# Patient Record
Sex: FEMALE | Race: WHITE | Employment: UNEMPLOYED | URBAN - METROPOLITAN AREA
[De-identification: names, ages, dates, MRNs, and addresses within clinical notes are randomized per-mention and may not be internally consistent; named-entity substitution may affect disease eponyms.]

---

## 2018-01-09 LAB — DIABETIC RETINOPATHY: NEGATIVE

## 2018-02-16 PROBLEM — F41.9 ANXIETY DISORDER: Status: ACTIVE | Noted: 2018-02-16

## 2018-02-16 PROBLEM — E78.5 HYPERLIPIDEMIA: Status: ACTIVE | Noted: 2018-02-16

## 2018-02-16 PROBLEM — E11.9 DM (DIABETES MELLITUS) (HCC): Status: ACTIVE | Noted: 2018-02-16

## 2018-02-16 PROBLEM — N17.9 ACUTE KIDNEY FAILURE (HCC): Status: RESOLVED | Noted: 2018-02-16 | Resolved: 2018-02-16

## 2018-02-16 PROBLEM — N18.4 CHRONIC KIDNEY DISEASE (CKD) STAGE G4/A1, SEVERELY DECREASED GLOMERULAR FILTRATION RATE (GFR) BETWEEN 15-29 ML/MIN/1.73 SQUARE METER AND ALBUMINURIA CREATININE RATIO LESS THAN 30 MG/G (HCC): Status: ACTIVE | Noted: 2018-02-16

## 2018-02-16 PROBLEM — N17.9 ACUTE KIDNEY FAILURE (HCC): Status: ACTIVE | Noted: 2018-02-16

## 2018-02-16 PROBLEM — E03.9 HYPOTHYROIDISM: Status: ACTIVE | Noted: 2018-02-16

## 2018-02-16 PROBLEM — I10 HTN (HYPERTENSION): Status: ACTIVE | Noted: 2018-02-16

## 2020-09-10 RX ORDER — PIOGLITAZONEHYDROCHLORIDE 30 MG/1
30 TABLET ORAL DAILY
Qty: 30 TABLET | Refills: 3 | Status: SHIPPED | OUTPATIENT
Start: 2020-09-10 | End: 2021-02-18

## 2020-09-10 RX ORDER — ATORVASTATIN CALCIUM 40 MG/1
40 TABLET, FILM COATED ORAL DAILY
Qty: 30 TABLET | Refills: 3 | Status: SHIPPED | OUTPATIENT
Start: 2020-09-10 | End: 2021-02-18

## 2020-09-10 RX ORDER — LEVOTHYROXINE SODIUM 88 UG/1
88 TABLET ORAL DAILY
Qty: 30 TABLET | Refills: 3 | Status: SHIPPED | OUTPATIENT
Start: 2020-09-10 | End: 2021-02-18

## 2020-09-28 ENCOUNTER — OFFICE VISIT (OUTPATIENT)
Dept: INTERNAL MEDICINE CLINIC | Age: 75
End: 2020-09-28
Payer: MEDICARE

## 2020-09-28 VITALS
HEART RATE: 56 BPM | WEIGHT: 194.8 LBS | BODY MASS INDEX: 30.06 KG/M2 | OXYGEN SATURATION: 96 % | DIASTOLIC BLOOD PRESSURE: 90 MMHG | SYSTOLIC BLOOD PRESSURE: 164 MMHG

## 2020-09-28 PROBLEM — F32.A CHRONIC DEPRESSION: Status: ACTIVE | Noted: 2020-09-28

## 2020-09-28 PROBLEM — E11.65 CONTROLLED TYPE 2 DIABETES MELLITUS WITH HYPERGLYCEMIA, WITHOUT LONG-TERM CURRENT USE OF INSULIN (HCC): Status: ACTIVE | Noted: 2020-09-28

## 2020-09-28 PROBLEM — G89.29 CHRONIC PAIN OF RIGHT KNEE: Status: ACTIVE | Noted: 2020-09-28

## 2020-09-28 PROBLEM — G25.81 RLS (RESTLESS LEGS SYNDROME): Status: ACTIVE | Noted: 2020-09-28

## 2020-09-28 PROBLEM — M25.561 CHRONIC PAIN OF RIGHT KNEE: Status: ACTIVE | Noted: 2020-09-28

## 2020-09-28 PROBLEM — Z72.0 TOBACCO ABUSE: Status: ACTIVE | Noted: 2020-09-28

## 2020-09-28 PROCEDURE — 1123F ACP DISCUSS/DSCN MKR DOCD: CPT | Performed by: INTERNAL MEDICINE

## 2020-09-28 PROCEDURE — 1090F PRES/ABSN URINE INCON ASSESS: CPT | Performed by: INTERNAL MEDICINE

## 2020-09-28 PROCEDURE — 90694 VACC AIIV4 NO PRSRV 0.5ML IM: CPT | Performed by: INTERNAL MEDICINE

## 2020-09-28 PROCEDURE — G8400 PT W/DXA NO RESULTS DOC: HCPCS | Performed by: INTERNAL MEDICINE

## 2020-09-28 PROCEDURE — 4040F PNEUMOC VAC/ADMIN/RCVD: CPT | Performed by: INTERNAL MEDICINE

## 2020-09-28 PROCEDURE — G0008 ADMIN INFLUENZA VIRUS VAC: HCPCS | Performed by: INTERNAL MEDICINE

## 2020-09-28 PROCEDURE — 3017F COLORECTAL CA SCREEN DOC REV: CPT | Performed by: INTERNAL MEDICINE

## 2020-09-28 PROCEDURE — 99213 OFFICE O/P EST LOW 20 MIN: CPT | Performed by: INTERNAL MEDICINE

## 2020-09-28 PROCEDURE — G8417 CALC BMI ABV UP PARAM F/U: HCPCS | Performed by: INTERNAL MEDICINE

## 2020-09-28 PROCEDURE — 4004F PT TOBACCO SCREEN RCVD TLK: CPT | Performed by: INTERNAL MEDICINE

## 2020-09-28 PROCEDURE — 3046F HEMOGLOBIN A1C LEVEL >9.0%: CPT | Performed by: INTERNAL MEDICINE

## 2020-09-28 PROCEDURE — 2022F DILAT RTA XM EVC RTNOPTHY: CPT | Performed by: INTERNAL MEDICINE

## 2020-09-28 PROCEDURE — G8427 DOCREV CUR MEDS BY ELIG CLIN: HCPCS | Performed by: INTERNAL MEDICINE

## 2020-09-28 RX ORDER — AMLODIPINE BESYLATE 10 MG/1
10 TABLET ORAL DAILY
Qty: 30 TABLET | Refills: 1 | Status: SHIPPED | OUTPATIENT
Start: 2020-09-28 | End: 2020-12-07 | Stop reason: SDUPTHER

## 2020-09-28 RX ORDER — CHLORTHALIDONE 25 MG/1
TABLET ORAL
Qty: 30 TABLET | Refills: 5 | Status: SHIPPED | OUTPATIENT
Start: 2020-09-28 | End: 2020-12-07 | Stop reason: SDUPTHER

## 2020-09-28 RX ORDER — CLONIDINE HYDROCHLORIDE 0.2 MG/1
0.2 TABLET ORAL 2 TIMES DAILY
Qty: 60 TABLET | Refills: 1 | Status: SHIPPED | OUTPATIENT
Start: 2020-09-28 | End: 2020-12-07 | Stop reason: SDUPTHER

## 2020-09-28 RX ORDER — BUSPIRONE HYDROCHLORIDE 15 MG/1
15 TABLET ORAL 3 TIMES DAILY
Qty: 90 TABLET | Refills: 0 | Status: ON HOLD | OUTPATIENT
Start: 2020-09-28 | End: 2022-06-10 | Stop reason: HOSPADM

## 2020-09-28 ASSESSMENT — PATIENT HEALTH QUESTIONNAIRE - PHQ9
2. FEELING DOWN, DEPRESSED OR HOPELESS: 0
1. LITTLE INTEREST OR PLEASURE IN DOING THINGS: 0
SUM OF ALL RESPONSES TO PHQ9 QUESTIONS 1 & 2: 0
SUM OF ALL RESPONSES TO PHQ QUESTIONS 1-9: 0
SUM OF ALL RESPONSES TO PHQ QUESTIONS 1-9: 0

## 2020-09-28 NOTE — PROGRESS NOTES
LUNGS:    Clear to auscultation bilaterally, respirations unlabored, accessory muscles are not used. HEART:     Regular rate and rhythm, S1 and S2 normal, no murmur, rub or gallop. PMI in MCL. ABDOMEN:    Soft, non-tender, bowel sounds are normoactive, no masses, no hepatospleenomegaly. EXTREMITY:   no bipedal edema  NEURO:  Alert, oriented to person, place and time. Grossly intact. Musculoskeletal:         Mild tenderness right knee. PSYCH:  Mood euthymic, insight and judgement good. ASSESSMENT/PLAN:      Essential hypertension. Continue current medications, denies side effect with medicationss. Low salt diet and exercise advised. -     cloNIDine (CATAPRES) 0.2 MG tablet; Take 1 tablet by mouth 2 times daily Take 2 tabs every day; 1 tab every day  -     chlorthalidone (HYGROTON) 25 MG tablet; TAKE ONE TABLET BY MOUTH DAILY  -     amLODIPine (NORVASC) 10 MG tablet; Take 1 tablet by mouth daily    Mixed hyperlipidemia. Patient is taking cholesterol medications regularly. Denies any side effects. Diet and exercise advised. On lipitor. Hypothyroidism, unspecified type. Continue synthroid. Anxiety disorder, unspecified type  -     busPIRone (BUSPAR) 15 MG tablet; Take 15 mg by mouth 3 times daily  Advise to follow with her psychiatrist.    Chronic kidney disease (CKD) stage G4/A1, severely decreased glomerular filtration rate (GFR) between 15-29 mL/min/1.73 square meter and albuminuria creatinine ratio less than 30 mg/g ([address] Avoid NSAID, keep hydrated, keep appointments with nephrologist.    Tobacco abuse. Advised strongly to quit smoking. Chronic depression. Sees psychiatrist.    RLS (restless legs syndrome)    Chronic pain of right knee    Controlled type 2 diabetes mellitus with hyperglycemia, without long-term current use of insulin ([address] Continue diabetic meds . Diet and exercise.     Need for immunization against influenza  -     INFLUENZA, QUADV, ADJUVANTED, 65 YRS =, IM, PF, PREFILL SYR, 0.5ML (FLUAD)            Care discussed with patient. Questions answered and patient verbalizes understanding and agrees with plan. Medications reviewed and reconciled. Continue current medications. Appropriate prescriptions are ordered. Risks and benefits of meds are discussed. After visit summary provided. Advised to call for any problems, questions, or concerns. If symptoms worsen or don't improve as expected, to call us or go to ER. Follow up as directed, sooner if needed. No follow-ups on file.         Osiris Hill MD

## 2020-12-07 DIAGNOSIS — I10 ESSENTIAL HYPERTENSION: ICD-10-CM

## 2020-12-07 RX ORDER — GLIMEPIRIDE 1 MG/1
4 TABLET ORAL 2 TIMES DAILY
Qty: 30 TABLET | Refills: 3 | Status: SHIPPED | OUTPATIENT
Start: 2020-12-07 | End: 2020-12-29 | Stop reason: SDUPTHER

## 2020-12-07 RX ORDER — AMLODIPINE BESYLATE 10 MG/1
10 TABLET ORAL DAILY
Qty: 30 TABLET | Refills: 3 | Status: SHIPPED | OUTPATIENT
Start: 2020-12-07 | End: 2021-03-24 | Stop reason: SDUPTHER

## 2020-12-07 RX ORDER — CHLORTHALIDONE 25 MG/1
TABLET ORAL
Qty: 30 TABLET | Refills: 3 | Status: SHIPPED | OUTPATIENT
Start: 2020-12-07 | End: 2021-03-24 | Stop reason: SDUPTHER

## 2020-12-07 RX ORDER — METOPROLOL TARTRATE 50 MG/1
50 TABLET, FILM COATED ORAL 2 TIMES DAILY
Qty: 60 TABLET | Refills: 3 | Status: SHIPPED | OUTPATIENT
Start: 2020-12-07 | End: 2021-03-24 | Stop reason: SDUPTHER

## 2020-12-07 RX ORDER — CLONIDINE HYDROCHLORIDE 0.2 MG/1
0.2 TABLET ORAL DAILY
Qty: 30 TABLET | Refills: 3 | Status: SHIPPED | OUTPATIENT
Start: 2020-12-07 | End: 2021-03-24 | Stop reason: SDUPTHER

## 2020-12-29 ENCOUNTER — OFFICE VISIT (OUTPATIENT)
Dept: INTERNAL MEDICINE CLINIC | Age: 75
End: 2020-12-29
Payer: MEDICARE

## 2020-12-29 VITALS
WEIGHT: 198 LBS | SYSTOLIC BLOOD PRESSURE: 140 MMHG | TEMPERATURE: 97.4 F | HEART RATE: 63 BPM | HEIGHT: 65 IN | DIASTOLIC BLOOD PRESSURE: 86 MMHG | BODY MASS INDEX: 32.99 KG/M2 | OXYGEN SATURATION: 95 %

## 2020-12-29 PROBLEM — M17.0 ARTHRITIS OF BOTH KNEES: Status: ACTIVE | Noted: 2020-12-29

## 2020-12-29 LAB
BASOPHILS ABSOLUTE: 0.1 K/UL (ref 0–0.2)
BASOPHILS RELATIVE PERCENT: 0.7 %
CHP ED QC CHECK: NORMAL
EOSINOPHILS ABSOLUTE: 0.1 K/UL (ref 0–0.6)
EOSINOPHILS RELATIVE PERCENT: 2 %
GLUCOSE BLD-MCNC: 162 MG/DL
HCT VFR BLD CALC: 50.3 % (ref 36–48)
HEMOGLOBIN: 16.9 G/DL (ref 12–16)
LYMPHOCYTES ABSOLUTE: 1.5 K/UL (ref 1–5.1)
LYMPHOCYTES RELATIVE PERCENT: 19.9 %
MCH RBC QN AUTO: 32.5 PG (ref 26–34)
MCHC RBC AUTO-ENTMCNC: 33.6 G/DL (ref 31–36)
MCV RBC AUTO: 96.9 FL (ref 80–100)
MONOCYTES ABSOLUTE: 0.5 K/UL (ref 0–1.3)
MONOCYTES RELATIVE PERCENT: 6.5 %
NEUTROPHILS ABSOLUTE: 5.3 K/UL (ref 1.7–7.7)
NEUTROPHILS RELATIVE PERCENT: 70.9 %
PDW BLD-RTO: 13.9 % (ref 12.4–15.4)
PLATELET # BLD: 205 K/UL (ref 135–450)
PMV BLD AUTO: 9.1 FL (ref 5–10.5)
RBC # BLD: 5.19 M/UL (ref 4–5.2)
TSH SERPL DL<=0.05 MIU/L-ACNC: 2.6 UIU/ML (ref 0.27–4.2)
WBC # BLD: 7.5 K/UL (ref 4–11)

## 2020-12-29 PROCEDURE — 3017F COLORECTAL CA SCREEN DOC REV: CPT | Performed by: INTERNAL MEDICINE

## 2020-12-29 PROCEDURE — G8484 FLU IMMUNIZE NO ADMIN: HCPCS | Performed by: INTERNAL MEDICINE

## 2020-12-29 PROCEDURE — 1090F PRES/ABSN URINE INCON ASSESS: CPT | Performed by: INTERNAL MEDICINE

## 2020-12-29 PROCEDURE — 99214 OFFICE O/P EST MOD 30 MIN: CPT | Performed by: INTERNAL MEDICINE

## 2020-12-29 PROCEDURE — 36415 COLL VENOUS BLD VENIPUNCTURE: CPT | Performed by: INTERNAL MEDICINE

## 2020-12-29 PROCEDURE — 1123F ACP DISCUSS/DSCN MKR DOCD: CPT | Performed by: INTERNAL MEDICINE

## 2020-12-29 PROCEDURE — G8400 PT W/DXA NO RESULTS DOC: HCPCS | Performed by: INTERNAL MEDICINE

## 2020-12-29 PROCEDURE — G8427 DOCREV CUR MEDS BY ELIG CLIN: HCPCS | Performed by: INTERNAL MEDICINE

## 2020-12-29 PROCEDURE — 3046F HEMOGLOBIN A1C LEVEL >9.0%: CPT | Performed by: INTERNAL MEDICINE

## 2020-12-29 PROCEDURE — 82962 GLUCOSE BLOOD TEST: CPT | Performed by: INTERNAL MEDICINE

## 2020-12-29 PROCEDURE — G8417 CALC BMI ABV UP PARAM F/U: HCPCS | Performed by: INTERNAL MEDICINE

## 2020-12-29 PROCEDURE — 4040F PNEUMOC VAC/ADMIN/RCVD: CPT | Performed by: INTERNAL MEDICINE

## 2020-12-29 PROCEDURE — 4004F PT TOBACCO SCREEN RCVD TLK: CPT | Performed by: INTERNAL MEDICINE

## 2020-12-29 PROCEDURE — 2022F DILAT RTA XM EVC RTNOPTHY: CPT | Performed by: INTERNAL MEDICINE

## 2020-12-29 RX ORDER — GLIMEPIRIDE 4 MG/1
4 TABLET ORAL 2 TIMES DAILY
Qty: 60 TABLET | Refills: 3 | Status: SHIPPED | OUTPATIENT
Start: 2020-12-29 | End: 2021-03-24 | Stop reason: SDUPTHER

## 2020-12-29 RX ORDER — LISINOPRIL 20 MG/1
20 TABLET ORAL 2 TIMES DAILY
Qty: 60 TABLET | Refills: 3 | Status: SHIPPED | OUTPATIENT
Start: 2020-12-29 | End: 2021-03-24 | Stop reason: SDUPTHER

## 2020-12-29 NOTE — PROGRESS NOTES
Name: Abbey Goodpasture  [id number]  Age: 76 y.o. YOB: 1945  Sex: female    CHIEF COMPLAINT:    Chief Complaint   Patient presents with    Hypertension    Diabetes       HISTORY OF PRESENT ILLNESS:     This is a pleasant  76 y.o. female  is seen today for management of chronic medical problems and medications refills. Previous records reviewed . Doing OK . Denies CP or SOB. No fever , sore throat or cough or congestion. Denies any abdominal pain. Appetite OK. Bowels moving [id number] No urinary symptoms. C/O pain legs/knees. Tylenol does not help her. Hearing is ok. Vision Ok with glasses. Denies  any significant skin lesions. Depression and anxiety better with medications. Being followed by psychiatrist, Dr. Guille Babin periodically. She does not check her sugars. No other complaints. Past Medical History:    Patient Active Problem List   Diagnosis    Chronic kidney disease (CKD) stage G4/A1, severely decreased glomerular filtration rate (GFR) between 15-29 mL/min/1.73 square meter and albuminuria creatinine ratio less than 30 mg/g (HCC)    DM [facility]    HTN (hypertension)    Hyperlipidemia    Hypothyroidism    Anxiety disorder    Tobacco abuse    Chronic depression    RLS (restless legs syndrome)    Controlled type 2 diabetes mellitus with hyperglycemia, without long-term current use of insulin (HCC)    Chronic pain of right knee    Arthritis of both knees        Past Surgical History:    No past surgical history on file.     Social History:   Social History     Tobacco Use    Smoking status: Current Every Day Smoker     Packs/day: 1.00     Years: 50.00     Pack years: 50.00     Types: Cigarettes    Smokeless tobacco: Never Used   Substance Use Topics    Alcohol use: No       Family History:       Problem Relation Age of Onset    Diabetes Father     High Blood Pressure Father     Kidney Disease Father     Cancer Brother  Stroke Maternal Grandmother     Cancer Paternal Grandmother        Allergies:  Seasonal    Current Medications :      Prior to Admission medications    Medication Sig Start Date End Date Taking? Authorizing Provider   glimepiride (AMARYL) 4 MG tablet Take 1 tablet by mouth 2 times daily Take 1.5 tabs 12/29/20  Yes Laura Block MD   lisinopril (PRINIVIL;ZESTRIL) 20 MG tablet Take 1 tablet by mouth 2 times daily 12/29/20  Yes Laura Block MD   chlorthalidone (HYGROTON) 25 MG tablet TAKE ONE TABLET BY MOUTH DAILY 12/7/20  Yes Laura Block MD   metoprolol tartrate (LOPRESSOR) 50 MG tablet Take 1 tablet by mouth 2 times daily Take 1.5tab bid 12/7/20  Yes Laura Block MD   cloNIDine (CATAPRES) 0.2 MG tablet Take 1 tablet by mouth daily 12/7/20  Yes Laura Block MD   amLODIPine (NORVASC) 10 MG tablet Take 1 tablet by mouth daily 12/7/20  Yes Laura Block MD   busPIRone (BUSPAR) 15 MG tablet Take 15 mg by mouth 3 times daily 9/28/20  Yes Laura Block MD   levothyroxine (SYNTHROID) 88 MCG tablet Take 1 tablet by mouth Daily 9/10/20  Yes Laura Block MD   atorvastatin (LIPITOR) 40 MG tablet Take 1 tablet by mouth daily 9/10/20  Yes Laura Block MD   pioglitazone (ACTOS) 30 MG tablet Take 1 tablet by mouth daily 9/10/20  Yes Laura Block MD   spironolactone (ALDACTONE) 25 MG tablet TAKE ONE TABLET BY MOUTH DAILY 1/18/19  Yes Alexandra Garcia MD   LORazepam (ATIVAN) 1 MG tablet Take 1 mg by mouth every 8 hours as needed for Anxiety. Yes Historical Provider, MD   sertraline (ZOLOFT) 25 MG tablet Take 100 mg by mouth daily   Yes Historical Provider, MD       LAB DATA: Reviewed. REVIEW OF SYSTEMS:   see HPI/ Comprehensive review of systems negative except for the ones mentioned in HPI.     PHYSICAL EXAMINATION:   BP (!) 140/86   Pulse 63   Temp 97.4 °F (36.3 °C)   Ht 5' 5\" (1.651 m)   Wt 198 lb (89.8 kg)   SpO2 95%   BMI 32.95 kg/m² GENERAL APPEARANCE:    Alert, oriented x 3, well developed, cooperative, not in any distress, appears stated age. HEAD:   Normocephalic, atraumatic   EYES:   PERRLA, EOMI, lids normal, conjuctivea clear, sclera anicteric. NECK:    Supple, symmetrical,  trachea midline, no thyromegaly, no JVD, no lymphadenopathy. LUNGS:    Clear to auscultation bilaterally, respirations unlabored, accessory muscles are not used. HEART:     Regular rate and rhythm, S1 and S2 normal, no murmur, rub or gallop. PMI in MCL. ABDOMEN:    Soft, non-tender, bowel sounds are normoactive, no masses, no hepatospleenomegaly. EXTREMITY:   no bipedal edema  NEURO:  Alert, oriented to person, place and time. Grossly intact. Musculoskeletal:         No kyphosis or scoliosis, significant tenderness in both of her knees. Skin:                            Warm and dry. No rash or obvious suspicious lesions. PSYCH:  Mood euthymic, insight and judgement good. ASSESSMENT/PLAN:    Type 2 diabetes mellitus with stage 4 chronic kidney disease, without long-term current use of insulin ([address] Doing OK . Denies CP or SOB. No fever , sore throat or cough or congestion. Denies any abdominal pain. Appetite OK. Bowels moving [place]. No urinary symptoms. Denies any significant arthritis. Hearing is ok. Vision Ok with glasses. Denies  any significant skin lesions. Denies any significant depression or anxiety. No other complaints. Refuses to check her sugars at home. -     POCT Glucose  -     glimepiride (AMARYL) 4 MG tablet; Take 1 tablet by mouth 2 times daily Take 1.5 tabs  She is also on Actos. -     Hemoglobin A1C. Advised extensively to check sugars at home but patient refused. Chronic kidney disease (CKD) stage G4/A1, severely decreased glomerular filtration rate (GFR) between 15-29 mL/min/1.73 square meter and albuminuria creatinine ratio less than 30 mg/g ([address] She has not seen her nephrologist for over a year. Advised to follow with her nephrologist.  Avoid NSAID and keep well-hydrated. Essential hypertension. Continue current medications, denies side effect with medicationss. Low salt diet and exercise advised. Also on Norvasc, Catapres and Lopressor and chlorthalidone and Aldactone. -     lisinopril (PRINIVIL;ZESTRIL) 20 MG tablet; Take 1 tablet by mouth 2 times daily  -     CBC Auto Differential  -     Comprehensive Metabolic Panel    Mixed hyperlipidemia. Patient is taking cholesterol medications regularly. Denies any side effects. Diet and exercise advised. Continue Lipitor  -     Lipid, Fasting    Tobacco abuse. Extensively advised her to quit smoking. Patient not ready to quit smoking. She will try to slow down his smoking. RLS (restless legs syndrome). Tylenol as needed for now. Anxiety disorder, unspecified type. Advised to continue to follow with her psychiatrist and take Ativan as prescribed    Chronic depression. Advised to continue to follow with her psychiatrist and take Zoloft as prescribed    Hypothyroidism, unspecified type. Continue Synthroid  -     TSH without Reflex    Arthritis of both knees. Tylenol as needed. Refer to orthopedic doctor. -     Vic Barrera DO, Orthopedic Surgery, [place]      I have recommended that the patient follow CDC guidelines for prevention of COVID-19 infection. I also discussed Coronavirus precaution including wearing face mask, handwashing practice, wiping items touched in public such as gas pumps, door handles, shopping carts, etc. Also Self monitoring for infection - fever, chills, cough, SOB. Should he/she develop symptoms he/she should call office or go to ER for further instructions. Care discussed with patient. Questions answered and patient verbalizes understanding and agrees with plan. Medications reviewed and reconciled. Continue current medications. Appropriate prescriptions are ordered. Risks and benefits of meds are discussed. After visit summary provided. Advised to call for any problems, questions, or concerns. If symptoms worsen or don't improve as expected, to call us or go to ER. Follow up as directed, sooner if needed. Return in about 3 months (around 3/29/2021). This dictation was performed with a verbal recognition program and it was checked for errors. It is possible that there are still dictated errors within this office note. Any errors should be brought immediately to my attention for correction. All efforts were made to ensure that this office note is accurate.      Dex Long MD

## 2020-12-30 LAB
A/G RATIO: 1.6 (ref 1.1–2.2)
ALBUMIN SERPL-MCNC: 4.1 G/DL (ref 3.4–5)
ALP BLD-CCNC: 98 U/L (ref 40–129)
ALT SERPL-CCNC: 12 U/L (ref 10–40)
ANION GAP SERPL CALCULATED.3IONS-SCNC: 17 MMOL/L (ref 3–16)
AST SERPL-CCNC: 14 U/L (ref 15–37)
BILIRUB SERPL-MCNC: 0.3 MG/DL (ref 0–1)
BUN BLDV-MCNC: 32 MG/DL (ref 7–20)
CALCIUM SERPL-MCNC: 9.5 MG/DL (ref 8.3–10.6)
CHLORIDE BLD-SCNC: 99 MMOL/L (ref 99–110)
CHOLESTEROL, FASTING: 141 MG/DL (ref 0–199)
CO2: 27 MMOL/L (ref 21–32)
CREAT SERPL-MCNC: 1.5 MG/DL (ref 0.6–1.2)
ESTIMATED AVERAGE GLUCOSE: 168.6 MG/DL
GFR AFRICAN AMERICAN: 41
GFR NON-AFRICAN AMERICAN: 34
GLOBULIN: 2.5 G/DL
GLUCOSE BLD-MCNC: 158 MG/DL (ref 70–99)
HBA1C MFR BLD: 7.5 %
HDLC SERPL-MCNC: 42 MG/DL (ref 40–60)
LDL CHOLESTEROL CALCULATED: 75 MG/DL
POTASSIUM SERPL-SCNC: 3.9 MMOL/L (ref 3.5–5.1)
SODIUM BLD-SCNC: 143 MMOL/L (ref 136–145)
TOTAL PROTEIN: 6.6 G/DL (ref 6.4–8.2)
TRIGLYCERIDE, FASTING: 118 MG/DL (ref 0–150)
VLDLC SERPL CALC-MCNC: 24 MG/DL

## 2021-02-18 RX ORDER — LEVOTHYROXINE SODIUM 88 UG/1
TABLET ORAL
Qty: 30 TABLET | Refills: 2 | Status: SHIPPED | OUTPATIENT
Start: 2021-02-18 | End: 2021-03-24 | Stop reason: SDUPTHER

## 2021-02-18 RX ORDER — ATORVASTATIN CALCIUM 40 MG/1
TABLET, FILM COATED ORAL
Qty: 30 TABLET | Refills: 2 | Status: SHIPPED | OUTPATIENT
Start: 2021-02-18 | End: 2021-03-24 | Stop reason: SDUPTHER

## 2021-02-18 RX ORDER — PIOGLITAZONEHYDROCHLORIDE 30 MG/1
TABLET ORAL
Qty: 30 TABLET | Refills: 2 | Status: SHIPPED | OUTPATIENT
Start: 2021-02-18 | End: 2021-03-24 | Stop reason: SDUPTHER

## 2021-03-24 ENCOUNTER — OFFICE VISIT (OUTPATIENT)
Dept: INTERNAL MEDICINE CLINIC | Age: 76
End: 2021-03-24
Payer: MEDICARE

## 2021-03-24 VITALS
OXYGEN SATURATION: 97 % | BODY MASS INDEX: 32.49 KG/M2 | WEIGHT: 195 LBS | DIASTOLIC BLOOD PRESSURE: 88 MMHG | HEART RATE: 66 BPM | SYSTOLIC BLOOD PRESSURE: 130 MMHG | HEIGHT: 65 IN | TEMPERATURE: 97.1 F

## 2021-03-24 DIAGNOSIS — G25.81 RLS (RESTLESS LEGS SYNDROME): ICD-10-CM

## 2021-03-24 DIAGNOSIS — N18.32 STAGE 3B CHRONIC KIDNEY DISEASE (HCC): ICD-10-CM

## 2021-03-24 DIAGNOSIS — Z11.59 NEED FOR HEPATITIS C SCREENING TEST: ICD-10-CM

## 2021-03-24 DIAGNOSIS — F32.A CHRONIC DEPRESSION: ICD-10-CM

## 2021-03-24 DIAGNOSIS — E03.4 HYPOTHYROIDISM DUE TO ACQUIRED ATROPHY OF THYROID: ICD-10-CM

## 2021-03-24 DIAGNOSIS — E78.2 MIXED HYPERLIPIDEMIA: ICD-10-CM

## 2021-03-24 DIAGNOSIS — F41.9 ANXIETY: ICD-10-CM

## 2021-03-24 DIAGNOSIS — E11.22 TYPE 2 DIABETES MELLITUS WITH STAGE 4 CHRONIC KIDNEY DISEASE, WITHOUT LONG-TERM CURRENT USE OF INSULIN (HCC): Primary | ICD-10-CM

## 2021-03-24 DIAGNOSIS — I10 ESSENTIAL HYPERTENSION: ICD-10-CM

## 2021-03-24 DIAGNOSIS — M17.0 ARTHRITIS OF BOTH KNEES: ICD-10-CM

## 2021-03-24 DIAGNOSIS — N18.4 TYPE 2 DIABETES MELLITUS WITH STAGE 4 CHRONIC KIDNEY DISEASE, WITHOUT LONG-TERM CURRENT USE OF INSULIN (HCC): Primary | ICD-10-CM

## 2021-03-24 DIAGNOSIS — Z72.0 TOBACCO ABUSE: ICD-10-CM

## 2021-03-24 LAB
A/G RATIO: 1.5 (ref 1.1–2.2)
ALBUMIN SERPL-MCNC: 3.7 G/DL (ref 3.4–5)
ALP BLD-CCNC: 88 U/L (ref 40–129)
ALT SERPL-CCNC: 9 U/L (ref 10–40)
ANION GAP SERPL CALCULATED.3IONS-SCNC: 12 MMOL/L (ref 3–16)
AST SERPL-CCNC: 13 U/L (ref 15–37)
BASOPHILS ABSOLUTE: 0 K/UL (ref 0–0.2)
BASOPHILS RELATIVE PERCENT: 0.8 %
BILIRUB SERPL-MCNC: 0.4 MG/DL (ref 0–1)
BUN BLDV-MCNC: 32 MG/DL (ref 7–20)
CALCIUM SERPL-MCNC: 9.9 MG/DL (ref 8.3–10.6)
CHLORIDE BLD-SCNC: 100 MMOL/L (ref 99–110)
CHP ED QC CHECK: NORMAL
CO2: 31 MMOL/L (ref 21–32)
CREAT SERPL-MCNC: 1.8 MG/DL (ref 0.6–1.2)
EOSINOPHILS ABSOLUTE: 0.1 K/UL (ref 0–0.6)
EOSINOPHILS RELATIVE PERCENT: 1.9 %
GFR AFRICAN AMERICAN: 33
GFR NON-AFRICAN AMERICAN: 27
GLOBULIN: 2.4 G/DL
GLUCOSE BLD-MCNC: 126 MG/DL
GLUCOSE BLD-MCNC: 153 MG/DL (ref 70–99)
HCT VFR BLD CALC: 48.8 % (ref 36–48)
HEMOGLOBIN: 16.6 G/DL (ref 12–16)
LYMPHOCYTES ABSOLUTE: 1.1 K/UL (ref 1–5.1)
LYMPHOCYTES RELATIVE PERCENT: 20.2 %
MCH RBC QN AUTO: 32.2 PG (ref 26–34)
MCHC RBC AUTO-ENTMCNC: 34.1 G/DL (ref 31–36)
MCV RBC AUTO: 94.5 FL (ref 80–100)
MONOCYTES ABSOLUTE: 0.3 K/UL (ref 0–1.3)
MONOCYTES RELATIVE PERCENT: 5.9 %
NEUTROPHILS ABSOLUTE: 3.8 K/UL (ref 1.7–7.7)
NEUTROPHILS RELATIVE PERCENT: 71.2 %
PDW BLD-RTO: 15.2 % (ref 12.4–15.4)
PLATELET # BLD: 149 K/UL (ref 135–450)
PMV BLD AUTO: 9.4 FL (ref 5–10.5)
POTASSIUM SERPL-SCNC: 3.4 MMOL/L (ref 3.5–5.1)
RBC # BLD: 5.17 M/UL (ref 4–5.2)
SODIUM BLD-SCNC: 143 MMOL/L (ref 136–145)
TOTAL PROTEIN: 6.1 G/DL (ref 6.4–8.2)
WBC # BLD: 5.4 K/UL (ref 4–11)

## 2021-03-24 PROCEDURE — 1090F PRES/ABSN URINE INCON ASSESS: CPT | Performed by: INTERNAL MEDICINE

## 2021-03-24 PROCEDURE — 3046F HEMOGLOBIN A1C LEVEL >9.0%: CPT | Performed by: INTERNAL MEDICINE

## 2021-03-24 PROCEDURE — G8400 PT W/DXA NO RESULTS DOC: HCPCS | Performed by: INTERNAL MEDICINE

## 2021-03-24 PROCEDURE — G8427 DOCREV CUR MEDS BY ELIG CLIN: HCPCS | Performed by: INTERNAL MEDICINE

## 2021-03-24 PROCEDURE — 4040F PNEUMOC VAC/ADMIN/RCVD: CPT | Performed by: INTERNAL MEDICINE

## 2021-03-24 PROCEDURE — 3017F COLORECTAL CA SCREEN DOC REV: CPT | Performed by: INTERNAL MEDICINE

## 2021-03-24 PROCEDURE — G8417 CALC BMI ABV UP PARAM F/U: HCPCS | Performed by: INTERNAL MEDICINE

## 2021-03-24 PROCEDURE — 4004F PT TOBACCO SCREEN RCVD TLK: CPT | Performed by: INTERNAL MEDICINE

## 2021-03-24 PROCEDURE — 36415 COLL VENOUS BLD VENIPUNCTURE: CPT | Performed by: INTERNAL MEDICINE

## 2021-03-24 PROCEDURE — 99214 OFFICE O/P EST MOD 30 MIN: CPT | Performed by: INTERNAL MEDICINE

## 2021-03-24 PROCEDURE — 2022F DILAT RTA XM EVC RTNOPTHY: CPT | Performed by: INTERNAL MEDICINE

## 2021-03-24 PROCEDURE — G8484 FLU IMMUNIZE NO ADMIN: HCPCS | Performed by: INTERNAL MEDICINE

## 2021-03-24 PROCEDURE — 1123F ACP DISCUSS/DSCN MKR DOCD: CPT | Performed by: INTERNAL MEDICINE

## 2021-03-24 PROCEDURE — 82962 GLUCOSE BLOOD TEST: CPT | Performed by: INTERNAL MEDICINE

## 2021-03-24 RX ORDER — GLIMEPIRIDE 4 MG/1
4 TABLET ORAL 2 TIMES DAILY
Qty: 60 TABLET | Refills: 3 | Status: SHIPPED | OUTPATIENT
Start: 2021-03-24 | End: 2021-06-24 | Stop reason: SDUPTHER

## 2021-03-24 RX ORDER — PIOGLITAZONEHYDROCHLORIDE 30 MG/1
30 TABLET ORAL DAILY
Qty: 30 TABLET | Refills: 3 | Status: SHIPPED | OUTPATIENT
Start: 2021-03-24 | End: 2021-06-24 | Stop reason: SDUPTHER

## 2021-03-24 RX ORDER — CHLORTHALIDONE 25 MG/1
TABLET ORAL
Qty: 30 TABLET | Refills: 3 | Status: SHIPPED | OUTPATIENT
Start: 2021-03-24 | End: 2021-06-24 | Stop reason: SDUPTHER

## 2021-03-24 RX ORDER — CLONIDINE HYDROCHLORIDE 0.2 MG/1
0.2 TABLET ORAL DAILY
Qty: 30 TABLET | Refills: 3 | Status: SHIPPED | OUTPATIENT
Start: 2021-03-24 | End: 2021-06-24 | Stop reason: SDUPTHER

## 2021-03-24 RX ORDER — AMLODIPINE BESYLATE 10 MG/1
10 TABLET ORAL DAILY
Qty: 30 TABLET | Refills: 3 | Status: SHIPPED | OUTPATIENT
Start: 2021-03-24 | End: 2021-06-24 | Stop reason: SDUPTHER

## 2021-03-24 RX ORDER — LEVOTHYROXINE SODIUM 88 UG/1
88 TABLET ORAL DAILY
Qty: 30 TABLET | Refills: 3 | Status: SHIPPED | OUTPATIENT
Start: 2021-03-24 | End: 2021-06-24 | Stop reason: SDUPTHER

## 2021-03-24 RX ORDER — METOPROLOL TARTRATE 50 MG/1
50 TABLET, FILM COATED ORAL 2 TIMES DAILY
Qty: 60 TABLET | Refills: 3 | Status: SHIPPED | OUTPATIENT
Start: 2021-03-24 | End: 2021-06-24 | Stop reason: SDUPTHER

## 2021-03-24 RX ORDER — ATORVASTATIN CALCIUM 40 MG/1
40 TABLET, FILM COATED ORAL DAILY
Qty: 30 TABLET | Refills: 3 | Status: SHIPPED | OUTPATIENT
Start: 2021-03-24 | End: 2021-06-24 | Stop reason: SDUPTHER

## 2021-03-24 RX ORDER — LISINOPRIL 20 MG/1
20 TABLET ORAL 2 TIMES DAILY
Qty: 60 TABLET | Refills: 3 | Status: SHIPPED | OUTPATIENT
Start: 2021-03-24 | End: 2021-06-24 | Stop reason: SDUPTHER

## 2021-03-24 NOTE — PROGRESS NOTES
Name: Lalita Alejandre  [id number]  Age: 76 y.o. YOB: 1945  Sex: female    CHIEF COMPLAINT:    Chief Complaint   Patient presents with    Diabetes    Hypertension    Other     other chronic conditions       HISTORY OF PRESENT ILLNESS:     This is a pleasant  76 y.o. female  is seen today for management of chronic medical problems and medications refills. Previous records reviewed . Doing OK . Denies CP or SOB. No fever , sore throat or cough or congestion. Still smokes @ 1 1/2 PPD. Not willing to quit at this time. Denies any abdominal pain. Appetite OK. Bowels moving [place]. No urinary symptoms. Still with pain in her knees but better. Did not go to see Dr. Bebeto Crocker as she was worried about contacting Covid etc.  Hearing is ok. Vision Ok with glasses. Denies  any significant skin lesions. Depression and anxiety better with medications. Seeing Dr. Costa periodically. Does not check her sugars at home. No other complaints. Did not get a Covid vaccine yet. Will get it soon. Past Medical History:    Patient Active Problem List   Diagnosis    Stage 4 [facility]    DM (diabetes mellitus) ([facility]    HTN (hypertension)    Hyperlipidemia    Hypothyroidism    Anxiety    Tobacco abuse    Chronic depression    RLS (restless legs syndrome)    Controlled type 2 diabetes mellitus with hyperglycemia, without long-term current use of insulin (HCC)    Chronic pain of right knee    Arthritis of both knees        Past Surgical History:    History reviewed. No pertinent surgical history.     Social History:   Social History     Tobacco Use    Smoking status: Current Every Day Smoker     Packs/day: 1.00     Years: 50.00     Pack years: 50.00     Types: Cigarettes    Smokeless tobacco: Never Used   Substance Use Topics    Alcohol use: No       Family History:       Problem Relation Age of Onset    Diabetes Father     High Blood Pressure Father     Kidney Disease Father  Cancer Brother     Stroke Maternal Grandmother     Cancer Paternal Grandmother        Allergies:  Seasonal    Current Medications :      Prior to Admission medications    Medication Sig Start Date End Date Taking? Authorizing Provider   amLODIPine (NORVASC) 10 MG tablet Take 1 tablet by mouth daily 3/24/21  Yes German Rucker MD   atorvastatin (LIPITOR) 40 MG tablet Take 1 tablet by mouth daily 3/24/21  Yes German Rucker MD   chlorthalidone (HYGROTON) 25 MG tablet TAKE ONE TABLET BY MOUTH DAILY 3/24/21  Yes German Rucker MD   cloNIDine (CATAPRES) 0.2 MG tablet Take 1 tablet by mouth daily 3/24/21  Yes German Rucker MD   glimepiride (AMARYL) 4 MG tablet Take 1 tablet by mouth 2 times daily Take 1.5 tabs 3/24/21  Yes German Rucker MD   levothyroxine (SYNTHROID) 88 MCG tablet Take 1 tablet by mouth Daily 3/24/21  Yes German Rucker MD   lisinopril (PRINIVIL;ZESTRIL) 20 MG tablet Take 1 tablet by mouth 2 times daily 3/24/21  Yes German Rucker MD   metoprolol tartrate (LOPRESSOR) 50 MG tablet Take 1 tablet by mouth 2 times daily Take 1.5tab bid 3/24/21  Yes German Rucker MD   pioglitazone (ACTOS) 30 MG tablet Take 1 tablet by mouth daily 3/24/21  Yes German Rucker MD   busPIRone (BUSPAR) 15 MG tablet Take 15 mg by mouth 3 times daily 9/28/20  Yes German Rucker MD   LORazepam (ATIVAN) 1 MG tablet Take 1 mg by mouth every 8 hours as needed for Anxiety. Yes Historical Provider, MD   sertraline (ZOLOFT) 25 MG tablet Take 100 mg by mouth daily   Yes Historical Provider, MD       LAB DATA: Reviewed. REVIEW OF SYSTEMS:   see HPI/ Comprehensive review of systems negative except for the ones mentioned in HPI.     PHYSICAL EXAMINATION:   /88 (Site: Left Upper Arm, Position: Sitting, Cuff Size: Medium Adult)   Pulse 66   Temp 97.1 °F (36.2 °C)   Ht 5' 5\" (1.651 m)   Wt 195 lb (88.5 kg)   SpO2 97%   BMI 32.45 kg/m²      GENERAL APPEARANCE:    Alert, oriented x 3, well developed, cooperative, not in any distress, appears stated age. HEAD:   Normocephalic, atraumatic   EYES:   PERRLA, EOMI, lids normal, conjuctivea clear, sclera anicteric. NECK:    Supple, symmetrical,  trachea midline, no thyromegaly, no JVD, no lymphadenopathy. LUNGS:    Clear to auscultation bilaterally, respirations unlabored, accessory muscles are not used. HEART:     Regular rate and rhythm, S1 and S2 normal, no murmur, rub or gallop. PMI in MCL. ABDOMEN:    Soft, non-tender, bowel sounds are normoactive, no masses, no hepatospleenomegaly. EXTREMITY:   no bipedal edema, mild tenderness in her knees. NEURO:  Alert, oriented to person, place and time. Grossly intact. Musculoskeletal:         No kyphosis or scoliosis, no deformity in any extremity noted, muscle strength and tone are normal.  Skin:                            Warm and dry. No rash or obvious suspicious lesions. PSYCH:  Mood euthymic, insight and judgement good. ASSESSMENT/PLAN:    1. Type 2 diabetes mellitus with stage 4 chronic kidney disease, without long-term current use of insulin ([facility])  Advised her to check her sugars frequently. Advised diet and exercise and weight loss. Continue Amaryl and Actos  - POCT Glucose  - glimepiride (AMARYL) 4 MG tablet; Take 1 tablet by mouth 2 times daily Take 1.5 tabs  Dispense: 60 tablet; Refill: 3  - pioglitazone (ACTOS) 30 MG tablet; Take 1 tablet by mouth daily  Dispense: 30 tablet; Refill: 3  - Hemoglobin A1C    2. Essential hypertension  Continue current medications, denies side effect with medicationss. Low salt diet and exercise advised. - amLODIPine (NORVASC) 10 MG tablet; Take 1 tablet by mouth daily  Dispense: 30 tablet; Refill: 3  - chlorthalidone (HYGROTON) 25 MG tablet; TAKE ONE TABLET BY MOUTH DAILY  Dispense: 30 tablet; Refill: 3  - cloNIDine (CATAPRES) 0.2 MG tablet; Take 1 tablet by mouth daily  Dispense: 30 tablet; Refill: 3  - lisinopril (PRINIVIL;ZESTRIL) 20 MG tablet;  Take 1 tablet by mouth 2 times daily  Dispense: 60 tablet; Refill: 3  - metoprolol tartrate (LOPRESSOR) 50 MG tablet; Take 1 tablet by mouth 2 times daily Take 1.5tab bid  Dispense: 60 tablet; Refill: 3  - Comprehensive Metabolic Panel  - CBC Auto Differential  Advised to continue to follow with her nephrologist.    3. Mixed hyperlipidemia  Patient is taking cholesterol medications regularly. Denies any side effects. Diet and exercise advised. Continue Lipitor. 4. Tobacco abuse  Advised strongly to quit smoking. 5. RLS (restless legs syndrome)  Tylenol as needed for now    6. Hypothyroidism due to acquired atrophy of thyroid  Continue Synthroid    7. Arthritis of both knees  Tylenol as needed and advised to follow with Dr. Glen F[place]on. 8. Chronic depression  Continue to follow with Dr. Chester Newton and take medications regularly. 9. Anxiety  Continue to follow with Dr. Florez Salts take medications prescribed by him regularly    10. Stage 3b chronic kidney disease  Advised to follow with her nephrologist soon. Avoid NSAID and keep well-hydrated. 11. Need for hepatitis C screening test  - Hepatitis C Antibody; Future    I have recommended that the patient follow CDC guidelines for prevention of COVID-19 infection. I also discussed Coronavirus precaution including wearing face mask, handwashing practice, wiping items touched in public such as gas pumps, door handles, shopping carts, etc. Also Self monitoring for infection - fever, chills, cough, SOB. Should he/she develop symptoms he/she should call office or go to ER for further instructions. Care discussed with patient. Questions answered and patient verbalizes understanding and agrees with plan. Medications reviewed and reconciled. Continue current medications. Appropriate prescriptions are ordered. Risks and benefits of meds are discussed. After visit summary provided. Advised to call for any problems, questions, or concerns.    If symptoms worsen or don't improve as expected, to call us or go to ER. Follow up as directed, sooner if needed. Return in about 3 months (around 6/24/2021). This dictation was performed with a verbal recognition program and it was checked for errors. It is possible that there are still dictated errors within this office note. Any errors should be brought immediately to my attention for correction. All efforts were made to ensure that this office note is accurate.      Janie Wong MD

## 2021-03-25 LAB
ESTIMATED AVERAGE GLUCOSE: 174.3 MG/DL
HBA1C MFR BLD: 7.7 %

## 2021-03-25 RX ORDER — POTASSIUM CHLORIDE 750 MG/1
10 TABLET, EXTENDED RELEASE ORAL DAILY
Qty: 3 TABLET | Refills: 1 | Status: SHIPPED | OUTPATIENT
Start: 2021-03-25 | End: 2021-06-24 | Stop reason: SDUPTHER

## 2021-06-24 ENCOUNTER — OFFICE VISIT (OUTPATIENT)
Dept: INTERNAL MEDICINE CLINIC | Age: 76
End: 2021-06-24
Payer: MEDICARE

## 2021-06-24 VITALS
SYSTOLIC BLOOD PRESSURE: 138 MMHG | HEART RATE: 87 BPM | WEIGHT: 199 LBS | OXYGEN SATURATION: 95 % | BODY MASS INDEX: 33.15 KG/M2 | HEIGHT: 65 IN | DIASTOLIC BLOOD PRESSURE: 84 MMHG

## 2021-06-24 DIAGNOSIS — F41.9 ANXIETY DISORDER, UNSPECIFIED TYPE: ICD-10-CM

## 2021-06-24 DIAGNOSIS — E03.4 HYPOTHYROIDISM DUE TO ACQUIRED ATROPHY OF THYROID: ICD-10-CM

## 2021-06-24 DIAGNOSIS — E78.2 MIXED HYPERLIPIDEMIA: ICD-10-CM

## 2021-06-24 DIAGNOSIS — F32.A CHRONIC DEPRESSION: ICD-10-CM

## 2021-06-24 DIAGNOSIS — G25.81 RLS (RESTLESS LEGS SYNDROME): ICD-10-CM

## 2021-06-24 DIAGNOSIS — N18.4 TYPE 2 DIABETES MELLITUS WITH STAGE 4 CHRONIC KIDNEY DISEASE, WITHOUT LONG-TERM CURRENT USE OF INSULIN (HCC): Primary | ICD-10-CM

## 2021-06-24 DIAGNOSIS — M17.0 ARTHRITIS OF BOTH KNEES: ICD-10-CM

## 2021-06-24 DIAGNOSIS — I10 ESSENTIAL HYPERTENSION: ICD-10-CM

## 2021-06-24 DIAGNOSIS — N18.4 STAGE 4 CHRONIC KIDNEY DISEASE (HCC): ICD-10-CM

## 2021-06-24 DIAGNOSIS — E11.22 TYPE 2 DIABETES MELLITUS WITH STAGE 4 CHRONIC KIDNEY DISEASE, WITHOUT LONG-TERM CURRENT USE OF INSULIN (HCC): Primary | ICD-10-CM

## 2021-06-24 DIAGNOSIS — Z72.0 TOBACCO ABUSE: ICD-10-CM

## 2021-06-24 PROBLEM — D68.9 COAGULATION DEFECT (HCC): Status: ACTIVE | Noted: 2021-06-24

## 2021-06-24 LAB
CHP ED QC CHECK: NORMAL
GLUCOSE BLD-MCNC: 125 MG/DL

## 2021-06-24 PROCEDURE — 1123F ACP DISCUSS/DSCN MKR DOCD: CPT | Performed by: INTERNAL MEDICINE

## 2021-06-24 PROCEDURE — 3051F HG A1C>EQUAL 7.0%<8.0%: CPT | Performed by: INTERNAL MEDICINE

## 2021-06-24 PROCEDURE — 2022F DILAT RTA XM EVC RTNOPTHY: CPT | Performed by: INTERNAL MEDICINE

## 2021-06-24 PROCEDURE — 82962 GLUCOSE BLOOD TEST: CPT | Performed by: INTERNAL MEDICINE

## 2021-06-24 PROCEDURE — G8400 PT W/DXA NO RESULTS DOC: HCPCS | Performed by: INTERNAL MEDICINE

## 2021-06-24 PROCEDURE — 4040F PNEUMOC VAC/ADMIN/RCVD: CPT | Performed by: INTERNAL MEDICINE

## 2021-06-24 PROCEDURE — 4004F PT TOBACCO SCREEN RCVD TLK: CPT | Performed by: INTERNAL MEDICINE

## 2021-06-24 PROCEDURE — G8427 DOCREV CUR MEDS BY ELIG CLIN: HCPCS | Performed by: INTERNAL MEDICINE

## 2021-06-24 PROCEDURE — 1090F PRES/ABSN URINE INCON ASSESS: CPT | Performed by: INTERNAL MEDICINE

## 2021-06-24 PROCEDURE — 3017F COLORECTAL CA SCREEN DOC REV: CPT | Performed by: INTERNAL MEDICINE

## 2021-06-24 PROCEDURE — 99214 OFFICE O/P EST MOD 30 MIN: CPT | Performed by: INTERNAL MEDICINE

## 2021-06-24 PROCEDURE — G8417 CALC BMI ABV UP PARAM F/U: HCPCS | Performed by: INTERNAL MEDICINE

## 2021-06-24 RX ORDER — GLIMEPIRIDE 4 MG/1
4 TABLET ORAL 2 TIMES DAILY
Qty: 60 TABLET | Refills: 3 | Status: SHIPPED | OUTPATIENT
Start: 2021-06-24 | End: 2021-09-27 | Stop reason: SDUPTHER

## 2021-06-24 RX ORDER — POTASSIUM CHLORIDE 750 MG/1
10 TABLET, EXTENDED RELEASE ORAL DAILY
Qty: 3 TABLET | Refills: 1 | Status: SHIPPED | OUTPATIENT
Start: 2021-06-24 | End: 2021-11-01

## 2021-06-24 RX ORDER — ATORVASTATIN CALCIUM 40 MG/1
40 TABLET, FILM COATED ORAL DAILY
Qty: 30 TABLET | Refills: 3 | Status: SHIPPED | OUTPATIENT
Start: 2021-06-24 | End: 2021-09-27 | Stop reason: SDUPTHER

## 2021-06-24 RX ORDER — LEVOTHYROXINE SODIUM 88 UG/1
88 TABLET ORAL DAILY
Qty: 30 TABLET | Refills: 3 | Status: SHIPPED | OUTPATIENT
Start: 2021-06-24 | End: 2021-09-27 | Stop reason: SDUPTHER

## 2021-06-24 RX ORDER — METOPROLOL TARTRATE 50 MG/1
50 TABLET, FILM COATED ORAL 2 TIMES DAILY
Qty: 60 TABLET | Refills: 3 | Status: SHIPPED | OUTPATIENT
Start: 2021-06-24 | End: 2021-09-27 | Stop reason: SDUPTHER

## 2021-06-24 RX ORDER — CHLORTHALIDONE 25 MG/1
TABLET ORAL
Qty: 30 TABLET | Refills: 3 | Status: SHIPPED | OUTPATIENT
Start: 2021-06-24 | End: 2021-09-27 | Stop reason: SDUPTHER

## 2021-06-24 RX ORDER — CLONIDINE HYDROCHLORIDE 0.2 MG/1
0.2 TABLET ORAL DAILY
Qty: 30 TABLET | Refills: 3 | Status: SHIPPED | OUTPATIENT
Start: 2021-06-24 | End: 2021-09-27 | Stop reason: SDUPTHER

## 2021-06-24 RX ORDER — AMLODIPINE BESYLATE 10 MG/1
10 TABLET ORAL DAILY
Qty: 30 TABLET | Refills: 3 | Status: SHIPPED | OUTPATIENT
Start: 2021-06-24 | End: 2021-09-27 | Stop reason: SDUPTHER

## 2021-06-24 RX ORDER — PIOGLITAZONEHYDROCHLORIDE 30 MG/1
30 TABLET ORAL DAILY
Qty: 30 TABLET | Refills: 3 | Status: SHIPPED | OUTPATIENT
Start: 2021-06-24 | End: 2021-09-27 | Stop reason: SDUPTHER

## 2021-06-24 RX ORDER — LISINOPRIL 20 MG/1
20 TABLET ORAL 2 TIMES DAILY
Qty: 60 TABLET | Refills: 3 | Status: SHIPPED | OUTPATIENT
Start: 2021-06-24 | End: 2021-09-27 | Stop reason: SDUPTHER

## 2021-06-24 SDOH — ECONOMIC STABILITY: FOOD INSECURITY: WITHIN THE PAST 12 MONTHS, YOU WORRIED THAT YOUR FOOD WOULD RUN OUT BEFORE YOU GOT MONEY TO BUY MORE.: NEVER TRUE

## 2021-06-24 SDOH — ECONOMIC STABILITY: FOOD INSECURITY: WITHIN THE PAST 12 MONTHS, THE FOOD YOU BOUGHT JUST DIDN'T LAST AND YOU DIDN'T HAVE MONEY TO GET MORE.: NEVER TRUE

## 2021-06-24 ASSESSMENT — SOCIAL DETERMINANTS OF HEALTH (SDOH): HOW HARD IS IT FOR YOU TO PAY FOR THE VERY BASICS LIKE FOOD, HOUSING, MEDICAL CARE, AND HEATING?: NOT VERY HARD

## 2021-06-24 NOTE — PROGRESS NOTES
Name: Yonny Villarreal  [id number]  Age: 76 y.o. YOB: 1945  Sex: female    CHIEF COMPLAINT:    Chief Complaint   Patient presents with    Diabetes    Hypertension    Other     other chronic conditions       HISTORY OF PRESENT ILLNESS:     This is a pleasant  76 y.o. female  is seen today for management of chronic medical problems and medications refills. Previous records reviewed . Doing OK . Denies CP or SOB. No fever , sore throat or cough or congestion. Still smokes @ 1 1/2 PPD. Not willing to quit at this time. Denies any abdominal pain. Appetite OK. Bowels moving [id number] No urinary symptoms. Still with pain in her knees but better. Hearing is ok. Vision Ok with glasses. Denies  any significant skin lesions. Depression and anxiety better with medications. Seeing Dr. Beebe Betters periodically. Does not check her sugars at home. No other complaints. She is going to have her first Covid vaccination yet. Past Medical History:    Patient Active Problem List   Diagnosis    [facility] 4 [facility]    DM (diabetes mellitus) ([facility]    HTN (hypertension)    Hyperlipidemia    Hypothyroidism    Anxiety disorder    Tobacco abuse    Chronic depression    RLS (restless legs syndrome)    Controlled type 2 diabetes mellitus with hyperglycemia, without long-term current use of insulin (HCC)    Chronic pain of right knee    Arthritis of both knees    Coagulation defect [facility])        Past Surgical History:    History reviewed. No pertinent surgical history.     Social History:   Social History     Tobacco Use    Smoking status: Current Every Day Smoker     Packs/day: 1.00     Years: 50.00     Pack years: 50.00     Types: Cigarettes    Smokeless tobacco: Never Used   Substance Use Topics    Alcohol use: No       Family History:       Problem Relation Age of Onset    Diabetes Father     High Blood Pressure Father     Kidney Disease Father     Cancer Brother     Stroke Maternal Grandmother     Cancer Paternal Grandmother        Allergies:  Seasonal    Current Medications :      Prior to Admission medications    Medication Sig Start Date End Date Taking? Authorizing Provider   potassium chloride (KLOR-CON M) 10 MEQ extended release tablet Take 1 tablet by mouth daily for 3 days 6/24/21 6/27/21 Yes Juan Morin MD   pioglitazone (ACTOS) 30 MG tablet Take 1 tablet by mouth daily 6/24/21  Yes Juan Morin MD   metoprolol tartrate (LOPRESSOR) 50 MG tablet Take 1 tablet by mouth 2 times daily Take 1.5tab bid 6/24/21  Yes Juan Morin MD   lisinopril (PRINIVIL;ZESTRIL) 20 MG tablet Take 1 tablet by mouth 2 times daily 6/24/21  Yes Juan Morin MD   levothyroxine (SYNTHROID) 88 MCG tablet Take 1 tablet by mouth Daily 6/24/21  Yes Juan Morni MD   glimepiride (AMARYL) 4 MG tablet Take 1 tablet by mouth 2 times daily Take 1.5 tabs 6/24/21  Yes Juan Morin MD   cloNIDine (CATAPRES) 0.2 MG tablet Take 1 tablet by mouth daily 6/24/21  Yes Juan Morin MD   chlorthalidone (HYGROTON) 25 MG tablet TAKE ONE TABLET BY MOUTH DAILY 6/24/21  Yes Juan Morin MD   atorvastatin (LIPITOR) 40 MG tablet Take 1 tablet by mouth daily 6/24/21  Yes Juan Morin MD   amLODIPine (NORVASC) 10 MG tablet Take 1 tablet by mouth daily 6/24/21  Yes Juan Morin MD   busPIRone (BUSPAR) 15 MG tablet Take 15 mg by mouth 3 times daily 9/28/20  Yes Juan Morin MD   LORazepam (ATIVAN) 1 MG tablet Take 1 mg by mouth every 8 hours as needed for Anxiety. Yes Historical Provider, MD   sertraline (ZOLOFT) 25 MG tablet Take 100 mg by mouth daily   Yes Historical Provider, MD       LAB DATA: Reviewed. REVIEW OF SYSTEMS:   see HPI/ Comprehensive review of systems negative except for the ones mentioned in HPI.     PHYSICAL EXAMINATION:   /84 (Site: Left Upper Arm, Position: Sitting, Cuff Size: Medium Adult)   Pulse 87   Ht 5' 5\" (1.651 m)   Wt 199 lb (90.3 kg)   SpO2 95%   BMI 33.12 kg/m²      GENERAL

## 2021-09-27 ENCOUNTER — OFFICE VISIT (OUTPATIENT)
Dept: INTERNAL MEDICINE CLINIC | Age: 76
End: 2021-09-27
Payer: MEDICARE

## 2021-09-27 VITALS
HEART RATE: 65 BPM | TEMPERATURE: 97.1 F | OXYGEN SATURATION: 95 % | WEIGHT: 199 LBS | SYSTOLIC BLOOD PRESSURE: 155 MMHG | BODY MASS INDEX: 33.12 KG/M2 | DIASTOLIC BLOOD PRESSURE: 91 MMHG

## 2021-09-27 VITALS
SYSTOLIC BLOOD PRESSURE: 155 MMHG | HEART RATE: 65 BPM | BODY MASS INDEX: 33.15 KG/M2 | OXYGEN SATURATION: 95 % | DIASTOLIC BLOOD PRESSURE: 91 MMHG | HEIGHT: 65 IN | WEIGHT: 199 LBS | TEMPERATURE: 97.1 F

## 2021-09-27 DIAGNOSIS — E11.22 TYPE 2 DIABETES MELLITUS WITH STAGE 4 CHRONIC KIDNEY DISEASE, WITHOUT LONG-TERM CURRENT USE OF INSULIN (HCC): Primary | ICD-10-CM

## 2021-09-27 DIAGNOSIS — E78.2 MIXED HYPERLIPIDEMIA: ICD-10-CM

## 2021-09-27 DIAGNOSIS — N95.1 SYMPTOMATIC MENOPAUSAL OR FEMALE CLIMACTERIC STATES: ICD-10-CM

## 2021-09-27 DIAGNOSIS — M17.0 ARTHRITIS OF BOTH KNEES: ICD-10-CM

## 2021-09-27 DIAGNOSIS — F41.9 ANXIETY: ICD-10-CM

## 2021-09-27 DIAGNOSIS — G25.81 RLS (RESTLESS LEGS SYNDROME): ICD-10-CM

## 2021-09-27 DIAGNOSIS — N18.4 TYPE 2 DIABETES MELLITUS WITH STAGE 4 CHRONIC KIDNEY DISEASE, WITHOUT LONG-TERM CURRENT USE OF INSULIN (HCC): Primary | ICD-10-CM

## 2021-09-27 DIAGNOSIS — F32.A CHRONIC DEPRESSION: ICD-10-CM

## 2021-09-27 DIAGNOSIS — Z12.31 ENCOUNTER FOR SCREENING MAMMOGRAM FOR MALIGNANT NEOPLASM OF BREAST: ICD-10-CM

## 2021-09-27 DIAGNOSIS — Z72.0 TOBACCO ABUSE: ICD-10-CM

## 2021-09-27 DIAGNOSIS — E03.4 HYPOTHYROIDISM DUE TO ACQUIRED ATROPHY OF THYROID: ICD-10-CM

## 2021-09-27 DIAGNOSIS — N18.4 STAGE 4 CHRONIC KIDNEY DISEASE (HCC): ICD-10-CM

## 2021-09-27 DIAGNOSIS — Z00.00 ROUTINE GENERAL MEDICAL EXAMINATION AT A HEALTH CARE FACILITY: Primary | ICD-10-CM

## 2021-09-27 DIAGNOSIS — I10 ESSENTIAL HYPERTENSION: ICD-10-CM

## 2021-09-27 PROBLEM — E11.65 CONTROLLED TYPE 2 DIABETES MELLITUS WITH HYPERGLYCEMIA, WITHOUT LONG-TERM CURRENT USE OF INSULIN (HCC): Status: RESOLVED | Noted: 2020-09-28 | Resolved: 2021-09-27

## 2021-09-27 PROBLEM — M25.561 CHRONIC PAIN OF RIGHT KNEE: Status: RESOLVED | Noted: 2020-09-28 | Resolved: 2021-09-27

## 2021-09-27 PROBLEM — G89.29 CHRONIC PAIN OF RIGHT KNEE: Status: RESOLVED | Noted: 2020-09-28 | Resolved: 2021-09-27

## 2021-09-27 PROBLEM — D68.9 COAGULATION DEFECT (HCC): Status: RESOLVED | Noted: 2021-06-24 | Resolved: 2021-09-27

## 2021-09-27 LAB
A/G RATIO: 1.5 (ref 1.1–2.2)
ALBUMIN SERPL-MCNC: 4 G/DL (ref 3.4–5)
ALP BLD-CCNC: 98 U/L (ref 40–129)
ALT SERPL-CCNC: 10 U/L (ref 10–40)
ANION GAP SERPL CALCULATED.3IONS-SCNC: 16 MMOL/L (ref 3–16)
AST SERPL-CCNC: 12 U/L (ref 15–37)
BASOPHILS ABSOLUTE: 0 K/UL (ref 0–0.2)
BASOPHILS RELATIVE PERCENT: 0.4 %
BILIRUB SERPL-MCNC: 0.4 MG/DL (ref 0–1)
BUN BLDV-MCNC: 39 MG/DL (ref 7–20)
CALCIUM SERPL-MCNC: 9.3 MG/DL (ref 8.3–10.6)
CHLORIDE BLD-SCNC: 100 MMOL/L (ref 99–110)
CHOLESTEROL, FASTING: 126 MG/DL (ref 0–199)
CHP ED QC CHECK: NORMAL
CO2: 25 MMOL/L (ref 21–32)
CREAT SERPL-MCNC: 1.6 MG/DL (ref 0.6–1.2)
CREATININE URINE: 84.7 MG/DL (ref 28–259)
EOSINOPHILS ABSOLUTE: 0.1 K/UL (ref 0–0.6)
EOSINOPHILS RELATIVE PERCENT: 1.4 %
GFR AFRICAN AMERICAN: 38
GFR NON-AFRICAN AMERICAN: 31
GLOBULIN: 2.7 G/DL
GLUCOSE BLD-MCNC: 206 MG/DL (ref 70–99)
GLUCOSE BLD-MCNC: 235 MG/DL
HCT VFR BLD CALC: 50.7 % (ref 36–48)
HDLC SERPL-MCNC: 40 MG/DL (ref 40–60)
HEMOGLOBIN: 16.9 G/DL (ref 12–16)
LDL CHOLESTEROL CALCULATED: 69 MG/DL
LYMPHOCYTES ABSOLUTE: 1.2 K/UL (ref 1–5.1)
LYMPHOCYTES RELATIVE PERCENT: 17.8 %
MCH RBC QN AUTO: 32.8 PG (ref 26–34)
MCHC RBC AUTO-ENTMCNC: 33.3 G/DL (ref 31–36)
MCV RBC AUTO: 98.4 FL (ref 80–100)
MICROALBUMIN UR-MCNC: 3.6 MG/DL
MICROALBUMIN/CREAT UR-RTO: 42.5 MG/G (ref 0–30)
MONOCYTES ABSOLUTE: 0.4 K/UL (ref 0–1.3)
MONOCYTES RELATIVE PERCENT: 5.7 %
NEUTROPHILS ABSOLUTE: 4.8 K/UL (ref 1.7–7.7)
NEUTROPHILS RELATIVE PERCENT: 74.7 %
PDW BLD-RTO: 14.7 % (ref 12.4–15.4)
PLATELET # BLD: 152 K/UL (ref 135–450)
PMV BLD AUTO: 10 FL (ref 5–10.5)
POTASSIUM SERPL-SCNC: 3.6 MMOL/L (ref 3.5–5.1)
RBC # BLD: 5.15 M/UL (ref 4–5.2)
SODIUM BLD-SCNC: 141 MMOL/L (ref 136–145)
TOTAL PROTEIN: 6.7 G/DL (ref 6.4–8.2)
TRIGLYCERIDE, FASTING: 87 MG/DL (ref 0–150)
VLDLC SERPL CALC-MCNC: 17 MG/DL
WBC # BLD: 6.5 K/UL (ref 4–11)

## 2021-09-27 PROCEDURE — 4040F PNEUMOC VAC/ADMIN/RCVD: CPT | Performed by: INTERNAL MEDICINE

## 2021-09-27 PROCEDURE — 2022F DILAT RTA XM EVC RTNOPTHY: CPT | Performed by: INTERNAL MEDICINE

## 2021-09-27 PROCEDURE — 1090F PRES/ABSN URINE INCON ASSESS: CPT | Performed by: INTERNAL MEDICINE

## 2021-09-27 PROCEDURE — 82962 GLUCOSE BLOOD TEST: CPT | Performed by: INTERNAL MEDICINE

## 2021-09-27 PROCEDURE — G8427 DOCREV CUR MEDS BY ELIG CLIN: HCPCS | Performed by: INTERNAL MEDICINE

## 2021-09-27 PROCEDURE — 3051F HG A1C>EQUAL 7.0%<8.0%: CPT | Performed by: INTERNAL MEDICINE

## 2021-09-27 PROCEDURE — 1123F ACP DISCUSS/DSCN MKR DOCD: CPT | Performed by: INTERNAL MEDICINE

## 2021-09-27 PROCEDURE — G0438 PPPS, INITIAL VISIT: HCPCS | Performed by: INTERNAL MEDICINE

## 2021-09-27 PROCEDURE — 4004F PT TOBACCO SCREEN RCVD TLK: CPT | Performed by: INTERNAL MEDICINE

## 2021-09-27 PROCEDURE — 36415 COLL VENOUS BLD VENIPUNCTURE: CPT | Performed by: INTERNAL MEDICINE

## 2021-09-27 PROCEDURE — G8417 CALC BMI ABV UP PARAM F/U: HCPCS | Performed by: INTERNAL MEDICINE

## 2021-09-27 PROCEDURE — 3017F COLORECTAL CA SCREEN DOC REV: CPT | Performed by: INTERNAL MEDICINE

## 2021-09-27 PROCEDURE — G8400 PT W/DXA NO RESULTS DOC: HCPCS | Performed by: INTERNAL MEDICINE

## 2021-09-27 PROCEDURE — 99214 OFFICE O/P EST MOD 30 MIN: CPT | Performed by: INTERNAL MEDICINE

## 2021-09-27 RX ORDER — LISINOPRIL 20 MG/1
20 TABLET ORAL 2 TIMES DAILY
Qty: 60 TABLET | Refills: 3 | Status: SHIPPED | OUTPATIENT
Start: 2021-09-27 | End: 2021-12-29 | Stop reason: SDUPTHER

## 2021-09-27 RX ORDER — METOPROLOL TARTRATE 50 MG/1
50 TABLET, FILM COATED ORAL 2 TIMES DAILY
Qty: 60 TABLET | Refills: 3 | Status: SHIPPED | OUTPATIENT
Start: 2021-09-27 | End: 2022-04-05

## 2021-09-27 RX ORDER — CHLORTHALIDONE 25 MG/1
TABLET ORAL
Qty: 30 TABLET | Refills: 3 | Status: SHIPPED | OUTPATIENT
Start: 2021-09-27 | End: 2021-12-29 | Stop reason: SDUPTHER

## 2021-09-27 RX ORDER — ATORVASTATIN CALCIUM 40 MG/1
40 TABLET, FILM COATED ORAL DAILY
Qty: 30 TABLET | Refills: 3 | Status: SHIPPED | OUTPATIENT
Start: 2021-09-27 | End: 2022-03-16

## 2021-09-27 RX ORDER — AMLODIPINE BESYLATE 10 MG/1
10 TABLET ORAL DAILY
Qty: 30 TABLET | Refills: 3 | Status: SHIPPED | OUTPATIENT
Start: 2021-09-27 | End: 2021-12-29 | Stop reason: SDUPTHER

## 2021-09-27 RX ORDER — PIOGLITAZONEHYDROCHLORIDE 30 MG/1
30 TABLET ORAL DAILY
Qty: 30 TABLET | Refills: 3 | Status: ON HOLD
Start: 2021-09-27 | End: 2022-06-10 | Stop reason: HOSPADM

## 2021-09-27 RX ORDER — LEVOTHYROXINE SODIUM 88 UG/1
88 TABLET ORAL DAILY
Qty: 30 TABLET | Refills: 3 | Status: SHIPPED | OUTPATIENT
Start: 2021-09-27 | End: 2021-12-29 | Stop reason: SDUPTHER

## 2021-09-27 RX ORDER — GLIMEPIRIDE 4 MG/1
4 TABLET ORAL 2 TIMES DAILY
Qty: 60 TABLET | Refills: 3 | Status: SHIPPED | OUTPATIENT
Start: 2021-09-27 | End: 2022-04-21

## 2021-09-27 RX ORDER — CLONIDINE HYDROCHLORIDE 0.2 MG/1
0.2 TABLET ORAL DAILY
Qty: 30 TABLET | Refills: 3 | Status: SHIPPED | OUTPATIENT
Start: 2021-09-27 | End: 2021-12-29

## 2021-09-27 RX ORDER — BUSPIRONE HYDROCHLORIDE 15 MG/1
15 TABLET ORAL 3 TIMES DAILY
Qty: 90 TABLET | Refills: 3 | Status: CANCELLED | OUTPATIENT
Start: 2021-09-27

## 2021-09-27 ASSESSMENT — PATIENT HEALTH QUESTIONNAIRE - PHQ9
1. LITTLE INTEREST OR PLEASURE IN DOING THINGS: 0
SUM OF ALL RESPONSES TO PHQ QUESTIONS 1-9: 2
SUM OF ALL RESPONSES TO PHQ9 QUESTIONS 1 & 2: 2
2. FEELING DOWN, DEPRESSED OR HOPELESS: 2
SUM OF ALL RESPONSES TO PHQ QUESTIONS 1-9: 2
SUM OF ALL RESPONSES TO PHQ QUESTIONS 1-9: 2

## 2021-09-27 ASSESSMENT — LIFESTYLE VARIABLES: HOW OFTEN DO YOU HAVE A DRINK CONTAINING ALCOHOL: 0

## 2021-09-27 NOTE — PROGRESS NOTES
Medicare Annual Wellness Visit  Name: Giselle Luis Date: 2021   MRN: [id number] Sex: Female   Age: 76 y.o. Ethnicity: Non- / Non    : 1945 Race: White (non-)      Carmine Hoang is here for Medicare AWV    Screenings for behavioral, psychosocial and functional/safety risks, and cognitive dysfunction are all negative except as indicated below. These results, as well as other patient data from the [address] [facility] [address] form, are documented in Flowsheets linked to this Encounter. Allergies   Allergen Reactions    Seasonal        Prior to Visit Medications    Medication Sig Taking? Authorizing Provider   amLODIPine (NORVASC) 10 MG tablet Take 1 tablet by mouth daily Yes Yoni Means MD   atorvastatin (LIPITOR) 40 MG tablet Take 1 tablet by mouth daily Yes Yoni Means MD   chlorthalidone (HYGROTON) 25 MG tablet TAKE ONE TABLET BY MOUTH DAILY Yes Yoni Means MD   cloNIDine (CATAPRES) 0.2 MG tablet Take 1 tablet by mouth daily Yes Yoni Means MD   glimepiride (AMARYL) 4 MG tablet Take 1 tablet by mouth 2 times daily Take 1.5 tabs bid Yes Yoni Means MD   levothyroxine (SYNTHROID) 88 MCG tablet Take 1 tablet by mouth Daily Yes Yoni Means MD   lisinopril (PRINIVIL;ZESTRIL) 20 MG tablet Take 1 tablet by mouth 2 times daily Yes Yoni Means MD   metoprolol tartrate (LOPRESSOR) 50 MG tablet Take 1 tablet by mouth 2 times daily Take 1.5tab bid Yes Yoni Means MD   pioglitazone (ACTOS) 30 MG tablet Take 1 tablet by mouth daily Yes Yoni Means MD   potassium chloride (KLOR-CON M) 10 MEQ extended release tablet Take 1 tablet by mouth daily for 3 days Yes Yoni Means MD   busPIRone (BUSPAR) 15 MG tablet Take 15 mg by mouth 3 times daily Yes Yoni Means MD   LORazepam (ATIVAN) 1 MG tablet Take 1 mg by mouth every 8 hours as needed for Anxiety.   Yes Historical Provider, MD   sertraline (ZOLOFT) 25 MG tablet Take 100 mg by mouth daily Yes Historical Provider, MD Past Medical History:   Diagnosis Date    Acute kidney failure ([facility] [address] 2/16/2018    Chronic kidney disease     Hypertension     Hypothyroidism     Type 2 diabetes mellitus without complication ([facility] [address]        History reviewed. No pertinent surgical history. Family History   Problem Relation Age of Onset    Diabetes Father     High Blood Pressure Father     Kidney Disease Father     Cancer Brother         unknown origin    Stroke Maternal Grandmother     Cancer Paternal Grandmother     Other Mother         hysterectomy    No Known Problems Brother        CareTeam (Including outside providers/suppliers regularly involved in providing care):   Patient Care Team:  Gertrude Vazquez MD as PCP - General (Internal Medicine)  Gertrude Vazquez MD as PCP - [facility] Provider    Wt Readings from Last 3 Encounters:   09/27/21 199 lb (90.3 kg)   09/27/21 199 lb (90.3 kg)   06/24/21 199 lb (90.3 kg)     Vitals:    09/27/21 0903   BP: (!) 155/91   Pulse: 65   Temp: 97.1 °F (36.2 °C)   SpO2: 95%   Weight: 199 lb (90.3 kg)   Height: 5' 5\" (1.651 m)     Body mass index is 33.12 kg/m². Based upon direct observation of the patient, evaluation of cognition reveals recent and remote memory intact. Patient's complete Health Risk Assessment and screening values have been reviewed and are found in Flowsheets. The following problems were reviewed today and where indicated follow up appointments were made and/or referrals ordered. Positive Risk Factor Screenings with Interventions:     Fall Risk:  Timed Up and Go Test > 12 seconds?  (Complete if either Fall Risk answers are Yes): no  2 or more falls in past year?: (!) yes  Fall with injury in past year?: no  Fall Risk Interventions:    · Home safety tips provided       Substance History:  Social History     Tobacco History     Smoking Status  Current Every Day Smoker Smoking Start Date  1/1/1962 Smoking Frequency  1 pack/day for 59 years (61 pk yrs) Smoking Tobacco Type  Cigarettes    Smokeless Tobacco Use  Never Used          Alcohol History     Alcohol Use Status  No          Drug Use     Drug Use Status  Not Asked          Sexual Activity     Sexually Active  Not Asked               Alcohol Screening:       A score of 8 or more is associated with harmful or hazardous drinking. A score of 13 or more in women, and 15 or more in men, is likely to indicate alcohol dependence. Substance Abuse Interventions:  · Tobacco abuse:  tobacco cessation tips and resources provided    General Health and ACP:  General  In general, how would you say your health is?: Fair  In the past 7 days, have you experienced any of the following?  New or Increased Pain, New or Increased Fatigue, Loneliness, Social Isolation, Stress or Anger?: (!) New or Increased Pain, Loneliness, Social Isolation (knee pain due to doing yardwork)  Do you get the social and emotional support that you need?: (!) No  Do you have a Living Will?: (!) No  Advance Directives     Power of  Living Will ACP-Advance Directive ACP-Power of     Not on File Not on File Not on File Not on File      General Health Risk Interventions:  · Pain issues: patient declines any further evaluation/treatment for this issue  · Loneliness: patient's comments regarding inadequate social support: Patient states she will get lonely at times  · Social isolation: patient's comments regarding inadequate social support: Patient states she does feel socially isolated, but it's usually because she chooses to be isolated  · Inadequate social/emotional support: patient's comments regarding inadequate social support: Patient states she has been speaking to a [facility] counselor, but states they \"don't click\", suggested that she request to be put with someone else, which, she said she will request to be changed to another counselor  · No Living Will: Advance Care Planning addressed with patient today, information provided to patient    Health Habits/Nutrition:  Health Habits/Nutrition  Do you exercise for at least 20 minutes 2-3 times per week?: (!) No  Have you lost any weight without trying in the past 3 months?: No  Do you eat only one meal per day?: No  Have you seen the dentist within the past year?: N/A - wear dentures  Body mass index: (!) 33.11  Health Habits/Nutrition Interventions:  · Inadequate physical activity:  educational materials provided to promote increased physical activity  · Nutritional issues:  educational materials for healthy, well-balanced diet provided, educational materials to promote weight loss provided    Hearing/Vision:  No exam data present  Hearing/Vision  Do you or your family notice any trouble with your hearing that hasn't been managed with hearing aids?: (!) Yes  Do you have difficulty driving, watching TV, or doing any of your daily activities because of your eyesight?: No  Have you had an eye exam within the past year?: (!) No  Hearing/Vision Interventions:  · Hearing concerns:  patient declines any further evaluation/treatment for hearing issues  · Vision concerns:  patient encouraged to make appointment with his/her eye specialist    Safety:  Safety  Do you have working smoke detectors?: Yes  Have all throw rugs been removed or fastened?: Yes  Do you have non-slip mats or surfaces in all bathtubs/showers?: Yes  Do all of your stairways have a railing or banister?: (!) No  Are your doorways, halls and stairs free of clutter?: Yes  Do you always fasten your seatbelt when you are in a car?: (!) No  Safety Interventions:  · Home safety tips provided     Personalized Preventive Plan   Current Health Maintenance Status  Immunization History   Administered Date(s) Administered    COVID-19, Pfizer, PF, 30mcg/0.3mL 06/24/2021, 07/15/2021    Influenza Vaccine, unspecified formulation 01/16/2018    Influenza, Quadv, adjuvanted, 65 yrs +, IM, PF (Fluad) 09/28/2020        Health Maintenance   Topic Date Due    Hepatitis C screen  Never done    Diabetic foot exam  Never done    DTaP/Tdap/Td vaccine (1 - Tdap) Never done    Shingles Vaccine (1 of 2) Never done    Low dose CT lung screening  Never done    DEXA (modify frequency per FRAX score)  Never done    Pneumococcal 65+ yrs at Risk Vaccine (1 of 2 - PCV13) Never done    Diabetic retinal exam  01/09/2019    Annual Wellness Visit (AWV)  Never done    Flu vaccine (1) 09/01/2021    Lipid screen  12/29/2021    TSH testing  12/29/2021    A1C test (Diabetic or Prediabetic)  03/24/2022    Potassium monitoring  03/24/2022    Creatinine monitoring  03/24/2022    Colon cancer screen colonoscopy  12/21/2028    COVID-19 Vaccine  Completed    Hepatitis A vaccine  Aged Out    Hib vaccine  Aged Out    Meningococcal (ACWY) vaccine  Aged Out     Recommendations for [employer] Due: see orders and patient instructions/AVS. Patient saw PCP prior to AWV. Recommended screening schedule for the next 5-10 years is provided to the patient in written form: see Patient Instructions/AVS.    Jayson GUTIERRES LPN, 8/98/0184, performed the documented evaluation under the direct supervision of the attending physician.

## 2021-09-27 NOTE — PROGRESS NOTES
Name: Veronica Harley  [id number]  Age: 76 y.o. YOB: 1945  Sex: female    CHIEF COMPLAINT:    Chief Complaint   Patient presents with    Diabetes     pt. hasn't taken any of her meds yet       HISTORY OF PRESENT ILLNESS:     This is a pleasant  76 y.o. female  is seen today for management of chronic medical problems and medications refills. Previous records reviewed . Doing OK . Denies CP or SOB. No fever , sore throat or cough or congestion. Still smokes @ 1 1/2 PPD. Not willing to quit at this time but will try to slow down on smoking. Denies any abdominal pain. Appetite OK. Bowels moving [id number] No urinary symptoms. Still with pain in her knees but better. Hearing is ok. Vision Ok with glasses. Denies  any significant skin lesions. Depression and anxiety better with medications. Seeing Dr. Berna Ann periodically. Does not check her sugars at home. No other complaints. She is fully vaccinated for Covid 19. She did not see her nephrologist and ophthalmologist for some time. She did not go for mammogram and bone density yet. Past Medical History:    Patient Active Problem List   Diagnosis    Stage 4 chronic kidney disease ([facility] [address]    DM (diabetes mellitus) ([facility] [address]    HTN (hypertension)    Hyperlipidemia    Hypothyroidism    Anxiety disorder    Tobacco abuse    Chronic depression    RLS (restless legs syndrome)    Arthritis of both knees        Past Surgical History:    No past surgical history on file.     Social History:   Social History     Tobacco Use    Smoking status: Current Every Day Smoker     Packs/day: 1.00     Years: 59.00     Pack years: 59.00     Types: Cigarettes     Start date: 1962    Smokeless tobacco: Never Used   Substance Use Topics    Alcohol use: No       Family History:       Problem Relation Age of Onset    Diabetes Father     High Blood Pressure Father     Kidney Disease Father     Cancer Brother         unknown origin    Stroke Maternal Grandmother     Cancer Paternal Grandmother     Other Mother         hysterectomy    No Known Problems Brother        Allergies:  Seasonal    Current Medications :      Prior to Admission medications    Medication Sig Start Date End Date Taking? Authorizing Provider   amLODIPine (NORVASC) 10 MG tablet Take 1 tablet by mouth daily 9/27/21  Yes Giovanna Swift MD   atorvastatin (LIPITOR) 40 MG tablet Take 1 tablet by mouth daily 9/27/21  Yes Giovanna Swift MD   chlorthalidone (HYGROTON) 25 MG tablet TAKE ONE TABLET BY MOUTH DAILY 9/27/21  Yes Giovanna Swift MD   cloNIDine (CATAPRES) 0.2 MG tablet Take 1 tablet by mouth daily 9/27/21  Yes Giovanna Swift MD   glimepiride (AMARYL) 4 MG tablet Take 1 tablet by mouth 2 times daily Take 1.5 tabs bid 9/27/21  Yes Giovanna Swift MD   levothyroxine (SYNTHROID) 88 MCG tablet Take 1 tablet by mouth Daily 9/27/21  Yes Giovanna Swift MD   lisinopril (PRINIVIL;ZESTRIL) 20 MG tablet Take 1 tablet by mouth 2 times daily 9/27/21  Yes Giovanna Swift MD   metoprolol tartrate (LOPRESSOR) 50 MG tablet Take 1 tablet by mouth 2 times daily Take 1.5tab bid 9/27/21  Yes Giovanna Swift MD   pioglitazone (ACTOS) 30 MG tablet Take 1 tablet by mouth daily 9/27/21  Yes Giovanna Swift MD   potassium chloride (KLOR-CON M) 10 MEQ extended release tablet Take 1 tablet by mouth daily for 3 days 6/24/21 9/27/21 Yes Giovanna Swift MD   busPIRone (BUSPAR) 15 MG tablet Take 15 mg by mouth 3 times daily 9/28/20  Yes Giovanna Swift MD   LORazepam (ATIVAN) 1 MG tablet Take 1 mg by mouth every 8 hours as needed for Anxiety. Yes Historical Provider, MD   sertraline (ZOLOFT) 25 MG tablet Take 100 mg by mouth daily   Yes Historical Provider, MD       LAB DATA: Reviewed. REVIEW OF SYSTEMS:   see HPI/ Comprehensive review of systems negative except for the ones mentioned in HPI.     PHYSICAL EXAMINATION:   BP (!) 155/91   Pulse 65   Temp 97.1 °F (36.2 °C)   Wt 199 lb (90.3 kg)   SpO2 95%   BMI 33.12 kg/m² GENERAL APPEARANCE:    Alert, oriented x 3, well developed, cooperative, not in any distress, appears stated age. HEAD:   Normocephalic, atraumatic   EYES:   PERRLA, EOMI, lids normal, conjuctivea clear, sclera anicteric. NECK:    Supple, symmetrical,  trachea midline, no thyromegaly, no JVD, no lymphadenopathy. LUNGS:    Clear to auscultation bilaterally, respirations unlabored, accessory muscles are not used. HEART:     Regular rate and rhythm, S1 and S2 normal, no murmur, rub or gallop. PMI in MCL. ABDOMEN:    Soft, non-tender, bowel sounds are normoactive, no masses, no hepatospleenomegaly. EXTREMITY:   no bipedal edema, mild tenderness of both knees. NEURO:  Alert, oriented to person, place and time. Grossly intact. Musculoskeletal:         No kyphosis or scoliosis, no deformity in any extremity noted, muscle strength and tone are normal.  Skin:                            Warm and dry. No rash or obvious suspicious lesions. PSYCH:  Mood euthymic, insight and judgement good. ASSESSMENT/PLAN:    1. Type 2 diabetes mellitus with stage 4 chronic kidney disease, without long-term current use of insulin ([facility])  Advised low sugar diet and exercise. Continue Actos and Amaryl. Advised to check her sugars at home. Patient does not want to check sugars at home. - POCT Glucose  - glimepiride (AMARYL) 4 MG tablet; Take 1 tablet by mouth 2 times daily Take 1.5 tabs bid  Dispense: 60 tablet; Refill: 3  - pioglitazone (ACTOS) 30 MG tablet; Take 1 tablet by mouth daily  Dispense: 30 tablet; Refill: 3  - Hemoglobin A1C  - Amb External Referral To Ophthalmology  - Microalbumin / Creatinine Urine Ratio    2. Stage 4 chronic kidney disease ([facility] [address]  Advised to continue to follow with her nephrologist.  Avoid NSAID and keep well-hydrated. - AFL ([facility]) - Milli Collazo MD, Nephrology, [place]    3.  Essential hypertension  Stable,Continue current medications, denies side effect with medicationss. Low salt diet and exercise advised. On multiple medication for blood pressure control. Advised to see her nephrologist.  - amLODIPine (NORVASC) 10 MG tablet; Take 1 tablet by mouth daily  Dispense: 30 tablet; Refill: 3  - chlorthalidone (HYGROTON) 25 MG tablet; TAKE ONE TABLET BY MOUTH DAILY  Dispense: 30 tablet; Refill: 3  - cloNIDine (CATAPRES) 0.2 MG tablet; Take 1 tablet by mouth daily  Dispense: 30 tablet; Refill: 3  - lisinopril (PRINIVIL;ZESTRIL) 20 MG tablet; Take 1 tablet by mouth 2 times daily  Dispense: 60 tablet; Refill: 3  - metoprolol tartrate (LOPRESSOR) 50 MG tablet; Take 1 tablet by mouth 2 times daily Take 1.5tab bid  Dispense: 60 tablet; Refill: 3  - Comprehensive Metabolic Panel  - CBC Auto Differential    4. Mixed hyperlipidemia  Patient is taking cholesterol medications regularly. Denies any side effects. Diet and exercise advised. Continue Lipitor  - atorvastatin (LIPITOR) 40 MG tablet; Take 1 tablet by mouth daily  Dispense: 30 tablet; Refill: 3  - Lipid, Fasting    5. Hypothyroidism due to acquired atrophy of thyroid  Continue Synthroid  - levothyroxine (SYNTHROID) 88 MCG tablet; Take 1 tablet by mouth Daily  Dispense: 30 tablet; Refill: 3    6. Arthritis of both knees  Tylenol as needed    7. RLS (restless legs syndrome)  Not bothering her much at this time. Not on any medication at this time. 8. Chronic depression  Advised to continue to follow with her psychiatrist and take psych medications regularly    9. Anxiety  Advised to continue to follow with her psychiatrist and take psych medications regularly. 10. Tobacco abuse  Advised strongly to quit smoking. 11. Symptomatic menopausal or female climacteric states  - DEXA BONE DENSITY AXIAL SKELETON; Future    12. Encounter for screening mammogram for malignant neoplasm of breast  - DUGLAS DIGITAL SCREEN W CAD BILATERAL; Future    Advised to follow COVID-19 precautions    Care discussed with patient.  Questions answered and patient verbalizes understanding and agrees with plan. Medications reviewed and reconciled. Continue current medications. Appropriate prescriptions are ordered. Risks and benefits of meds are discussed. After visit summary provided. Advised to call for any problems, questions, or concerns. If symptoms worsen or don't improve as expected, to call us or go to ER. Follow up as directed, sooner if needed. No follow-ups on file. This dictation was performed with a verbal recognition program and it was checked for errors. It is possible that there are still dictated errors within this office note. Any errors should be brought immediately to my attention for correction. All efforts were made to ensure that this office note is accurate.      Doug Astudillo MD MD

## 2021-09-27 NOTE — PATIENT INSTRUCTIONS
Personalized Preventive Plan for Monica Little - 9/27/2021  Medicare offers a range of preventive health benefits. Some of the tests and screenings are paid in full while other may be subject to a deductible, co-insurance, and/or copay. Some of these benefits include a comprehensive review of your medical history including lifestyle, illnesses that may run in your family, and various assessments and screenings as appropriate. After reviewing your medical record and screening and assessments performed today your provider may have ordered immunizations, labs, imaging, and/or referrals for you. A list of these orders (if applicable) as well as your Preventive Care list are included within your After Visit Summary for your review. Other Preventive Recommendations:    · A preventive eye exam performed by an eye specialist is recommended every 1-2 years to screen for glaucoma; cataracts, macular degeneration, and other eye disorders. · A preventive dental visit is recommended every 6 months. · Try to get at least 150 minutes of exercise per week or 10,000 steps per day on a pedometer . · Order or download the FREE \"Exercise & Physical Activity: Your Everyday Guide\" from [employer]. Call 1-552.743.5905 or search [employer] online. · You need 9531-2547 mg of calcium and 5026-1655 IU of vitamin D per day. It is possible to meet your calcium requirement with diet alone, but a vitamin D supplement is usually necessary to meet this goal.  · When exposed to the sun, use a sunscreen that protects against both UVA and UVB radiation with an SPF of 30 or greater. Reapply every 2 to 3 hours or after sweating, drying off with a towel, or swimming. · Always wear a seat belt when traveling in a car. Always wear a helmet when riding a bicycle or motorcycle. Personalized Preventive Plan for Monica Little - 9/27/2021  Medicare offers a range of preventive health benefits.  Some of the tests and screenings are paid in full while other may be subject to a deductible, co-insurance, and/or copay. Some of these benefits include a comprehensive review of your medical history including lifestyle, illnesses that may run in your family, and various assessments and screenings as appropriate. After reviewing your medical record and screening and assessments performed today your provider may have ordered immunizations, labs, imaging, and/or referrals for you. A list of these orders (if applicable) as well as your Preventive Care list are included within your After Visit Summary for your review. Other Preventive Recommendations:    A preventive eye exam performed by an eye specialist is recommended every 1-2 years to screen for glaucoma; cataracts, macular degeneration, and other eye disorders. A preventive dental visit is recommended every 6 months. Try to get at least 150 minutes of exercise per week or 10,000 steps per day on a pedometer . Order or download the FREE \"Exercise & Physical Activity: Your Everyday Guide\" from [employer]. Call 2-472.603.7846 or search [employer] online. You need 5856-3774 mg of calcium and 7775-1313 IU of vitamin D per day. It is possible to meet your calcium requirement with diet alone, but a vitamin D supplement is usually necessary to meet this goal.  When exposed to the sun, use a sunscreen that protects against both UVA and UVB radiation with an SPF of 30 or greater. Reapply every 2 to 3 hours or after sweating, drying off with a towel, or swimming. Always wear a seat belt when traveling in a car. Always wear a helmet when riding a bicycle or motorcycle. Heart-Healthy Diet   Sodium, Fat, and Cholesterol Controlled Diet       What Is a Heart Healthy Diet? A heart-healthy diet is one that limits sodium , certain types of fat , and cholesterol .  This type of diet is recommended for:   People with any form of cardiovascular disease (eg, coronary heart disease , peripheral vascular disease , previous heart attack , previous stroke )   People with risk factors for cardiovascular disease, such as high blood pressure , high cholesterol , or diabetes   Anyone who wants to lower their risk of developing cardiovascular disease   Sodium    Sodium is a mineral found in many foods. In general, most people consume much more sodium than they need. Diets high in sodium can increase blood pressure and lead to edema (water retention). On a heart-healthy diet, you should consume no more than 2,300 mg (milligrams) of sodium per dayabout the amount in one teaspoon of table salt. The foods highest in sodium include table salt (about 50% sodium), processed foods, convenience foods, and preserved foods. Cholesterol    Cholesterol is a fat-like, waxy substance in your blood. Our bodies make some cholesterol. It is also found in animal products, with the highest amounts in fatty meat, egg yolks, whole milk, cheese, shellfish, and organ meats. On a heart-healthy diet, you should limit your cholesterol intake to less than 200 mg per day. It is normal and important to have some cholesterol in your bloodstream. But too much cholesterol can cause plaque to build up within your arteries, which can eventually lead to a heart attack or stroke. The two types of cholesterol that are most commonly referred to are:   Low-density lipoprotein (LDL) cholesterol  Also known as bad cholesterol, this is the cholesterol that tends to build up along your arteries. Bad cholesterol levels are increased by eating fats that are saturated or hydrogenated. Optimal level of this cholesterol is less than 100. Over 130 starts to get risky for heart disease.    High-density lipoprotein (HDL) cholesterol  Also known as good cholesterol, this type of cholesterol actually carries cholesterol away from your arteries and may, therefore, help lower your risk of having a heart attack. You want this level to be high (ideally greater than 60). It is a risk to have a level less than 40. You can raise this good cholesterol by eating olive oil, canola oil, avocados, or nuts. Exercise raises this level, too. Fat    Fat is calorie dense and packs a lot of calories into a small amount of food. Even though fats should be limited due to their high calorie content, not all fats are bad. In fact, some fats are quite healthful. Fat can be broken down into four main types. The good-for-you fats are:   Monounsaturated fat  found in oils such as olive and canola, avocados, and nuts and natural nut butters; can decrease cholesterol levels, while keeping levels of HDL cholesterol high   Polyunsaturated fat  found in oils such as safflower, sunflower, soybean, corn, and sesame; can decrease total cholesterol and LDL cholesterol   Omega-3 fatty acids  particularly those found in fatty fish (such as salmon, trout, tuna, mackerel, herring, and sardines); can decrease risk of arrhythmias, decrease triglyceride levels, and slightly lower blood pressure   The fats that you want to limit are:   Saturated fat  found in animal products, many fast foods, and a few vegetables; increases total blood cholesterol, including LDL levels   Animal fats that are saturated include: butter, lard, whole-milk dairy products, meat fat, and poultry skin   Vegetable fats that are saturated include: hydrogenated shortening, palm oil, coconut oil, cocoa butter   Hydrogenated or trans fat  found in margarine and vegetable shortening, most shelf stable snack foods, and fried foods; increases LDL and decreases HDL     It is generally recommended that you limit your total fat for the day to less than 30% of your total calories. If you follow an 1800-calorie heart healthy diet, for example, this would mean 60 grams of fat or less per day.    Saturated fat and trans fat in your diet raises your blood cholesterol the most, much more than dietary cholesterol does. For this reason, on a heart-healthy diet, less than 7% of your calories should come from saturated fat and ideally 0% from trans fat. On an 1800-calorie diet, this translates into less than 14 grams of saturated fat per day, leaving 46 grams of fat to come from mono- and polyunsaturated fats.    Food Choices on a Heart Healthy Diet   Food Category   Foods Recommended   Foods to Avoid   Grains   Breads and rolls without salted tops Most dry and cooked cereals Unsalted crackers and breadsticks Low-sodium or homemade breadcrumbs or stuffing All rice and pastas   Breads, rolls, and crackers with salted tops High-fat baked goods (eg, muffins, donuts, pastries) Quick breads, self-rising flour, and biscuit mixes Regular bread crumbs Instant hot cereals Commercially prepared rice, pasta, or stuffing mixes   Vegetables   Most fresh, frozen, and low-sodium canned vegetables Low-sodium and salt-free vegetable juices Canned vegetables if unsalted or rinsed   Regular canned vegetables and juices, including sauerkraut and pickled vegetables Frozen vegetables with sauces Commercially prepared potato and vegetable mixes   Fruits   Most fresh, frozen, and canned fruits All fruit juices   Fruits processed with salt or sodium   Milk   Nonfat or low-fat (1%) milk Nonfat or low-fat yogurt Cottage cheese, low-fat ricotta, cheeses labeled as low-fat and low-sodium   Whole milk Reduced-fat (2%) milk Malted and chocolate milk Full fat yogurt Most cheeses (unless low-fat and low salt) Buttermilk (no more than 1 cup per week)   Meats and Beans   Lean cuts of fresh or frozen beef, veal, lamb, or pork (look for the word loin) Fresh or frozen poultry without the skin Fresh or frozen fish and some shellfish Egg whites and egg substitutes (Limit whole eggs to three per week) Tofu Nuts or seeds (unsalted, dry-roasted), low-sodium peanut butter Dried peas, beans, and lentils   Any smoked, cured, salted, or canned meat, when cooking or at the table; if food needs more flavor, get creative and try out different herbs and spices. Garlic and onion also add substantial flavor to foods. Trim any visible fat off meat and poultry before cooking, and drain the fat off after jones. Use cooking methods that require little or no added fat, such as grilling, boiling, baking, poaching, broiling, roasting, steaming, stir-frying, and sauting. Avoid fast food and convenience food. They tend to be high in saturated and trans fat and have a lot of added salt. Talk to a registered dietitian for individualized diet advice. Last Reviewed: March 2011 Ivon Snow MS, MPH, RD   Updated: 3/29/2011       Heart-Healthy Diet   Sodium, Fat, and Cholesterol Controlled Diet       What Is a Heart Healthy Diet? A heart-healthy diet is one that limits sodium , certain types of fat , and cholesterol . This type of diet is recommended for:   People with any form of cardiovascular disease (eg, coronary heart disease , peripheral vascular disease , previous heart attack , previous stroke )   People with risk factors for cardiovascular disease, such as high blood pressure , high cholesterol , or diabetes   Anyone who wants to lower their risk of developing cardiovascular disease   Sodium    Sodium is a mineral found in many foods. In general, most people consume much more sodium than they need. Diets high in sodium can increase blood pressure and lead to edema (water retention). On a heart-healthy diet, you should consume no more than 2,300 mg (milligrams) of sodium per dayabout the amount in one teaspoon of table salt. The foods highest in sodium include table salt (about 50% sodium), processed foods, convenience foods, and preserved foods. Cholesterol    Cholesterol is a fat-like, waxy substance in your blood. Our bodies make some cholesterol.  It is also found in animal products, with the highest amounts in fatty meat, egg yolks, whole milk, cheese, shellfish, and organ meats. On a heart-healthy diet, you should limit your cholesterol intake to less than 200 mg per day. It is normal and important to have some cholesterol in your bloodstream. But too much cholesterol can cause plaque to build up within your arteries, which can eventually lead to a heart attack or stroke. The two types of cholesterol that are most commonly referred to are:   Low-density lipoprotein (LDL) cholesterol  Also known as bad cholesterol, this is the cholesterol that tends to build up along your arteries. Bad cholesterol levels are increased by eating fats that are saturated or hydrogenated. Optimal level of this cholesterol is less than 100. Over 130 starts to get risky for heart disease. High-density lipoprotein (HDL) cholesterol  Also known as good cholesterol, this type of cholesterol actually carries cholesterol away from your arteries and may, therefore, help lower your risk of having a heart attack. You want this level to be high (ideally greater than 60). It is a risk to have a level less than 40. You can raise this good cholesterol by eating olive oil, canola oil, avocados, or nuts. Exercise raises this level, too. Fat    Fat is calorie dense and packs a lot of calories into a small amount of food. Even though fats should be limited due to their high calorie content, not all fats are bad. In fact, some fats are quite healthful. Fat can be broken down into four main types.    The good-for-you fats are:   Monounsaturated fat  found in oils such as olive and canola, avocados, and nuts and natural nut butters; can decrease cholesterol levels, while keeping levels of HDL cholesterol high   Polyunsaturated fat  found in oils such as safflower, sunflower, soybean, corn, and sesame; can decrease total cholesterol and LDL cholesterol   Omega-3 fatty acids  particularly those found in fatty fish (such as salmon, trout, tuna, mackerel, herring, and sardines); can decrease Regular canned vegetables and juices, including sauerkraut and pickled vegetables Frozen vegetables with sauces Commercially prepared potato and vegetable mixes   Fruits   Most fresh, frozen, and canned fruits All fruit juices   Fruits processed with salt or sodium   Milk   Nonfat or low-fat (1%) milk Nonfat or low-fat yogurt Cottage cheese, low-fat ricotta, cheeses labeled as low-fat and low-sodium   Whole milk Reduced-fat (2%) milk Malted and chocolate milk Full fat yogurt Most cheeses (unless low-fat and low salt) Buttermilk (no more than 1 cup per week)   Meats and Beans   Lean cuts of fresh or frozen beef, veal, lamb, or pork (look for the word loin) Fresh or frozen poultry without the skin Fresh or frozen fish and some shellfish Egg whites and egg substitutes (Limit whole eggs to three per week) Tofu Nuts or seeds (unsalted, dry-roasted), low-sodium peanut butter Dried peas, beans, and lentils   Any smoked, cured, salted, or canned meat, fish, or poultry (including moreno, chipped beef, cold cuts, hot dogs, sausages, sardines, and anchovies) Poultry skins Breaded and/or fried fish or meats Canned peas, beans, and lentils Salted nuts   Fats and Oils   Olive oil and canola oil Low-sodium, low-fat salad dressings and mayonnaise   Butter, margarine, coconut and palm oils, moreno fat   Snacks, Sweets, and Condiments   Low-sodium or unsalted versions of broths, soups, soy sauce, and condiments Pepper, herbs, and spices; vinegar, lemon, or lime juice Low-fat frozen desserts (yogurt, sherbet, fruit bars) Sugar, cocoa powder, honey, syrup, jam, and preserves Low-fat, trans-fat free cookies, cakes, and pies Godfrey and animal crackers, fig bars, roderick snaps   High-fat desserts Broth, soups, gravies, and sauces, made from instant mixes or other high-sodium ingredients Salted snack foods Canned olives Meat tenderizers, seasoning salt, and most flavored vinegars   Beverages   Low-sodium carbonated beverages Tea and coffee in moderation Soy milk   Commercially softened water   Suggestions   Make whole grains, fruits, and vegetables the base of your diet. Choose heart-healthy fats such as canola, olive, and flaxseed oil, and foods high in heart-healthy fats, such as nuts, seeds, soybeans, tofu, and fish. Eat fish at least twice per week; the fish highest in omega-3 fatty acids and lowest in mercury include salmon, herring, mackerel, sardines, and canned chunk light tuna. If you eat fish less than twice per week or have high triglycerides, talk to your doctor about taking fish oil supplements. Read food labels. For products low in fat and cholesterol, look for fat free, low-fat, cholesterol free, saturated fat free, and trans fat freeAlso scan the Nutrition Facts Label, which lists saturated fat, trans fat, and cholesterol amounts. For products low in sodium, look for sodium free, very low sodium, low sodium, no added salt, and unsalted   Skip the salt when cooking or at the table; if food needs more flavor, get creative and try out different herbs and spices. Garlic and onion also add substantial flavor to foods. Trim any visible fat off meat and poultry before cooking, and drain the fat off after jones. Use cooking methods that require little or no added fat, such as grilling, boiling, baking, poaching, broiling, roasting, steaming, stir-frying, and sauting. Avoid fast food and convenience food. They tend to be high in saturated and trans fat and have a lot of added salt. Talk to a registered dietitian for individualized diet advice. Last Reviewed: March 2011 Axel Hong MS, MPH, RD   Updated: 3/29/2011       Heart-Healthy Diet   Sodium, Fat, and Cholesterol Controlled Diet       What Is a Heart Healthy Diet? A heart-healthy diet is one that limits sodium , certain types of fat , and cholesterol .  This type of diet is recommended for:   People with any form of cardiovascular disease (eg, coronary heart disease , peripheral vascular disease , previous heart attack , previous stroke )   People with risk factors for cardiovascular disease, such as high blood pressure , high cholesterol , or diabetes   Anyone who wants to lower their risk of developing cardiovascular disease   Sodium    Sodium is a mineral found in many foods. In general, most people consume much more sodium than they need. Diets high in sodium can increase blood pressure and lead to edema (water retention). On a heart-healthy diet, you should consume no more than 2,300 mg (milligrams) of sodium per dayabout the amount in one teaspoon of table salt. The foods highest in sodium include table salt (about 50% sodium), processed foods, convenience foods, and preserved foods. Cholesterol    Cholesterol is a fat-like, waxy substance in your blood. Our bodies make some cholesterol. It is also found in animal products, with the highest amounts in fatty meat, egg yolks, whole milk, cheese, shellfish, and organ meats. On a heart-healthy diet, you should limit your cholesterol intake to less than 200 mg per day. It is normal and important to have some cholesterol in your bloodstream. But too much cholesterol can cause plaque to build up within your arteries, which can eventually lead to a heart attack or stroke. The two types of cholesterol that are most commonly referred to are:   Low-density lipoprotein (LDL) cholesterol  Also known as bad cholesterol, this is the cholesterol that tends to build up along your arteries. Bad cholesterol levels are increased by eating fats that are saturated or hydrogenated. Optimal level of this cholesterol is less than 100. Over 130 starts to get risky for heart disease. High-density lipoprotein (HDL) cholesterol  Also known as good cholesterol, this type of cholesterol actually carries cholesterol away from your arteries and may, therefore, help lower your risk of having a heart attack.  You want this level to be high (ideally greater than 60). It is a risk to have a level less than 40. You can raise this good cholesterol by eating olive oil, canola oil, avocados, or nuts. Exercise raises this level, too. Fat    Fat is calorie dense and packs a lot of calories into a small amount of food. Even though fats should be limited due to their high calorie content, not all fats are bad. In fact, some fats are quite healthful. Fat can be broken down into four main types. The good-for-you fats are:   Monounsaturated fat  found in oils such as olive and canola, avocados, and nuts and natural nut butters; can decrease cholesterol levels, while keeping levels of HDL cholesterol high   Polyunsaturated fat  found in oils such as safflower, sunflower, soybean, corn, and sesame; can decrease total cholesterol and LDL cholesterol   Omega-3 fatty acids  particularly those found in fatty fish (such as salmon, trout, tuna, mackerel, herring, and sardines); can decrease risk of arrhythmias, decrease triglyceride levels, and slightly lower blood pressure   The fats that you want to limit are:   Saturated fat  found in animal products, many fast foods, and a few vegetables; increases total blood cholesterol, including LDL levels   Animal fats that are saturated include: butter, lard, whole-milk dairy products, meat fat, and poultry skin   Vegetable fats that are saturated include: hydrogenated shortening, palm oil, coconut oil, cocoa butter   Hydrogenated or trans fat  found in margarine and vegetable shortening, most shelf stable snack foods, and fried foods; increases LDL and decreases HDL     It is generally recommended that you limit your total fat for the day to less than 30% of your total calories. If you follow an 1800-calorie heart healthy diet, for example, this would mean 60 grams of fat or less per day. Saturated fat and trans fat in your diet raises your blood cholesterol the most, much more than dietary cholesterol does.  For this reason, on a heart-healthy diet, less than 7% of your calories should come from saturated fat and ideally 0% from trans fat. On an 1800-calorie diet, this translates into less than 14 grams of saturated fat per day, leaving 46 grams of fat to come from mono- and polyunsaturated fats.    Food Choices on a Heart Healthy Diet   Food Category   Foods Recommended   Foods to Avoid   Grains   Breads and rolls without salted tops Most dry and cooked cereals Unsalted crackers and breadsticks Low-sodium or homemade breadcrumbs or stuffing All rice and pastas   Breads, rolls, and crackers with salted tops High-fat baked goods (eg, muffins, donuts, pastries) Quick breads, self-rising flour, and biscuit mixes Regular bread crumbs Instant hot cereals Commercially prepared rice, pasta, or stuffing mixes   Vegetables   Most fresh, frozen, and low-sodium canned vegetables Low-sodium and salt-free vegetable juices Canned vegetables if unsalted or rinsed   Regular canned vegetables and juices, including sauerkraut and pickled vegetables Frozen vegetables with sauces Commercially prepared potato and vegetable mixes   Fruits   Most fresh, frozen, and canned fruits All fruit juices   Fruits processed with salt or sodium   Milk   Nonfat or low-fat (1%) milk Nonfat or low-fat yogurt Cottage cheese, low-fat ricotta, cheeses labeled as low-fat and low-sodium   Whole milk Reduced-fat (2%) milk Malted and chocolate milk Full fat yogurt Most cheeses (unless low-fat and low salt) Buttermilk (no more than 1 cup per week)   Meats and Beans   Lean cuts of fresh or frozen beef, veal, lamb, or pork (look for the word loin) Fresh or frozen poultry without the skin Fresh or frozen fish and some shellfish Egg whites and egg substitutes (Limit whole eggs to three per week) Tofu Nuts or seeds (unsalted, dry-roasted), low-sodium peanut butter Dried peas, beans, and lentils   Any smoked, cured, salted, or canned meat, fish, or poultry (including moreno, chipped beef, cold cuts, hot dogs, sausages, sardines, and anchovies) Poultry skins Breaded and/or fried fish or meats Canned peas, beans, and lentils Salted nuts   Fats and Oils   Olive oil and canola oil Low-sodium, low-fat salad dressings and mayonnaise   Butter, margarine, coconut and palm oils, moreno fat   Snacks, Sweets, and Condiments   Low-sodium or unsalted versions of broths, soups, soy sauce, and condiments Pepper, herbs, and spices; vinegar, lemon, or lime juice Low-fat frozen desserts (yogurt, sherbet, fruit bars) Sugar, cocoa powder, honey, syrup, jam, and preserves Low-fat, trans-fat free cookies, cakes, and pies Godfrey and animal crackers, fig bars, roderick snaps   High-fat desserts Broth, soups, gravies, and sauces, made from instant mixes or other high-sodium ingredients Salted snack foods Canned olives Meat tenderizers, seasoning salt, and most flavored vinegars   Beverages   Low-sodium carbonated beverages Tea and coffee in moderation Soy milk   Commercially softened water   Suggestions   Make whole grains, fruits, and vegetables the base of your diet. Choose heart-healthy fats such as canola, olive, and flaxseed oil, and foods high in heart-healthy fats, such as nuts, seeds, soybeans, tofu, and fish. Eat fish at least twice per week; the fish highest in omega-3 fatty acids and lowest in mercury include salmon, herring, mackerel, sardines, and canned chunk light tuna. If you eat fish less than twice per week or have high triglycerides, talk to your doctor about taking fish oil supplements. Read food labels. For products low in fat and cholesterol, look for fat free, low-fat, cholesterol free, saturated fat free, and trans fat freeAlso scan the Nutrition Facts Label, which lists saturated fat, trans fat, and cholesterol amounts.    For products low in sodium, look for sodium free, very low sodium, low sodium, no added salt, and unsalted   Skip the salt when cooking or at the table; if food needs more flavor, get creative and try out different herbs and spices. Garlic and onion also add substantial flavor to foods. Trim any visible fat off meat and poultry before cooking, and drain the fat off after jones. Use cooking methods that require little or no added fat, such as grilling, boiling, baking, poaching, broiling, roasting, steaming, stir-frying, and sauting. Avoid fast food and convenience food. They tend to be high in saturated and trans fat and have a lot of added salt. Talk to a registered dietitian for individualized diet advice. Last Reviewed: March 2011 Lynn Jimenez MS, MPH, RD   Updated: 3/29/2011     Patient information: Weight loss treatments    INTRODUCTION  Obesity is a major international problem, and Americans are among the heaviest people in the world. The percentage of obese people in the [place] has risen steadily. Many people find that although they initially lose weight by dieting, they quickly regain the weight after the diet ends. Because it so hard to keep weight off over time, it is important to have as much information and support as possible before starting a diet. You are most likely to be successful in losing weight and keeping it off when you believe that your body weight can be controlled. STARTING A WEIGHT LOSS PROGRAM  Some people like to talk to their doctor or nurse to get help choosing the best plan, monitoring progress, and getting advice and support along the way. To know what treatment (or combination of treatments) will work best, determine your body mass index (BMI) and waist circumference (measurement). The BMI is calculated from your height and weight.   A person with a BMI between 25 and 29.9 is considered overweight   A person with a BMI of 30 or greater is considered to be obese  A waist circumference greater than 35 inches (88 cm) in women and 40 inches (102 cm) in men increases the risk of obesity-related complications, such as heart disease and diabetes. People who are obese and who have a larger waist size may need more aggressive weight loss treatment than others. Talk to your doctor or nurse for advice. Types of treatment  Based on your measurements and your medical history, your doctor or nurse can determine what combination of weight loss treatments would work best for you. Treatments may include changes in lifestyle, exercise, dieting, and, in some cases, weight loss medicines or weight loss surgery. Weight loss surgery, also called bariatric surgery, is reserved for people with severe obesity who have not responded to other weight loss treatments. SETTING A WEIGHT LOSS GOAL  It is important to set a realistic weight loss goal. Your first goal should be to avoid gaining more weight and staying at your current weight (or within 5 percent). Many people have a \"dream\" weight that is difficult or impossible to achieve. People at high risk of developing diabetes who are able to lose 5 percent of their body weight and maintain this weight will reduce their risk of developing diabetes by about 50 percent and reduce their blood pressure. This is a success. Losing more than 15 percent of your body weight and staying at this weight is an extremely good result, even if you never reach your \"dream\" or \"ideal\" weight. LIFESTYLE CHANGES  Programs that help you to change your lifestyle are usually run by psychologists or other professionals. The goals of lifestyle changes are to help you change your eating habits, become more active, and be more aware of how much you eat and exercise, helping you to make healthier choices. This type of treatment can be broken down into three steps: The triggers that make you want to eat   Eating   What happens after you eat  Triggers to eat  Determining what triggers you to eat involves figuring out what foods you eat and where and when you eat.  To figure out what triggers you to eat, keep a learn how to say \"no\" and continue to say no when urged to eat at parties and social gatherings. Develop strategies for events before you go, such as eating before you go or taking low-calorie snacks and drinks with you. Develop a support system  Having a support system is helpful when losing weight. This is why many commercial groups are successful. Family support is also essential; if your family does not support your efforts to lose weight, this can slow your progress or even keep you from losing weight. Positive thinking  People often have conversations with themselves in their head; these conversations can be positive or negative. If you eat a piece of cake that was not planned, you may respond by thinking, \"Oh, you stupid idiot, you've blown your diet! \" and as a result, you may eat more cake. A positive thought for the same event could be, \"Well, I ate cake when it was not on my plan. Now I should do something to get back on track. \" A positive approach is much more likely to be successful than a negative one. Reduce stress  Although stress is a part of everyday life, it can trigger uncontrolled eating in some people. It is important to find a way to get through these difficult times without eating or by eating low-calorie food, like raw vegetables. It may be helpful to imagine a relaxing place that allows you to temporarily escape from stress. With deep breaths and closed eyes, you can imagine this relaxing place for a few minutes. Self-help programs  Self-help programs like [employer] Watchers®, Overeaters Anonymous®, and Take Off [place] (TOPS)© work for some people. As with all weight loss programs, you are most likely to be successful with these plans if you make long-term changes in how you eat. CHOOSING A DIET  A calorie is a unit of energy found in food. Your body needs calories to function. The goal of any diet is to burn up more calories than you eat.    How quickly you lose weight depends upon several factors, such as your age, gender, and starting weight. Older people have a slower metabolism than young people, so they lose weight more slowly. Men lose more weight than women of similar height and weight when dieting because they use more energy. People who are extremely overweight lose weight more quickly than those who are only mildly overweight. Try not to drink alcohol or drinks with added sugar, and most sweets (candy, cakes, cookies), since they rarely contain important nutrients. Portion-controlled diets  One simple way to diet is to buy packaged foods, like frozen low-calorie meals or meal-replacement canned drinks. A typical meal plan for one day may include:  A meal-replacement drink or breakfast bar for breakfast   A meal-replacement drink or a frozen low-calorie (250 to 350 calories) meal for lunch   A frozen low-calorie meal or other prepackaged, calorie-controlled meal, along with extra vegetables for dinner  This would give you 1000 to 1500 calories per day. Low-fat diet  To reduce the amount of fat in your diet, you can:  Eat low-fat foods. Low-fat foods are those that contain less than 30 percent of calories from fat. Fat is listed on the food facts label (figure 1). Count fat grams. For a 1500 calorie diet, this would mean about 45 g or fewer of fat per day. Low-carbohydrate diet  Low- and very-low-carbohydrate diets (eg, Atkins diet, [employer]) have become popular ways to lose weight quickly. With a very-low-carbohydrate diet, you eat between 0 and 60 grams of carbohydrates per day (a standard diet contains 200 to 300 grams of carbohydrates)   With a low-carbohydrate diet, you eat between 60 and 130 grams of carbohydrates per day  Carbohydrates are found in fruits, vegetables, and grains (including breads, rice, pasta, and cereal), alcoholic beverages, and in dairy products. Meat and fish do not contain carbohydrates.   Side effects of very-low-carbohydrate diets can include constipation, headache, bad breath, muscle cramps, diarrhea, and weakness. Mediterranean diet  The term \"Mediterranean diet\" refers to a way of eating that is common in [place] regions around the [facility]. Although there is some variation in Mediterranean diets, there are some similarities. Most Mediterranean diets include:  A high level of monounsaturated fats (from olive or canola oil, walnuts, pecans, almonds) and a low level of saturated fats (from butter)   A high amount of vegetables, fruits, legumes, and grains (7 to 10 servings of fruits and vegetables per day)   A moderate amount of milk and dairy products, mostly in the form of cheese. Use low-fat dairy products (skim milk, fat-free yogurt, low-fat cheese). A relatively low amount of red meat and meat products. Substitute fish or poultry for red meat. For those who drink alcohol, a modest amount (mainly as red wine) may help to protect against cardiovascular disease. A modest amount is up to one (4 ounce) glass per day for women and up to two glasses per day for men. Which diet is best?  Studies have compared different diets, including:  Very-low-carbohydrate (Atkins)   Macronutrient balance controlling glycemic load (Zone®)   Reduced-calorie (Weight Watchers®)   Very-low-fat (Ornish)  No one diet is \"best\" for weight loss. Any diet will help you to lose weight if you stick with the diet. Therefore, it is important to choose a diet that includes foods you like. Fad diets  Fad diets often promise quick weight loss (more than 1 to 2 pounds per week) and may claim that you do not need to exercise or give up favorite foods. Some fad diets cost a lot of money, because you have to pay for seminars or pills. Fad diets generally lack any scientific evidence that they are safe and effective, but instead rely on \"before\" and \"after\" photos or testimonials.   Diets that sound too good to be true usually are. These plans are a waste of time and money and are not recommended. A doctor, nurse, or nutritionist can help you find a safe and effective way to lose weight and keep it off. WEIGHT LOSS [place] a weight loss medicine may be helpful when used in combination with diet, exercise, and lifestyle changes. However, it is important to understand the risks and benefits of these medicines. It is also important to be realistic about your goal weight using a weight loss medicine; you may not reach your \"dream\" weight, but you may be able to reduce your risk of diabetes or heart disease. Weight loss medicines may be recommended for people who have not been able to lose weight with diet and exercise who have a:  BMI of 30 or more    BMI between 27 and 29.9 and have other medical problems, such as diabetes, high cholesterol, or high blood pressure  Two weight loss medicines are approved in the [place] for long-term use. These are sibutramine and orlistat. Other weight loss medicines (phentermine, diethylpropion) are available but are only approved for short-term use (up to 12 weeks). Sibutramine  Sibutramine (Meridia®, Reductil®) is a medicine that reduces your appetite. In people who take the medicine for one year, the average weight loss is 10 percent of the initial body weight (5 percent more than those who took a placebo treatment). Side effects of sibutramine include insomnia, dry mouth, and constipation. Increases in blood pressure can occur. Therefore, blood pressure is usually monitored during treatment. There is no evidence that sibutramine causes heart or lung problems (like dexfenfluramine and fenfluramine (Phen/Fen)). However, experts agree that sibutramine should not used by people with coronary heart disease, heart failure, uncontrolled hypertension, stroke, irregular heart rhythms, or peripheral vascular disease (poor circulation in the legs).   Orlistat  Orlistat (Xenical® 120 mg including death. There are not enough data about the safety and efficacy of chromium, ginseng, glucomannan, green tea, hydroxycitric acid, L carnitine, psyllium, pyruvate supplements, Ness wort, and conjugated linoleic acid. Two supplements from [facility], [address] [place] (also known as the [address] 15 pill) and Herbathin dietary supplement, have been shown to contain prescription drugs. Hoodia gordonii is a dietary supplement derived from a plant in [place]. It is not recommended because there is no proof that it is safe or effective. Bitter orange (Citrus aurantium) can increase your heart rate and blood pressure and is not recommended. SHOULD I HAVE SURGERY TO LOSE WEIGHT?  Weight loss surgery is recommended ONLY for people with one of the following:  Severe obesity (body mass index above 40) (calculator 1 and calculator 2) who have not responded to diet, exercise, or weight loss medicines   Body mass index between 35 and 40, along with a serious medical problem (including diabetes, severe joint pain, or sleep apnea) that would improve with weight loss  You should be sure that you understand the potential risks and benefits of weight loss surgery. You must be motivated and willing to make lifelong changes in how you eat to reach and maintain a healthier weight after surgery. You must also be realistic about weight loss after surgery (see 'Effectiveness of weight loss surgery' below). PREPARING FOR WEIGHT LOSS SURGERY  Most people who have weight loss surgery will meet with several specialists before surgery is scheduled. This often includes a dietitian, mental health counselor, a doctor who specializes in care of obese people, and a surgeon who performs weight loss surgery (bariatric surgeon). You may need to work with these providers for several weeks or months before surgery. The nutritionist will explain what and how much you will be able to eat after surgery.  You may also need to lose a small amount of weight before surgery. The mental health specialist will help you to cope with stress and other factors that can make it harder to lose weight or trigger you to eat   The medical doctor will determine whether you need other tests, counseling, or treatment before surgery. He or she might also help you begin a medical weight loss program so that you can lose some weight before surgery. The bariatric surgeon will meet with you to discuss the surgeries available to treat obesity. He or she will also make sure you are a good candidate for surgery. TYPES OF WEIGHT LOSS SURGERY  There are several types of weight loss surgeries, the most common being lap banding, gastric bypass, and gastric sleeve. Lap banding  Laparoscopic adjustable gastric banding (LAGB), or lap banding, is a surgery that uses an adjustable band around the opening to the stomach (figure 1). This reduces the amount of food that you can eat at one time. Lap banding is done through small incisions, with a laparoscope. The band can be adjusted after surgery, allowing you to eat more or less food. Adjustments to the size and tightness of the band are made by using a needle to add or remove fluid from a port (a small container under the skin that is connected to the band). Adding fluid to the band makes it tighter which restricts the amount of food you can eat and may help you to lose more weight. Lap banding is a popular choice because it is relatively simple to perform, can be adjusted or removed, and has a low risk of serious complications immediately after surgery. However, weight loss with the lap band depends on your ability to follow the program closely. You will need to prepare nutritious meals that [facility] with\" the band, not against it. For example, the lap band will not work well if you eat or drink a large amount of liquid calories (like ice cream).  The band will not help you to feel full when you eat/drink liquid calories. Weight loss ranges from 45 to 75 percent after two years. As an example, a person who is 120 pounds overweight could expect to lose approximately 54 to 90 pounds in the two years after lap banding. Gastric bypass  Eugenio-en-Y gastric bypass, also called gastric bypass, helps you to lose weight by reducing the amount of food you can eat and reducing the number of calories and nutrients you absorb from the food you eat. To perform gastric bypass, a surgeon creates a small stomach pouch by dividing the stomach and attaching it to the small intestine. This helps you to lose weight in two ways: The smaller stomach can hold less food than before surgery. This causes you to feel full after eating a very small amount of food or liquid. Over time, the pouch might stretch, allowing you to eat more food. The body absorbs fewer calories, since food bypasses most of the stomach as well as the upper small intestine. This new arrangement seems to decrease your appetite and change how you break down foods by changing the release of various hormones. Gastric bypass can be performed as open surgery (through an incision on the abdomen) or laparoscopically, which uses smaller incisions and smaller instruments. Both the laparoscopic and open techniques have risks and benefits. You and your surgeon should work together to decide which surgery, if any, is right for you. Gastric bypass has a high success rate, and people lose an average of 62 to 68 percent of their excess body weight in the first year. Weight loss typically levels off after one to two years, with an overall excess weight loss between 50 and 75 percent. For a person who is 120 pounds overweight, an average of 60 to 90 pounds of weight loss would be expected. Gastric sleeve  Gastric sleeve, also known as sleeve gastrectomy, is a surgery that reduces the size of the stomach and makes it into a narrow tube (figure 3).  The new stomach is much smaller and produces less of the hormone (ghrelin) that causes hunger, helping you feel satisfied with less food. Sleeve gastrectomy is safer than gastric bypass because the intestines are not rearranged, and there is less chance of malnutrition. It also appears to control hunger better than lap banding. It might be safer than the lap banding because no foreign materials are used. The gastric sleeve has a good success rate, and people lose an average of 33 percent of their excess body weight in the first year. For a person who is 120 pounds overweight, this would mean losing about 40 pounds in the first year. WEIGHT LOSS SURGERY COMPLICATIONS  A variety of complications can occur with weight loss surgery. The risks of surgery depend upon which surgery you have and any medical problems you had before surgery. Some of the more common early surgical complications (one to six weeks after surgery) include:  Bleeding   Infection   Blockage or tear in the bowels   Need for further surgery  Important medical complications after surgery can include blood clots in the legs or lungs, heart attack, pneumonia, and urinary tract infection. Complications are less likely when surgery is performed in centers that are experienced in weight loss surgery. In general, centers with experience in weight loss surgery have:  Board-certified doctors and surgeons   A team of support staff (dietitians, counselors, nurses)   Long-term follow-up after surgery   Hospital staff experienced with the care of weight loss patients. This includes nurses who are trained in the care of patients immediately after surgery and anesthesiologists who are experienced in caring for the morbidly obese. EFFECTIVENESS OF WEIGHT LOSS SURGERY  The goal of weight loss surgery is to reduce the risk of illness or death associated with obesity.  Weight loss surgery can also help you to feel and look better, reduce the amount of money you spend on medicines, and cut down on A high-fiber diet should contain  20-35 grams  of fiber a day. This is actually the amount recommended for the general adult population; however, most Americans eat only 15 grams of fiber per day. Digestion of Fiber   Eating a higher fiber diet than usual can take some getting used to by your body's digestive system. To avoid the side effects of sudden increases in dietary fiber (eg, gas, cramping, bloating, and diarrhea), increase fiber gradually and be sure to drink plenty of fluids every day. Tips for Increasing Fiber Intake   Whenever possible, choose whole grains over refined grains (eg, brown rice instead of white rice, whole-wheat bread instead of white bread). Include a variety of grains in your diet, such as wheat, rye, barley, oats, quinoa, and bulgur. Eat more vegetarian-based meals. Here are some ideas: black bean burgers, eggplant lasagna, and veggie tofu stir-virk. Choose high-fiber snacks, such as fruits, popcorn, whole-grain crackers, and nuts. Make whole-grain cereal or whole-grain toast part of your daily breakfast regime. When eating out, whether ordering a sandwich or dinner, ask for extra vegetables. When baking, replace part of the white flour with whole-wheat flour. Whole-wheat flour is particularly easy to incorporate into a recipe. High-Fiber Diet Eating Guide   Food Category   Foods Recommended   Notes   Grains   Whole-grain breads, muffins, bagels, or nithya bread Rye bread Whole-wheat crackers or crisp breads Whole-grain or bran cereals Oatmeal, oat bran, or grits Wheat germ Whole-wheat pasta and brown rice   Read the ingredients list on food labels. Look for products that list \"whole\" as the first ingredient (eg, whole-wheat, whole oats). Choose cereals with at least 2 grams of fiber per serving.    Vegetables   All vegetables, especially asparagus, bean sprouts, broccoli, [place] sprouts, cabbage, carrots, cauliflower, celery, corn, greens, green beans, green pepper, onions, peas, potatoes (with skin), snow peas, spinach, squash, sweet potatoes, tomatoes, zucchini   For maximum fiber intake, eat the peels of fruits and vegetablesjust be sure to wash them well first.   Fruits   All fruits, especially apples, berries, grapefruits, mangoes, nectarines, oranges, peaches, pears, dried fruits (figs, dates, prunes, raisins)   Choose raw fruits and vegetables over juice, cooked, or cannedraw fruit has more fiber. Dried fruit is also a good source of fiber. Milk   With the exception of yogurt containing inulin (a type of fiber), dairy foods provide little fiber. Add more fiber by topping your yogurt or cottage cheese with fresh fruit, whole grain or bran cereals, nuts, or seeds. Meats and Beans   All beans and peas, especially Garbanzo beans, kidney beans, lentils, lima beans, split peas, and mohamud beans All nuts and seeds, especially almonds, peanuts, [place] nuts, cashews, peanut butter, walnuts, sesame and sunflower seeds All meat, poultry, fish, and eggs   Increase fiber in meat dishes by adding mohamud beans, kidney beans, black-eyed peas, bran, or oatmeal. If you are following a low-fat diet, use nuts and seeds only in moderation. Fats and Oils   All in moderation   Fats and oils do not provide fiber   Snacks, Sweets, and Condiments   Fruit Nuts Popcorn, whole-wheat pretzels, or trail mix made with dried fruits, nuts, and seeds Cakes, breads, and cookies made with oatmeal or whole-wheat flour   Most snack foods do not provide much fiber. Choose snacks with at least 2 grams of fiber per serving. Last Reviewed: March 2011 Ivon Snow MS, MPH, RD   Updated: 3/29/2011       Keep Your Memory [facility]       Many factors can affect your ability to remembera hectic lifestyle, aging, stress, chronic disease, and certain medicines. But, there are steps you can take to sharpen your mind and help preserve your memory.    Challenge Your Brain   Regularly challenging your mind may help keeps it in top shape. Good mental exercises include:   Crossword puzzlesUse a dictionary if you need it; you will learn more that way. Brainteasers Try some! Crafts, such as wood working and sewing   Hobbies, such as gardening and building model airplanes   SocializingVisit old friends or join groups to meet new ones. Reading   Learning a new language   Taking a class, whether it be art history or massimo chi   TravelingExperience the food, history, and culture of your destination   Learning to use a computer   Going to museums, the theater, or thought-provoking movies   Changing things in your daily life, such as reversing your pattern in the grocery store or brushing your teeth using your nondominant hand   Use Memory Aids   There is no need to remember every detail on your own. These memory aids can help:   Calendars and day planners   Electronic organizers to store all sorts of helpful informationThese devices can \"beep\" to remind you of appointments. A book of days to record birthdays, anniversaries, and other occasions that occur on the same date every year   Detailed \"to-do\" lists and strategically placed sticky notes   Quick \"study\" sessionsBefore a gathering, review who will be there so their names will be fresh in your mind. Establish routinesFor example, keep your keys, wallet, and umbrella in the same place all the time or take medicine with your 8:00 AM glass of juice   Live a Healthy Life   Many actions that will keep your body strong will do the same for your mind. For example:   Talk to Your Doctor About Herbs and Supplements    Malnutrition and vitamin deficiencies can impair your mental function. For example, vitamin B12 deficiency can cause a range of symptoms, including confusion. But, what if your nutritional needs are being met? Can herbs and supplements still offer a benefit?  Researchers have investigated a range of natural remedies, such as ginkgo , ginseng , and the supplement phosphatidylserine (PS). So far, though, the evidence is inconsistent as to whether these products can improve memory or thinking. If you are interested in taking herbs and supplements, talk to your doctor first because they may interact with other medicines that you are taking. Exercise Regularly    Among the many benefits of regular exercise are increased blood flow to the brain and decreased risk of certain diseases that can interfere with memory function. One study found that even moderate exercise has a beneficial effect. Examples of \"moderate\" exercise include:   Playing 18 holes of golf once a week, without a cart   Playing tennis twice a week   Walking one mile per day   Manage Stress    It can be tough to remember what is important when your mind is cluttered. Make time for relaxation. Choose activities that calm you down, and make it routine. Manage Chronic Conditions    Side effects of high blood pressure , diabetes, and heart disease can interfere with mental function. Many of the lifestyle steps discussed here can help manage these conditions. Strive to eat a healthy diet, exercise regularly, get stress under control, and follow your doctor's advice for your condition. Minimize Medications    Talk to your doctor about the medicines that you take. Some may be unnecessary. Also, healthy lifestyle habits may lower the need for certain drugs. Last Reviewed: April 2010 Cindy Garrido MD   Updated: 4/13/2010     Smoking Cessation  This document explains the best ways for you to quit smoking and new treatments to help. It lists new medicines that can double or triple your chances of quitting and quitting for good. It also considers ways to avoid relapses and concerns you may have about quitting, including weight gain. NICOTINE: A POWERFUL ADDICTION  If you have tried to quit smoking, you know how hard it can be. It is hard because nicotine is a very addictive drug.  For some people, it can be as addictive as heroin or cocaine. Usually, people make 2 or 3 tries, or more, before finally being able to quit. Each time you try to quit, you can learn about what helps and what hurts. Quitting takes hard work and a lot of effort, but you can quit smoking. QUITTING SMOKING IS ONE OF THE MOST IMPORTANT THINGS YOU WILL EVER DO. You will live longer, feel better, and live better. The impact on your body of quitting smoking is felt almost immediately:  Within 20 minutes, blood pressure decreases. Pulse returns to its normal level. After 8 hours, carbon monoxide levels in the blood return to normal. Oxygen level increases. After 24 hours, chance of heart attack starts to decrease. Breath, hair, and body stop smelling like smoke. After 48 hours, damaged nerve endings begin to recover. Sense of taste and smell improve. After 72 hours, the body is virtually free of nicotine. Bronchial tubes relax and breathing becomes easier. After 2 to 12 weeks, lungs can hold more air. Exercise becomes easier and circulation improves. Quitting will reduce your risk of having a heart attack, stroke, cancer, or lung disease:  After 1 year, the risk of coronary heart disease is cut in half. After 5 years, the risk of stroke falls to the same as a nonsmoker. After 10 years, the risk of lung cancer is cut in half and the risk of other cancers decreases significantly. After 15 years, the risk of coronary heart disease drops, usually to the level of a nonsmoker. If you are pregnant, quitting smoking will improve your chances of having a healthy baby. The people you live with, especially your children, will be healthier. You will have extra money to spend on things other than cigarettes. FIVE KEYS TO QUITTING  Studies have shown that these 5 steps will help you quit smoking and quit for good. You have the best chances of quitting if you use them together:  Get ready. Get support and encouragement.   Learn new skills and behaviors. Get medicine to reduce your nicotine addiction and use it correctly. Be prepared for relapse or difficult situations. Be determined to continue trying to quit, even if you do not succeed at first.  1. GET READY  Set a quit date. Change your environment. Get rid of ALL cigarettes, ashtrays, matches, and lighters in your home, car, and place of work. Do not let people smoke in your home. Review your past attempts to quit. Think about what worked and what did not. Once you quit, do not smoke. NOT EVEN A PUFF! 2. GET SUPPORT AND ENCOURAGEMENT  Studies have shown that you have a better chance of being successful if you have help. You can get support in many ways. Tell your family, friends, and coworkers that you are going to quit and need their support. Ask them not to smoke around you. Talk to your caregivers (doctor, dentist, nurse, pharmacist, psychologist, and/or smoking counselor). Get individual, group, or telephone counseling and support. The more counseling you have, the better your chances are of quitting. Programs are available at [facility]. Call your local health department for information about programs in your area. Spiritual beliefs and practices may help some smokers quit. Quit meters are small computer programs online or downloadable that keep track of quit statistics, such as amount of \"quit-time,\" cigarettes not smoked, and money saved. Many smokers find one or more of the many self-help books available useful in helping them quit and stay off tobacco.  3. LEARN NEW SKILLS AND BEHAVIORS  Try to distract yourself from urges to smoke. Talk to someone, go for a walk, or occupy your time with a task. When you first try to quit, change your routine. Take a different route to work. Drink tea instead of coffee. Eat breakfast in a different place. Do something to reduce your stress. Take a hot bath, exercise, or read a book.   Plan something enjoyable to do every day. Reward yourself for not smoking. Explore interactive web-based programs that specialize in helping you quit. 4. GET MEDICINE AND USE IT CORRECTLY  Medicines can help you stop smoking and decrease the urge to smoke. Combining medicine with the above behavioral methods and support can quadruple your chances of successfully quitting smoking. The [place] Food and Drug Administration (FDA) has approved 7 medicines to help you quit smoking. These medicines fall into 3 categories. Nicotine replacement therapy (delivers nicotine to your body without the negative effects and risks of smoking):  Nicotine gum: Available over-the-counter. Nicotine lozenges: Available over-the-counter. Nicotine inhaler: Available by prescription. Nicotine nasal spray: Available by prescription. Nicotine skin patches (transdermal): Available by prescription and over-the-counter. Antidepressant medicine (helps people abstain from smoking, but how this works is unknown): Bupropion sustained-release (SR) tablets: Available by prescription. Nicotinic receptor partial agonist (simulates the effect of nicotine in your brain):  Varenicline tartrate tablets: Available by prescription. Ask your caregiver for advice about which medicines to use and how to use them. Carefully read the information on the package. Everyone who is trying to quit may benefit from using a medicine. If you are pregnant or trying to become pregnant, nursing an infant, you are under age 25, or you smoke fewer than 10 cigarettes per day, talk to your caregiver before taking any nicotine replacement medicines. You should stop using a nicotine replacement product and call your caregiver if you experience nausea, dizziness, weakness, vomiting, fast or irregular heartbeat, mouth problems with the lozenge or gum, or redness or swelling of the skin around the patch that does not go away.   Do not use any other product containing nicotine while using a nicotine replacement product. Talk to your caregiver before using these products if you have diabetes, heart disease, asthma, stomach ulcers, you had a recent heart attack, you have high blood pressure that is not controlled with medicine, a history of irregular heartbeat, or you have been prescribed medicine to help you quit smoking. 5. BE PREPARED FOR RELAPSE OR DIFFICULT SITUATIONS  Most relapses occur within the first 3 months after quitting. Do not be discouraged if you start smoking again. Remember, most people try several times before they finally quit. You may have symptoms of withdrawal because your body is used to nicotine. You may crave cigarettes, be irritable, feel very hungry, cough often, get headaches, or have difficulty concentrating. The withdrawal symptoms are only temporary. They are strongest when you first quit, but they will go away within 10 to 14 days. Here are some difficult situations to watch for:  Alcohol. Avoid drinking alcohol. Drinking lowers your chances of successfully quitting. Caffeine. Try to reduce the amount of caffeine you consume. It also lowers your chances of successfully quitting. Other smokers. Being around smoking can make you want to smoke. Avoid smokers. Weight gain. Many smokers will gain weight when they quit, usually less than 10 pounds. Eat a healthy diet and stay active. Do not let weight gain distract you from your main goal, quitting smoking. Some medicines that help you quit smoking may also help delay weight gain. You can always lose the weight gained after you quit. Bad mood or depression. There are a lot of ways to improve your mood other than smoking. If you are having problems with any of these situations, talk to your caregiver. SPECIAL SITUATIONS AND CONDITIONS  Studies suggest that everyone can quit smoking. Your situation or condition can give you a special reason to quit.   Pregnant women/new mothers: By quitting, you protect your baby's health and your own. Hospitalized patients: By quitting, you reduce health problems and help healing. Heart attack patients: By quitting, you reduce your risk of a second heart attack. Lung, head, and neck cancer patients: By quitting, you reduce your chance of a second cancer. Parents of children and adolescents: By quitting, you protect your children from illnesses caused by secondhand smoke. QUESTIONS TO THINK ABOUT  Think about the following questions before you try to stop smoking. You may want to talk about your answers with your caregiver. Why do you want to quit? If you tried to quit in the past, what helped and what did not? What will be the most difficult situations for you after you quit? How will you plan to handle them? Who can help you through the tough times? Your family? Friends? Caregiver? What pleasures do you get from smoking? What ways can you still get pleasure if you quit? Here are some questions to ask your caregiver: How can you help me to be successful at quitting? What medicine do you think would be best for me and how should I take it? What should I do if I need more help? What is smoking withdrawal like? How can I get information on withdrawal?  Quitting takes hard work and a lot of effort, but you can quit smoking. FOR MORE INFORMATION   Smokefree. gov (PortableGrid.se) provides free, accurate, evidence-based information and professional assistance to help support the immediate and long-term needs of people trying to quit smoking. Document Released: 12/12/2002 Document Revised: 12/06/2012 Document Reviewed: 10/04/2010  [facility] Patient Information ©2012 Domingo Gallardo. Keeping Home a [facility]       As we get older, changes in balance, gait, strength, vision, hearing, and cognition make even the most youthful senior more prone to accidents. Falls are one of the leading health risks for older people.  This increased risk of falling is related to:   Aging process (eg, decreased muscle strength, slowed reflexes)   Higher incidence of chronic health problems (eg, arthritis, diabetes) that may limit mobility, agility or sensory awareness   Side effects of medicine (eg, dizziness, blurred vision)especially medicines like prescription pain medicines and drugs used to treat mental health conditions   Depending on the brittleness of your bones, the consequences of a fall can be serious and long lasting. Home Life   Research by the Association of Aging [facility]) shows that some home accidents among older adults can be prevented by making simple lifestyle changes and basic modifications and repairs to the home environment. Here are some lifestyle changes that experts recommend:   Have your hearing and vision checked regularly. Be sure to wear prescription glasses that are right for you. Speak to your doctor or pharmacist about the possible side effects of your medicines. A number of medicines can cause dizziness. If you have problems with sleep, talk to your doctor. Limit your intake of alcohol. If necessary, use a cane or walker to help maintain your balance. Wear supportive, rubber-soled shoes, even at home. If you live in a region that gets wintry weather, you may want to put special cleats on your shoes to prevent you from slipping on the snow and ice. Exercise regularly to help maintain muscle tone, agility, and balance. Always hold the banister when going up or down stairs. Also, use  bars when getting in or out of the bath or shower, or using the toilet. To avoid dizziness, get up slowly from a lying down position. Sit up first, dangling your legs for a minute or two before rising to a standing position. Overall Home Safety Check   According to the Consumer Product Safety Commision's \"Older Consumer Home Safety Checklist,\" it is important to check for potential hazards in each room. And remember, proper lighting is an essential factor in home safety.  If you cannot see clearly, you are more likely to fall. Important questions to ask yourself include:   Are lamp, electric, extension, and telephone cords placed out of the flow of traffic and maintained in good condition? Have frayed cords been replaced? Are all small rugs and runners slip resistant? If not, you can secure them to the floor with a special double-sided carpet tape. Are smoke detectors properly locatedone on every floor of your home and one outside of every sleeping area? Are they in good working order? Are batteries replaced at least once a year? Do you have a well-maintained carbon monoxide detector outside every sleeping are in your home? Does your furniture layout leave plenty of space to maneuver between and around chairs, tables, beds, and sofas? Are hallways, stairs and passages between rooms well lit? Can you reach a lamp without getting out of bed? Are floor surfaces well maintained? Shag rugs, high-pile carpeting, tile floors, and polished wood floors can be particularly slippery. Stairs should always have handrails and be carpeted or fitted with a non-skid tread. Is your telephone easily reachable. Is the cord safely tucked away? Room by Room   According to the Association of Aging, bathrooms and doris are the two most potentially hazardous rooms in your home. In the Kitchen    Be sure your stove is in proper working order and always make sure burners and the oven are off before you go out or go to sleep. Keep pots on the back burners, turn handles away from the front of the stove, and keep stove clean and free of grease build-up. Kitchen ventilation systems and range exhausts should be working properly. Keep flammable objects such as towels and pot holders away from the cooking area except when in use. Make sure kitchen curtains are tied back. Move cords and appliances away from the sink and hot surfaces.  If extension cords are needed, install wiring guides so S. A.F.E. (Smoke Alarms for Every) [facility], senior citizens are one of the two highest risk groups for death and serious injuries due to residential fires. When cooking, wear short-sleeved items, never a bulky long-sleeved robe. The [facility]'s Safety Checklist for Older Consumers emphasizes the importance of checking basements, garages, workshops and storage areas for fire hazards, such as volatile liquids, piles of old rags or clothing and overloaded circuits. Never smoke in bed or when lying down on a couch or recliner chair. Small portable electric or kerosene heaters are responsible for many home fires and should be used cautiously if at all. If you do use one, be sure to keep them away from flammable materials. In case of fire, make sure you have a pre-established emergency exit plan. Have a professional check your fireplace and other fuel-burning appliances yearly. Helping Hands   Baby boomers entering the wallace years will continue to see the development of new products to help older adults live safely and independently in spite of age-related changes. Making Life More Livable  , by [employer], lists over 1,000 products for \"living well in the mature years,\" such as bathing and mobility aids, household security devices, ergonomically designed knives and peelers, and faucet valves and knobs for temperature control. Medical supply stores and organizations are good sources of information about products that improve your quality of life and insure your safety. Last Reviewed: November 2009 Jose Prajapati MD   Updated: 3/7/2011            Advance Care Planning: Care Instructions  Your Care Instructions     It can be hard to live with an illness that cannot be cured. But if your health is getting worse, you may want to make decisions about end-of-life care. Planning for the end of your life does not mean that you are giving up. It is a way to make sure that your wishes are met. Clearly stating your wishes can make it easier for your loved ones. Making plans while you are still able may also ease your mind and make your final days less stressful and more meaningful. Follow-up care is a key part of your treatment and safety. Be sure to make and go to all appointments, and call your doctor if you are having problems. It's also a good idea to know your test results and keep a list of the medicines you take. What can you do to plan for the end of life? You can bring these issues up with your doctor. You do not need to wait until your doctor starts the conversation. You might start with, \"What makes life worth living for me is. Erik Metcalf \" And then follow it with, \"I would not be willing to live with . Erik Metcalf \" When you complete this sentence it helps your doctor understand your wishes. Talk openly and honestly with your doctor. This is the best way to understand the decisions you will need to make as your health changes. Know that you can always change your mind. Ask your doctor about commonly used life-support measures. These include tube feedings, breathing machines, and fluids given through a vein (IV). Understanding these treatments will help you decide whether you want them. You may choose to have these life-supporting treatments for a limited time. This allows a trial period to see whether they will help you. You may also decide that you want your doctor to take only certain measures to keep you alive. It may help to think about the big picture, like what makes life worth living for you or what your values and goals are. Talk to your doctor about how long you are likely to live. Your doctor may be able to give you an idea of what usually happens with your specific illness. Think about preparing papers that state your wishes. These papers are called advance directives. If you do this early and review them often, there will not be any confusion about what you want.  You can change your instructions at any time. Which papers should you prepare? Advance directives are legal papers that tell doctors how you want to be cared for at the end of your life. You do not need a  to write these papers. Ask your doctor or your state [facility] department for information on how to write your advance directives. They may have the forms for each of these types of papers. Make sure your doctor has a copy of these on file, and give a copy to a family member or close friend. Consider a do-not-resuscitate order (DNR). This order asks that no extra treatments be done if your heart stops or you stop breathing. Extra treatments may include cardiopulmonary resuscitation (CPR), electrical shock to restart your heart, or a machine to breathe for you. If you decide to have a DNR order, ask your doctor to explain and write it. Place the order in your home where everyone can easily see it. Consider a living will. A living will explains your wishes about life support and other treatments at the end of your life if you become unable to speak for yourself. Living montanez tell doctors to use or not use treatments that would keep you alive. You must have one or two witnesses or a notary present when you sign this form. A living will may be called something else in your state. Consider a medical power of . This form allows you to name a person to make decisions about your care if you are not able to. Most people ask a close friend or family member. Talk to this person about the kinds of treatments you want and those that you do not want. Make sure this person understands your wishes. A medical power of  may be called something else in your state. These legal papers are simple to change. Tell your doctor what you want to change, and ask him or her to make a note in your medical file. Give your family updated copies of the papers. Where can you learn more? Go to https://jacques.Gimahhot. org and sign cases. For example, you may need to clearly state in your living will that you don't want artificial hydration and nutrition, such as being fed through a tube. Is a living will a legal document? A living will is a legal document. Each state has its own laws about living montanez. And a living will may be called something else in your state. Here are some things to know about living montanez. You don't need an  to complete a living will. But legal advice can be helpful if your state's laws are unclear. It can also help if your health history is complicated or your family can't agree on what should be in your living will. You can change your living will at any time. Some people find that their wishes about end-of-life care change as their health changes. If you make big changes to your living will, complete a new form. If you move to another state, make sure that your living will is legal in the state where you now live. In most cases, doctors will respect your wishes even if you have a form from a different state. You might use a universal form that has been approved by many states. This kind of form can sometimes be filled out and stored online. Your digital copy will then be available wherever you have a connection to the internet. The doctors and nurses who need to treat you can find it right away. Your state may offer an online registry. This is another place where you can store your living will online. It's a good idea to get your living will notarized. This means using a person called a  to watch two people sign, or witness, your living will. What should you know when you create a living will? Here are some questions to ask yourself as you make your living will:  Do you know enough about life support methods that might be used? If not, talk to your doctor so you know what might be done if you can't breathe on your own, your heart stops, or you can't swallow.   What things would you still want to be able to do after you receive life-support methods? Would you want to be able to walk? To speak? To eat on your own? To live without the help of machines? Do you want certain Christianity practices performed if you become very ill? If you have a choice, where do you want to be cared for? In your home? At a hospital or nursing home? If you have a choice at the end of your life, where would you prefer to die? At home? In a hospital or nursing home? Somewhere else? Would you prefer to be buried or cremated? Do you want your organs to be donated after you die? What should you do with your living will? Make sure that your family members and your health care agent have copies of your living will (also called a declaration). Give your doctor a copy of your living will. Ask him or her to keep it as part of your medical record. If you have more than one doctor, make sure that each one has a copy. Put a copy of your living will where it can be easily found. For example, some people may put a copy on their refrigerator door. If you are using a digital copy, be sure your doctor, family members, and health care agent know how to find and access it. Where can you learn more? Go to https://Immco Diagnosticspepiceweb.Music Dealers. org and sign in to your [employer] account. Enter Y670 in the Diavibe box to learn more about \"Learning About Living Cassie Marrow. \"     If you do not have an account, please click on the \"Sign Up Now\" link. Current as of: March 17, 2021               Content Version: 13.0  © 1155-5485 Healthwise, Incorporated. Care instructions adapted under license by [facility] ([facility]). If you have questions about a medical condition or this instruction, always ask your healthcare professional. [place] [address] any warranty or liability for your use of this information. Learning About Medical Power of   What is a medical power of ?      A medical power of , also called a durable power of  for health care, is one type of the legal forms called advance directives. It lets you name the person you want to make treatment decisions for you if you can't speak or decide for yourself. The person you choose is called your health care agent. This person is also called a health care proxy or health care surrogate. A medical power of  may be called something else in your state. How do you choose a health care agent? Choose your health care agent carefully. This person may or may not be a family member. Talk to the person before you make your final decision. Make sure he or she is comfortable with this responsibility. It's a good idea to choose someone who:  Is at least 25years old. Knows you well and understands what makes life meaningful for you. Understands your Buddhist and moral values. Will do what you want, not what he or she wants. Will be able to make difficult choices at a stressful time. Will be able to refuse or stop treatment, if that is what you would want, even if you could die. Will be firm and confident with health professionals if needed. Will ask questions to get needed information. Lives near you or agrees to travel to you if needed. Your family may help you make medical decisions while you can still be part of that process. But it's important to choose one person to be your health care agent in case you aren't able to make decisions for yourself. If you don't fill out the legal form and name a health care agent, the decisions your family can make may be limited. A health care agent may be called something else in your state. Who will make decisions for you if you don't have a health care agent? If you don't have a health care agent or a living will, you may not get the care you want. Decisions may be made by family members who disagree about your medical care.  Or decisions may be made by a medical professional who doesn't know you well. In some cases, a  makes the decisions. When you name a health care agent, it is very clear who has the power to make health decisions for you. How do you name a health care agent? You name your health care agent on a legal form. This form is usually called a medical power of . Ask your hospital, state bar association, or office on aging where to find these forms. You must sign the form to make it legal. Some states require you to get the form notarized. This means that a person called a  watches you sign the form and then he or she signs the form. Some states also require that two or more witnesses sign the form. Be sure to tell your family members and doctors who your health care agent is. Where can you learn more? Go to https://Azuki (Vozero/Gengibre)pefernandaeweb.Ebid.co.zw. org and sign in to your [employer] account. Enter [id number] in the ProFundCom box to learn more about \"Learning About [address]. \"     If you do not have an account, please click on the \"Sign Up Now\" link. Current as of: March 17, 2021               Content Version: 13.0  © 2006-2021 Healthwise, Incorporated. Care instructions adapted under license by [facility] ([facility]). If you have questions about a medical condition or this instruction, always ask your healthcare professional. [place] [address] any warranty or liability for your use of this information.

## 2021-09-28 LAB
ESTIMATED AVERAGE GLUCOSE: 168.6 MG/DL
HBA1C MFR BLD: 7.5 %

## 2021-10-01 ENCOUNTER — TELEPHONE (OUTPATIENT)
Dept: INTERNAL MEDICINE CLINIC | Age: 76
End: 2021-10-01

## 2021-10-01 NOTE — TELEPHONE ENCOUNTER
Patient wants a handicap placard. She has never had one and wants to know if you are able to give her one.

## 2021-12-02 ENCOUNTER — HOSPITAL ENCOUNTER (OUTPATIENT)
Age: 76
Discharge: HOME | End: 2021-12-02
Payer: MEDICARE

## 2021-12-02 DIAGNOSIS — N18.32 CKD STAGE G3B/A1, GFR 30-44 AND ALBUMIN CREATININE RATIO <30 MG/G (HCC): ICD-10-CM

## 2021-12-02 LAB
ALBUMIN SERPL-MCNC: 4 GM/DL (ref 3.4–5)
ALP BLD-CCNC: 86 IU/L (ref 40–128)
ALT SERPL-CCNC: 15 U/L (ref 10–40)
ANION GAP SERPL CALCULATED.3IONS-SCNC: 14 MMOL/L (ref 4–16)
AST SERPL-CCNC: 13 IU/L (ref 15–37)
BILIRUB SERPL-MCNC: 0.3 MG/DL (ref 0–1)
BUN BLDV-MCNC: 47 MG/DL (ref 6–23)
CALCIUM SERPL-MCNC: 8.9 MG/DL (ref 8.3–10.6)
CHLORIDE BLD-SCNC: 104 MMOL/L (ref 99–110)
CO2: 23 MMOL/L (ref 21–32)
CREAT SERPL-MCNC: 1.9 MG/DL (ref 0.6–1.1)
GFR AFRICAN AMERICAN: 31 ML/MIN/1.73M2
GFR NON-AFRICAN AMERICAN: 26 ML/MIN/1.73M2
GLUCOSE BLD-MCNC: 158 MG/DL (ref 70–99)
POTASSIUM SERPL-SCNC: 4.6 MMOL/L (ref 3.5–5.1)
SODIUM BLD-SCNC: 141 MMOL/L (ref 135–145)
TOTAL PROTEIN: 6.1 GM/DL (ref 6.4–8.2)

## 2021-12-02 PROCEDURE — 80053 COMPREHEN METABOLIC PANEL: CPT

## 2021-12-02 PROCEDURE — 36415 COLL VENOUS BLD VENIPUNCTURE: CPT

## 2021-12-29 ENCOUNTER — OFFICE VISIT (OUTPATIENT)
Dept: INTERNAL MEDICINE CLINIC | Age: 76
End: 2021-12-29
Payer: MEDICARE

## 2021-12-29 VITALS
SYSTOLIC BLOOD PRESSURE: 134 MMHG | DIASTOLIC BLOOD PRESSURE: 76 MMHG | OXYGEN SATURATION: 90 % | BODY MASS INDEX: 33.45 KG/M2 | HEART RATE: 73 BPM | TEMPERATURE: 96.8 F | WEIGHT: 201 LBS

## 2021-12-29 DIAGNOSIS — F32.A CHRONIC DEPRESSION: ICD-10-CM

## 2021-12-29 DIAGNOSIS — E03.4 HYPOTHYROIDISM DUE TO ACQUIRED ATROPHY OF THYROID: ICD-10-CM

## 2021-12-29 DIAGNOSIS — N18.4 TYPE 2 DIABETES MELLITUS WITH STAGE 4 CHRONIC KIDNEY DISEASE, WITHOUT LONG-TERM CURRENT USE OF INSULIN (HCC): Primary | ICD-10-CM

## 2021-12-29 DIAGNOSIS — Z23 NEED FOR INFLUENZA VACCINATION: ICD-10-CM

## 2021-12-29 DIAGNOSIS — I10 ESSENTIAL HYPERTENSION: ICD-10-CM

## 2021-12-29 DIAGNOSIS — E11.22 TYPE 2 DIABETES MELLITUS WITH STAGE 4 CHRONIC KIDNEY DISEASE, WITHOUT LONG-TERM CURRENT USE OF INSULIN (HCC): Primary | ICD-10-CM

## 2021-12-29 DIAGNOSIS — M17.0 ARTHRITIS OF BOTH KNEES: ICD-10-CM

## 2021-12-29 DIAGNOSIS — Z72.0 TOBACCO ABUSE: ICD-10-CM

## 2021-12-29 DIAGNOSIS — F41.9 ANXIETY DISORDER, UNSPECIFIED TYPE: ICD-10-CM

## 2021-12-29 DIAGNOSIS — E78.2 MIXED HYPERLIPIDEMIA: ICD-10-CM

## 2021-12-29 DIAGNOSIS — N18.4 STAGE 4 CHRONIC KIDNEY DISEASE (HCC): ICD-10-CM

## 2021-12-29 LAB
CHP ED QC CHECK: NORMAL
GLUCOSE BLD-MCNC: 129 MG/DL

## 2021-12-29 PROCEDURE — 4040F PNEUMOC VAC/ADMIN/RCVD: CPT | Performed by: INTERNAL MEDICINE

## 2021-12-29 PROCEDURE — G8400 PT W/DXA NO RESULTS DOC: HCPCS | Performed by: INTERNAL MEDICINE

## 2021-12-29 PROCEDURE — 90694 VACC AIIV4 NO PRSRV 0.5ML IM: CPT | Performed by: INTERNAL MEDICINE

## 2021-12-29 PROCEDURE — 1090F PRES/ABSN URINE INCON ASSESS: CPT | Performed by: INTERNAL MEDICINE

## 2021-12-29 PROCEDURE — G0008 ADMIN INFLUENZA VIRUS VAC: HCPCS | Performed by: INTERNAL MEDICINE

## 2021-12-29 PROCEDURE — G8484 FLU IMMUNIZE NO ADMIN: HCPCS | Performed by: INTERNAL MEDICINE

## 2021-12-29 PROCEDURE — G8417 CALC BMI ABV UP PARAM F/U: HCPCS | Performed by: INTERNAL MEDICINE

## 2021-12-29 PROCEDURE — 1123F ACP DISCUSS/DSCN MKR DOCD: CPT | Performed by: INTERNAL MEDICINE

## 2021-12-29 PROCEDURE — 3051F HG A1C>EQUAL 7.0%<8.0%: CPT | Performed by: INTERNAL MEDICINE

## 2021-12-29 PROCEDURE — 99214 OFFICE O/P EST MOD 30 MIN: CPT | Performed by: INTERNAL MEDICINE

## 2021-12-29 PROCEDURE — G8427 DOCREV CUR MEDS BY ELIG CLIN: HCPCS | Performed by: INTERNAL MEDICINE

## 2021-12-29 PROCEDURE — 4004F PT TOBACCO SCREEN RCVD TLK: CPT | Performed by: INTERNAL MEDICINE

## 2021-12-29 PROCEDURE — 82962 GLUCOSE BLOOD TEST: CPT | Performed by: INTERNAL MEDICINE

## 2021-12-29 RX ORDER — LEVOTHYROXINE SODIUM 88 UG/1
88 TABLET ORAL DAILY
Qty: 30 TABLET | Refills: 3 | Status: SHIPPED | OUTPATIENT
Start: 2021-12-29 | End: 2022-06-17 | Stop reason: SDUPTHER

## 2021-12-29 RX ORDER — AMLODIPINE BESYLATE 10 MG/1
10 TABLET ORAL DAILY
Qty: 30 TABLET | Refills: 3 | Status: SHIPPED | OUTPATIENT
Start: 2021-12-29 | End: 2022-05-13 | Stop reason: SDUPTHER

## 2021-12-29 RX ORDER — LISINOPRIL 20 MG/1
20 TABLET ORAL 2 TIMES DAILY
Qty: 60 TABLET | Refills: 3 | Status: ON HOLD
Start: 2021-12-29 | End: 2022-06-10 | Stop reason: HOSPADM

## 2021-12-29 RX ORDER — CHLORTHALIDONE 25 MG/1
TABLET ORAL
Qty: 30 TABLET | Refills: 3 | Status: SHIPPED | OUTPATIENT
Start: 2021-12-29 | End: 2022-05-13 | Stop reason: SDUPTHER

## 2021-12-29 NOTE — PROGRESS NOTES
Vaccine Information Sheet, \"Influenza - Inactivated\"  given to Sharif Butts, or parent/legal guardian of  Sharif Butts and verbalized understanding. Patient responses:    Have you ever had a reaction to a flu vaccine? No  Are you able to eat eggs without adverse effects? Yes  Do you have any current illness? No  Have you ever had Guillian Tippo Syndrome? No    Flu vaccine given per order. Please see immunization tab.

## 2021-12-29 NOTE — PROGRESS NOTES
Name: Kari Agee  [id number]  Age: 68 y.o. YOB: 1945  Sex: female    CHIEF COMPLAINT:    Chief Complaint   Patient presents with    Diabetes    Leg Pain     both legs       HISTORY OF PRESENT ILLNESS:     This is a pleasant  68 y.o. female  is seen today for management of chronic medical problems and medications refills. Previous records reviewed . Doing OK . Denies CP or SOB. No fever , sore throat or cough or congestion. Still smokes @ 1 1/2 PPD. Not willing to quit at this time but will try to slow down on smoking. Denies any abdominal pain. Appetite OK. Bowels moving [id number] [address] No urinary symptoms. Still with pain in her knees but better. She wants a handicap car permit renewed. Hearing is ok. Vision Ok with glasses. Denies  any significant skin lesions. Depression and anxiety better with medications. Seeing Dr. Napoleon Carrington periodically for her psych problems. Does not check her sugars at home. No other complaints. She is fully vaccinated for Covid 19. She did see her nephrologist and he recommended to add SGLT both for her diabetes and for chronic kidney disease. She did not go for mammogram and bone density yet. She is fully vaccinated for COVID-19. But has not taken booster dose yet. She will get it soon. She wants a flu shot given. Past Medical History:    Patient Active Problem List   Diagnosis    Stage 4 chronic [facility] disease [facility]    DM (diabetes mellitus) [facility] [facility]    HTN (hypertension)    Hyperlipidemia    Hypothyroidism    Anxiety disorder    Tobacco abuse    Chronic depression    RLS (restless legs syndrome)    Arthritis of both knees        Past Surgical History:    History reviewed. No pertinent surgical history.     Social History:   Social History     Tobacco Use    Smoking status: Current Every Day Smoker     Packs/day: 1.00     Years: 59.00     Pack years: 59.00     Types: Cigarettes     Start date: 1962    Smokeless tobacco: Never Used   Substance 9/28/20  Yes Marzella Castleman, MD   LORazepam (ATIVAN) 1 MG tablet Take 1 mg by mouth every 8 hours as needed for Anxiety. Yes Historical Provider, MD   sertraline (ZOLOFT) 25 MG tablet Take 200 mg by mouth daily    Yes Historical Provider, MD       LAB DATA: Reviewed. REVIEW OF SYSTEMS:   see HPI/ Comprehensive review of systems negative except for the ones mentioned in HPI. PHYSICAL EXAMINATION:   /76   Pulse 73   Temp 96.8 °F (36 °C)   Wt 201 lb (91.2 kg)   SpO2 90%   BMI 33.45 kg/m²      GENERAL APPEARANCE:    Alert, oriented x 3, well developed, cooperative, not in any distress, appears stated age. HEAD:   Normocephalic, atraumatic   EYES:   PERRLA, EOMI, lids normal, conjuctivea clear, sclera anicteric. NECK:    Supple, symmetrical,  trachea midline, no thyromegaly, no JVD, no lymphadenopathy. LUNGS:    Clear to auscultation bilaterally, respirations unlabored, accessory muscles are not used. HEART:     Regular rate and rhythm, S1 and S2 normal, no murmur, rub or gallop. PMI in MCL. ABDOMEN:    Soft, non-tender, bowel sounds are normoactive, no masses, no hepatospleenomegaly. EXTREMITY:   no bipedal edema  NEURO:  Alert, oriented to person, place and time. Grossly intact. Musculoskeletal:         No kyphosis or scoliosis, mild tenderness in both of her knees. Skin:                            Warm and dry. No rash or obvious suspicious lesions. PSYCH:  Mood euthymic, insight and judgement good. ASSESSMENT/PLAN:    1. Essential hypertension  Stable,Continue current medications, denies side effect with medicationss. Low salt diet and exercise advised. Continue current medication for blood pressure  - lisinopril (PRINIVIL;ZESTRIL) 20 MG tablet; Take 1 tablet by mouth 2 times daily  Dispense: 60 tablet; Refill: 3  - amLODIPine (NORVASC) 10 MG tablet; Take 1 tablet by mouth daily  Dispense: 30 tablet;  Refill: 3  - chlorthalidone (HYGROTON) 25 MG tablet; TAKE ONE TABLET BY MOUTH DAILY  Dispense: 30 tablet; Refill: 3    2. Hypothyroidism due to acquired atrophy of thyroid  Continue Synthroid  - levothyroxine (SYNTHROID) 88 MCG tablet; Take 1 tablet by mouth Daily  Dispense: 30 tablet; Refill: 3    3. Type 2 diabetes mellitus with stage 4 chronic kidney disease, without long-term current use of insulin (HCC)  Continue Amaryl and Actos. Add Suman Lyles. Again advised her to check her sugars periodically. - POCT Glucose  - dapagliflozin (FARXIGA) 5 MG tablet; Take 1 tablet by mouth every morning  Dispense: 30 tablet; Refill: 5    4. Stage 4 chronic kidney disease ([address]  Advised to continue to follow with her nephrologist.  Avoid NSAID and keep well-hydrated. 5. Arthritis of both knees  Tylenol as needed. Prescription for handicap car permit was written and given to patient      6. Mixed hyperlipidemia  Patient is taking cholesterol medications regularly. Denies any side effects. Diet and exercise advised. Continue Lipitor      7. Chronic depression  Advised to continue to follow with her psychiatrist and take psych medications regularly. 8. Anxiety disorder, unspecified type   Advised to continue to follow with her psychiatrist and take psych medications regularly. 9. Tobacco abuse  Advised strongly to quit smoking. Patient does not want to quit smoking at this time. 10. Need for influenza vaccination  Flu shot was given. - INFLUENZA, QUADV, ADJUVANTED, 65 YRS =, IM, PF, PREFILL SYR, 0.5ML (FLUAD)        Care discussed with patient. Questions answered and patient verbalizes understanding and agrees with plan. Medications reviewed and reconciled. Continue current medications. Appropriate prescriptions are ordered. Risks and benefits of meds are discussed. After visit summary provided. Advised to call for any problems, questions, or concerns. If symptoms worsen or don't improve as expected, to call us or go to ER. Follow up as directed, sooner if needed. Return in about 3 months (around 3/29/2022). This dictation was performed with a verbal recognition program and it was checked for errors. It is possible that there are still dictated errors within this office note. Any errors should be brought immediately to my attention for correction. All efforts were made to ensure that this office note is accurate.      Zoya Rodriguez MD MD

## 2021-12-29 NOTE — LETTER
[address] [place], [place] [address] Internal and Family Medicine  [address] [place] [address]  Phone: [phone]  Fax: [phone]    José Manuel Chinchilla MD         December 29, 2021     Patient: Den Mix   YOB: 1945   Date of Visit: 12/29/2021       To Whom It May Concern: It is my medical opinion that Jose G Greenberg requires a disability parking placard for the following reasons:  Arthritis of the knees  Duration of need: 5 year (2026)    If you have any questions or concerns, please don't hesitate to call.     Sincerely,        José Manuel Chinchilla MD

## 2022-03-15 DIAGNOSIS — E78.2 MIXED HYPERLIPIDEMIA: ICD-10-CM

## 2022-03-16 RX ORDER — ATORVASTATIN CALCIUM 40 MG/1
TABLET, FILM COATED ORAL
Qty: 90 TABLET | Refills: 1 | Status: SHIPPED | OUTPATIENT
Start: 2022-03-16 | End: 2022-08-04 | Stop reason: SDUPTHER

## 2022-04-04 DIAGNOSIS — I10 ESSENTIAL HYPERTENSION: ICD-10-CM

## 2022-04-05 RX ORDER — METOPROLOL TARTRATE 50 MG/1
TABLET, FILM COATED ORAL
Qty: 60 TABLET | Refills: 3 | Status: ON HOLD
Start: 2022-04-05 | End: 2022-06-10 | Stop reason: HOSPADM

## 2022-04-20 DIAGNOSIS — E11.22 TYPE 2 DIABETES MELLITUS WITH STAGE 4 CHRONIC KIDNEY DISEASE, WITHOUT LONG-TERM CURRENT USE OF INSULIN (HCC): ICD-10-CM

## 2022-04-20 DIAGNOSIS — N18.4 TYPE 2 DIABETES MELLITUS WITH STAGE 4 CHRONIC KIDNEY DISEASE, WITHOUT LONG-TERM CURRENT USE OF INSULIN (HCC): ICD-10-CM

## 2022-04-21 RX ORDER — GLIMEPIRIDE 4 MG/1
4 TABLET ORAL 2 TIMES DAILY
Qty: 60 TABLET | Refills: 2 | Status: ON HOLD
Start: 2022-04-21 | End: 2022-06-10 | Stop reason: HOSPADM

## 2022-04-22 ENCOUNTER — OFFICE VISIT (OUTPATIENT)
Dept: INTERNAL MEDICINE CLINIC | Age: 77
End: 2022-04-22
Payer: MEDICARE

## 2022-04-22 VITALS
SYSTOLIC BLOOD PRESSURE: 130 MMHG | HEART RATE: 57 BPM | BODY MASS INDEX: 33.12 KG/M2 | OXYGEN SATURATION: 95 % | DIASTOLIC BLOOD PRESSURE: 74 MMHG | WEIGHT: 199 LBS

## 2022-04-22 DIAGNOSIS — E78.2 MIXED HYPERLIPIDEMIA: ICD-10-CM

## 2022-04-22 DIAGNOSIS — E11.22 TYPE 2 DIABETES MELLITUS WITH STAGE 4 CHRONIC KIDNEY DISEASE, WITHOUT LONG-TERM CURRENT USE OF INSULIN (HCC): ICD-10-CM

## 2022-04-22 DIAGNOSIS — I10 PRIMARY HYPERTENSION: ICD-10-CM

## 2022-04-22 DIAGNOSIS — E03.4 HYPOTHYROIDISM DUE TO ACQUIRED ATROPHY OF THYROID: ICD-10-CM

## 2022-04-22 DIAGNOSIS — Z91.81 AT HIGH RISK FOR FALLS: Primary | ICD-10-CM

## 2022-04-22 DIAGNOSIS — N18.4 TYPE 2 DIABETES MELLITUS WITH STAGE 4 CHRONIC KIDNEY DISEASE, WITHOUT LONG-TERM CURRENT USE OF INSULIN (HCC): ICD-10-CM

## 2022-04-22 DIAGNOSIS — F41.9 ANXIETY DISORDER, UNSPECIFIED TYPE: ICD-10-CM

## 2022-04-22 DIAGNOSIS — M17.0 ARTHRITIS OF BOTH KNEES: ICD-10-CM

## 2022-04-22 DIAGNOSIS — Z72.0 TOBACCO ABUSE: ICD-10-CM

## 2022-04-22 DIAGNOSIS — N18.4 STAGE 4 CHRONIC KIDNEY DISEASE (HCC): ICD-10-CM

## 2022-04-22 DIAGNOSIS — F32.A CHRONIC DEPRESSION: ICD-10-CM

## 2022-04-22 LAB
A/G RATIO: 1.8 (ref 1.1–2.2)
ALBUMIN SERPL-MCNC: 4.2 G/DL (ref 3.4–5)
ALP BLD-CCNC: 81 U/L (ref 40–129)
ALT SERPL-CCNC: 11 U/L (ref 10–40)
ANION GAP SERPL CALCULATED.3IONS-SCNC: 14 MMOL/L (ref 3–16)
AST SERPL-CCNC: 12 U/L (ref 15–37)
BASOPHILS ABSOLUTE: 0 K/UL (ref 0–0.2)
BASOPHILS RELATIVE PERCENT: 0.7 %
BILIRUB SERPL-MCNC: 0.3 MG/DL (ref 0–1)
BUN BLDV-MCNC: 43 MG/DL (ref 7–20)
CALCIUM SERPL-MCNC: 9.2 MG/DL (ref 8.3–10.6)
CHLORIDE BLD-SCNC: 104 MMOL/L (ref 99–110)
CHOLESTEROL, TOTAL: 108 MG/DL (ref 0–199)
CO2: 21 MMOL/L (ref 21–32)
CREAT SERPL-MCNC: 2 MG/DL (ref 0.6–1.2)
EOSINOPHILS ABSOLUTE: 0.1 K/UL (ref 0–0.6)
EOSINOPHILS RELATIVE PERCENT: 1.5 %
GFR AFRICAN AMERICAN: 29
GFR NON-AFRICAN AMERICAN: 24
GLUCOSE BLD-MCNC: 101 MG/DL (ref 70–99)
HCT VFR BLD CALC: 47.8 % (ref 36–48)
HDLC SERPL-MCNC: 35 MG/DL (ref 40–60)
HEMOGLOBIN: 16 G/DL (ref 12–16)
LDL CHOLESTEROL CALCULATED: 53 MG/DL
LYMPHOCYTES ABSOLUTE: 1 K/UL (ref 1–5.1)
LYMPHOCYTES RELATIVE PERCENT: 16.9 %
MCH RBC QN AUTO: 32.5 PG (ref 26–34)
MCHC RBC AUTO-ENTMCNC: 33.4 G/DL (ref 31–36)
MCV RBC AUTO: 97.1 FL (ref 80–100)
MONOCYTES ABSOLUTE: 0.4 K/UL (ref 0–1.3)
MONOCYTES RELATIVE PERCENT: 7 %
NEUTROPHILS ABSOLUTE: 4.3 K/UL (ref 1.7–7.7)
NEUTROPHILS RELATIVE PERCENT: 73.9 %
PDW BLD-RTO: 15.3 % (ref 12.4–15.4)
PLATELET # BLD: 156 K/UL (ref 135–450)
PMV BLD AUTO: 8.9 FL (ref 5–10.5)
POTASSIUM SERPL-SCNC: 5 MMOL/L (ref 3.5–5.1)
RBC # BLD: 4.92 M/UL (ref 4–5.2)
SODIUM BLD-SCNC: 139 MMOL/L (ref 136–145)
TOTAL PROTEIN: 6.5 G/DL (ref 6.4–8.2)
TRIGL SERPL-MCNC: 99 MG/DL (ref 0–150)
TSH SERPL DL<=0.05 MIU/L-ACNC: 4.74 UIU/ML (ref 0.27–4.2)
VLDLC SERPL CALC-MCNC: 20 MG/DL
WBC # BLD: 5.9 K/UL (ref 4–11)

## 2022-04-22 PROCEDURE — 99214 OFFICE O/P EST MOD 30 MIN: CPT | Performed by: INTERNAL MEDICINE

## 2022-04-22 PROCEDURE — 1090F PRES/ABSN URINE INCON ASSESS: CPT | Performed by: INTERNAL MEDICINE

## 2022-04-22 PROCEDURE — G8427 DOCREV CUR MEDS BY ELIG CLIN: HCPCS | Performed by: INTERNAL MEDICINE

## 2022-04-22 PROCEDURE — 36415 COLL VENOUS BLD VENIPUNCTURE: CPT | Performed by: INTERNAL MEDICINE

## 2022-04-22 PROCEDURE — 1123F ACP DISCUSS/DSCN MKR DOCD: CPT | Performed by: INTERNAL MEDICINE

## 2022-04-22 PROCEDURE — 4004F PT TOBACCO SCREEN RCVD TLK: CPT | Performed by: INTERNAL MEDICINE

## 2022-04-22 PROCEDURE — 4040F PNEUMOC VAC/ADMIN/RCVD: CPT | Performed by: INTERNAL MEDICINE

## 2022-04-22 PROCEDURE — G8400 PT W/DXA NO RESULTS DOC: HCPCS | Performed by: INTERNAL MEDICINE

## 2022-04-22 PROCEDURE — G8417 CALC BMI ABV UP PARAM F/U: HCPCS | Performed by: INTERNAL MEDICINE

## 2022-04-22 NOTE — PROGRESS NOTES
Name: Joan Olivo  [id number]  Age: 68 y.o. YOB: 1945  Sex: female    CHIEF COMPLAINT:    Chief Complaint   Patient presents with    Diabetes    Knee Pain       HISTORY OF PRESENT ILLNESS:     This is a pleasant  68 y.o. female  is seen today for management of chronic medical problems and medications refills. Previous records reviewed . Doing OK . Denies CP or SOB. No fever , sore throat or cough or congestion. Still smokes @ 1 1/2 PPD. Not willing to quit at this time but will try to slow down on smoking. Denies any abdominal pain. Appetite OK. Bowels moving [address] [address] No urinary symptoms. Still with pain in her knees but better. Takes Tylenol for it. Hearing is ok. Vision Ok with glasses. Denies  any significant skin lesions. Depression and anxiety better with medications. Seeing Dr. Rani Hernandez periodically for her psych problems. Does not check her sugars at home. No other complaints. She did not go for mammogram and bone density yet. She is  vaccinated for COVID-19 x2. But has not taken booster dose yet. Past Medical History:    Patient Active Problem List   Diagnosis    Stage 4 chronic [facility] disease ([facility] [address]    Type 2 diabetes mellitus with stage 4 chronic kidney disease, without long-term current use of insulin (HCC)    HTN (hypertension)    Hyperlipidemia    Hypothyroidism    Anxiety disorder    Tobacco abuse    Chronic depression    RLS (restless legs syndrome)    Arthritis of both knees        Past Surgical History:    History reviewed. No pertinent surgical history.     Social History:   Social History     Tobacco Use    Smoking status: Current Every Day Smoker     Packs/day: 1.00     Years: 59.00     Pack years: 59.00     Types: Cigarettes     Start date: 1962    Smokeless tobacco: Never Used   Substance Use Topics    Alcohol use: No       Family History:       Problem Relation Age of Onset    Diabetes Father     High Blood Pressure Father     Kidney Disease Father     Cancer Brother         unknown origin    Stroke Maternal Grandmother     Cancer Paternal Grandmother     Other Mother         hysterectomy    No Known Problems Brother        Allergies:  Seasonal    Current Medications :      Prior to Admission medications    Medication Sig Start Date End Date Taking? Authorizing Provider   glimepiride (AMARYL) 4 MG tablet Take 1 tablet by mouth in the morning and at bedtime 4/21/22  Yes Mckinley Aguilar MD   metoprolol tartrate (LOPRESSOR) 50 MG tablet TAKE ONE TABLET BY MOUTH TWICE A DAY 4/5/22  Yes Mckinley Aguilar MD   atorvastatin (LIPITOR) 40 MG tablet TAKE ONE TABLET BY MOUTH DAILY 3/16/22  Yes Mckinley Aguilar MD   spironolactone (ALDACTONE) 100 MG tablet Take 1 tablet by mouth daily 3/16/22  Yes Brandt Bacon MD   lisinopril (PRINIVIL;ZESTRIL) 20 MG tablet Take 1 tablet by mouth 2 times daily 12/29/21  Yes Mckinley Aguilar MD   amLODIPine (NORVASC) 10 MG tablet Take 1 tablet by mouth daily 12/29/21  Yes Mckinley Aguilar MD   levothyroxine (SYNTHROID) 88 MCG tablet Take 1 tablet by mouth Daily 12/29/21  Yes Mckinley Aguilar MD   chlorthalidone (HYGROTON) 25 MG tablet TAKE ONE TABLET BY MOUTH DAILY 12/29/21  Yes Mckinley Aguilar MD   dapagliflozin (FARXIGA) 5 MG tablet Take 1 tablet by mouth every morning 12/29/21  Yes Mckinley Aguilar MD   doxazosin (CARDURA) 2 MG tablet Take 1 tablet by mouth daily 12/7/21  Yes Brandt Bacon MD   pioglitazone (ACTOS) 30 MG tablet Take 1 tablet by mouth daily 9/27/21  Yes Mckinley Aguilar MD   busPIRone (BUSPAR) 15 MG tablet Take 15 mg by mouth 3 times daily  Patient taking differently: Take 15 mg by mouth 2 times daily  9/28/20  Yes Mckinley Aguilar MD   LORazepam (ATIVAN) 1 MG tablet Take 1 mg by mouth every 8 hours as needed for Anxiety. Yes Historical Provider, MD   sertraline (ZOLOFT) 25 MG tablet Take 200 mg by mouth daily    Yes Historical Provider, MD       LAB DATA: Reviewed.     REVIEW OF SYSTEMS:   see HPI/ Hypothyroidism due to acquired atrophy of thyroid  Patient on Synthroid  - TSH    5. Stage 4 chronic kidney disease (HCC)  Avoid NSAID and keep well-hydrated and keep following with her nephrologist.    6. Tobacco abuse  Advised extensively to quit smoking. Patient will try on her own to quit smoking. 7. Chronic depression  Advised to continue to follow with her psychiatrist and take psych medications regularly. 8. Anxiety disorder, unspecified type  Advised to follow with her psychiatrist and take psych medications regularly    9. Arthritis of both knees  Advised take Tylenol as needed    10. At high risk for falls  Advised fall precautions    Advised to continue to follow COVID-19 precautions    Care discussed with patient. Questions answered and patient verbalizes understanding and agrees with plan. Medications reviewed and reconciled. Continue current medications. Appropriate prescriptions are ordered. Risks and benefits of meds are discussed. After visit summary provided. Advised to call for any problems, questions, or concerns. If symptoms worsen or don't improve as expected, to call us or go to ER. Follow up as directed, sooner if needed. Return in about 3 months (around 7/22/2022). This dictation was performed with a verbal recognition program and it was checked for errors. It is possible that there are still dictated errors within this office note. Any errors should be brought immediately to my attention for correction. All efforts were made to ensure that this office note is accurate. Ellie Caal MD MD      On the basis of positive falls risk screening, assessment and plan is as follows: home safety tips provided.

## 2022-04-23 LAB
ESTIMATED AVERAGE GLUCOSE: 131.2 MG/DL
HBA1C MFR BLD: 6.2 %

## 2022-05-13 DIAGNOSIS — I10 ESSENTIAL HYPERTENSION: ICD-10-CM

## 2022-05-13 DIAGNOSIS — E11.22 TYPE 2 DIABETES MELLITUS WITH STAGE 4 CHRONIC KIDNEY DISEASE, WITHOUT LONG-TERM CURRENT USE OF INSULIN (HCC): ICD-10-CM

## 2022-05-13 DIAGNOSIS — N18.4 TYPE 2 DIABETES MELLITUS WITH STAGE 4 CHRONIC KIDNEY DISEASE, WITHOUT LONG-TERM CURRENT USE OF INSULIN (HCC): ICD-10-CM

## 2022-05-13 RX ORDER — CHLORTHALIDONE 25 MG/1
TABLET ORAL
Qty: 30 TABLET | Refills: 3 | Status: ON HOLD
Start: 2022-05-13 | End: 2022-06-10 | Stop reason: HOSPADM

## 2022-05-13 RX ORDER — AMLODIPINE BESYLATE 10 MG/1
10 TABLET ORAL DAILY
Qty: 30 TABLET | Refills: 3 | Status: SHIPPED | OUTPATIENT
Start: 2022-05-13 | End: 2022-09-01 | Stop reason: SDUPTHER

## 2022-05-31 ENCOUNTER — APPOINTMENT (OUTPATIENT)
Dept: CT IMAGING | Age: 77
DRG: 064 | End: 2022-05-31
Payer: MEDICARE

## 2022-05-31 ENCOUNTER — HOSPITAL ENCOUNTER (INPATIENT)
Age: 77
LOS: 5 days | Discharge: NURSING, REHAB OR LONG-TERM CARE | DRG: 064 | End: 2022-06-06
Attending: INTERNAL MEDICINE | Admitting: INTERNAL MEDICINE
Payer: MEDICARE

## 2022-05-31 ENCOUNTER — APPOINTMENT (OUTPATIENT)
Dept: GENERAL RADIOLOGY | Age: 77
DRG: 064 | End: 2022-05-31
Payer: MEDICARE
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
## 2022-05-31 DIAGNOSIS — R53.1 GENERAL WEAKNESS: Primary | ICD-10-CM

## 2022-05-31 DIAGNOSIS — M79.605 LEFT LEG PAIN: ICD-10-CM

## 2022-05-31 DIAGNOSIS — N18.9 ACUTE RENAL FAILURE SUPERIMPOSED ON CHRONIC KIDNEY DISEASE, UNSPECIFIED CKD STAGE, UNSPECIFIED ACUTE RENAL FAILURE TYPE (HCC): ICD-10-CM

## 2022-05-31 DIAGNOSIS — N17.9 ACUTE RENAL FAILURE SUPERIMPOSED ON CHRONIC KIDNEY DISEASE, UNSPECIFIED CKD STAGE, UNSPECIFIED ACUTE RENAL FAILURE TYPE (HCC): ICD-10-CM

## 2022-05-31 DIAGNOSIS — R77.8 ELEVATED TROPONIN: ICD-10-CM

## 2022-05-31 DIAGNOSIS — E87.5 HYPERKALEMIA: ICD-10-CM

## 2022-05-31 LAB
ALBUMIN SERPL-MCNC: 3.7 GM/DL (ref 3.4–5)
ALP BLD-CCNC: 103 IU/L (ref 40–129)
ALT SERPL-CCNC: 23 U/L (ref 10–40)
ANION GAP SERPL CALCULATED.3IONS-SCNC: 15 MMOL/L (ref 4–16)
AST SERPL-CCNC: 51 IU/L (ref 15–37)
BASOPHILS ABSOLUTE: 0 K/CU MM
BASOPHILS RELATIVE PERCENT: 0.4 % (ref 0–1)
BILIRUB SERPL-MCNC: 0.2 MG/DL (ref 0–1)
BUN BLDV-MCNC: 82 MG/DL (ref 6–23)
CALCIUM SERPL-MCNC: 8.7 MG/DL (ref 8.3–10.6)
CHLORIDE BLD-SCNC: 107 MMOL/L (ref 99–110)
CO2: 10 MMOL/L (ref 21–32)
CREAT SERPL-MCNC: 3.1 MG/DL (ref 0.6–1.1)
DIFFERENTIAL TYPE: ABNORMAL
EOSINOPHILS ABSOLUTE: 0 K/CU MM
EOSINOPHILS RELATIVE PERCENT: 0 % (ref 0–3)
GFR AFRICAN AMERICAN: 18 ML/MIN/1.73M2
GFR NON-AFRICAN AMERICAN: 15 ML/MIN/1.73M2
GLUCOSE BLD-MCNC: 104 MG/DL (ref 70–99)
GLUCOSE BLD-MCNC: 107 MG/DL (ref 70–99)
GLUCOSE BLD-MCNC: 107 MG/DL (ref 70–99)
HCT VFR BLD CALC: 55 % (ref 37–47)
HEMOGLOBIN: 17.4 GM/DL (ref 12.5–16)
IMMATURE NEUTROPHIL %: 0.5 % (ref 0–0.43)
LACTATE: 0.8 MMOL/L (ref 0.4–2)
LYMPHOCYTES ABSOLUTE: 0.5 K/CU MM
LYMPHOCYTES RELATIVE PERCENT: 4.6 % (ref 24–44)
MCH RBC QN AUTO: 32.3 PG (ref 27–31)
MCHC RBC AUTO-ENTMCNC: 31.6 % (ref 32–36)
MCV RBC AUTO: 102 FL (ref 78–100)
MONOCYTES ABSOLUTE: 0.6 K/CU MM
MONOCYTES RELATIVE PERCENT: 5.2 % (ref 0–4)
NUCLEATED RBC %: 0 %
PDW BLD-RTO: 14.9 % (ref 11.7–14.9)
PLATELET # BLD: 158 K/CU MM (ref 140–440)
PMV BLD AUTO: 10.7 FL (ref 7.5–11.1)
POTASSIUM SERPL-SCNC: 5.4 MMOL/L (ref 3.5–5.1)
PRO-BNP: 1061 PG/ML
RBC # BLD: 5.39 M/CU MM (ref 4.2–5.4)
SEGMENTED NEUTROPHILS ABSOLUTE COUNT: 10 K/CU MM
SEGMENTED NEUTROPHILS RELATIVE PERCENT: 89.3 % (ref 36–66)
SODIUM BLD-SCNC: 132 MMOL/L (ref 135–145)
TOTAL IMMATURE NEUTOROPHIL: 0.06 K/CU MM
TOTAL NUCLEATED RBC: 0 K/CU MM
TOTAL PROTEIN: 6.7 GM/DL (ref 6.4–8.2)
TROPONIN T: 0.33 NG/ML
WBC # BLD: 11.2 K/CU MM (ref 4–10.5)

## 2022-05-31 PROCEDURE — 73590 X-RAY EXAM OF LOWER LEG: CPT

## 2022-05-31 PROCEDURE — 72125 CT NECK SPINE W/O DYE: CPT

## 2022-05-31 PROCEDURE — 84484 ASSAY OF TROPONIN QUANT: CPT

## 2022-05-31 PROCEDURE — 81001 URINALYSIS AUTO W/SCOPE: CPT

## 2022-05-31 PROCEDURE — 83605 ASSAY OF LACTIC ACID: CPT

## 2022-05-31 PROCEDURE — 99285 EMERGENCY DEPT VISIT HI MDM: CPT

## 2022-05-31 PROCEDURE — 93005 ELECTROCARDIOGRAM TRACING: CPT | Performed by: PHYSICIAN ASSISTANT

## 2022-05-31 PROCEDURE — 83880 ASSAY OF NATRIURETIC PEPTIDE: CPT

## 2022-05-31 PROCEDURE — 85025 COMPLETE CBC W/AUTO DIFF WBC: CPT

## 2022-05-31 PROCEDURE — 82962 GLUCOSE BLOOD TEST: CPT

## 2022-05-31 PROCEDURE — 70450 CT HEAD/BRAIN W/O DYE: CPT

## 2022-05-31 PROCEDURE — 80053 COMPREHEN METABOLIC PANEL: CPT

## 2022-05-31 PROCEDURE — 2580000003 HC RX 258: Performed by: PHYSICIAN ASSISTANT

## 2022-05-31 PROCEDURE — 71045 X-RAY EXAM CHEST 1 VIEW: CPT

## 2022-05-31 RX ORDER — 0.9 % SODIUM CHLORIDE 0.9 %
1000 INTRAVENOUS SOLUTION INTRAVENOUS ONCE
Status: COMPLETED | OUTPATIENT
Start: 2022-05-31 | End: 2022-06-01

## 2022-05-31 RX ADMIN — SODIUM CHLORIDE 1000 ML: 9 INJECTION, SOLUTION INTRAVENOUS at 23:26

## 2022-05-31 ASSESSMENT — PAIN - FUNCTIONAL ASSESSMENT: PAIN_FUNCTIONAL_ASSESSMENT: NONE - DENIES PAIN

## 2022-05-31 NOTE — ED PROVIDER NOTES
12 lead EKG per my interpretation:  Normal Sinus Rhythm with PVCs  Axis is   Normal  QTc is  prolonged at 522. There is no specific T wave changes appreciated. There is no specific ST wave changes appreciated. RBBB  No STEMI    Prior EKG to compare with was NOT available.     MD Zac Colunga MD  05/31/22 [id number]

## 2022-05-31 NOTE — ED TRIAGE NOTES
Pt arrives via EMS from home with family for generalized all over weakness and frequent falls. Family at bedside states that EMS was at house today and that patient's blood sugar was 33 and that patient got dextrose infusion and refused transport. Pt denies blood thinner used. Denies head injury. States that she is a diabetic and is on PO medication.

## 2022-06-01 ENCOUNTER — APPOINTMENT (OUTPATIENT)
Dept: NUCLEAR MEDICINE | Age: 77
DRG: 064 | End: 2022-06-01
Payer: MEDICARE

## 2022-06-01 PROBLEM — R53.1 GENERALIZED WEAKNESS: Status: ACTIVE | Noted: 2022-06-01

## 2022-06-01 LAB
BACTERIA: NEGATIVE /HPF
BETA-HYDROXYBUTYRATE: 16.3 MG/DL (ref 0–3)
BILIRUBIN URINE: ABNORMAL MG/DL
BLOOD, URINE: ABNORMAL
CLARITY: CLEAR
COLOR: YELLOW
GLUCOSE BLD-MCNC: 139 MG/DL (ref 70–99)
GLUCOSE BLD-MCNC: 147 MG/DL (ref 70–99)
GLUCOSE BLD-MCNC: 181 MG/DL (ref 70–99)
GLUCOSE BLD-MCNC: 75 MG/DL (ref 70–99)
GLUCOSE, URINE: NEGATIVE MG/DL
KETONES, URINE: ABNORMAL MG/DL
LEUKOCYTE ESTERASE, URINE: NEGATIVE
LV EF: 55 %
LV EF: 77 %
LVEF MODALITY: NORMAL
LVEF MODALITY: NORMAL
NITRITE URINE, QUANTITATIVE: NEGATIVE
PH, URINE: 5.5 (ref 5–8)
PROTEIN UA: ABNORMAL MG/DL
RBC URINE: <1 /HPF (ref 0–6)
SPECIFIC GRAVITY UA: 1.02 (ref 1–1.03)
SQUAMOUS EPITHELIAL: 2 /HPF
TOTAL CK: 1722 IU/L (ref 26–140)
TRICHOMONAS: ABNORMAL /HPF
TROPONIN T: 0.47 NG/ML
UROBILINOGEN, URINE: 0.2 MG/DL (ref 0.2–1)
WBC UA: <1 /HPF (ref 0–5)

## 2022-06-01 PROCEDURE — 2500000003 HC RX 250 WO HCPCS: Performed by: HOSPITALIST

## 2022-06-01 PROCEDURE — 3430000000 HC RX DIAGNOSTIC RADIOPHARMACEUTICAL: Performed by: INTERNAL MEDICINE

## 2022-06-01 PROCEDURE — 93306 TTE W/DOPPLER COMPLETE: CPT

## 2022-06-01 PROCEDURE — 6370000000 HC RX 637 (ALT 250 FOR IP): Performed by: INTERNAL MEDICINE

## 2022-06-01 PROCEDURE — 84484 ASSAY OF TROPONIN QUANT: CPT

## 2022-06-01 PROCEDURE — 97116 GAIT TRAINING THERAPY: CPT

## 2022-06-01 PROCEDURE — 6360000002 HC RX W HCPCS: Performed by: INTERNAL MEDICINE

## 2022-06-01 PROCEDURE — 97535 SELF CARE MNGMENT TRAINING: CPT

## 2022-06-01 PROCEDURE — 78452 HT MUSCLE IMAGE SPECT MULT: CPT

## 2022-06-01 PROCEDURE — 2500000003 HC RX 250 WO HCPCS: Performed by: INTERNAL MEDICINE

## 2022-06-01 PROCEDURE — 93017 CV STRESS TEST TRACING ONLY: CPT

## 2022-06-01 PROCEDURE — 94761 N-INVAS EAR/PLS OXIMETRY MLT: CPT

## 2022-06-01 PROCEDURE — 2580000003 HC RX 258: Performed by: HOSPITALIST

## 2022-06-01 PROCEDURE — 97166 OT EVAL MOD COMPLEX 45 MIN: CPT

## 2022-06-01 PROCEDURE — A9500 TC99M SESTAMIBI: HCPCS | Performed by: INTERNAL MEDICINE

## 2022-06-01 PROCEDURE — 82550 ASSAY OF CK (CPK): CPT

## 2022-06-01 PROCEDURE — 82962 GLUCOSE BLOOD TEST: CPT

## 2022-06-01 PROCEDURE — 97530 THERAPEUTIC ACTIVITIES: CPT

## 2022-06-01 PROCEDURE — 2580000003 HC RX 258: Performed by: INTERNAL MEDICINE

## 2022-06-01 PROCEDURE — 1200000000 HC SEMI PRIVATE

## 2022-06-01 PROCEDURE — 97162 PT EVAL MOD COMPLEX 30 MIN: CPT

## 2022-06-01 PROCEDURE — 99211 OFF/OP EST MAY X REQ PHY/QHP: CPT

## 2022-06-01 PROCEDURE — 36415 COLL VENOUS BLD VENIPUNCTURE: CPT

## 2022-06-01 PROCEDURE — 99223 1ST HOSP IP/OBS HIGH 75: CPT | Performed by: INTERNAL MEDICINE

## 2022-06-01 PROCEDURE — 82010 KETONE BODYS QUAN: CPT

## 2022-06-01 RX ORDER — SODIUM CHLORIDE 0.9 % (FLUSH) 0.9 %
5-40 SYRINGE (ML) INJECTION EVERY 12 HOURS SCHEDULED
Status: DISCONTINUED | OUTPATIENT
Start: 2022-06-01 | End: 2022-06-06 | Stop reason: HOSPADM

## 2022-06-01 RX ORDER — LORAZEPAM 1 MG/1
1 TABLET ORAL EVERY 8 HOURS PRN
Status: DISCONTINUED | OUTPATIENT
Start: 2022-06-01 | End: 2022-06-06 | Stop reason: HOSPADM

## 2022-06-01 RX ORDER — POLYETHYLENE GLYCOL 3350 17 G/17G
17 POWDER, FOR SOLUTION ORAL DAILY PRN
Status: DISCONTINUED | OUTPATIENT
Start: 2022-06-01 | End: 2022-06-06 | Stop reason: HOSPADM

## 2022-06-01 RX ORDER — BUSPIRONE HYDROCHLORIDE 15 MG/1
15 TABLET ORAL 2 TIMES DAILY
Status: DISCONTINUED | OUTPATIENT
Start: 2022-06-01 | End: 2022-06-06 | Stop reason: HOSPADM

## 2022-06-01 RX ORDER — SODIUM CHLORIDE 0.9 % (FLUSH) 0.9 %
5-40 SYRINGE (ML) INJECTION PRN
Status: DISCONTINUED | OUTPATIENT
Start: 2022-06-01 | End: 2022-06-06 | Stop reason: HOSPADM

## 2022-06-01 RX ORDER — LEVOTHYROXINE SODIUM 88 UG/1
88 TABLET ORAL DAILY
Status: DISCONTINUED | OUTPATIENT
Start: 2022-06-01 | End: 2022-06-06 | Stop reason: HOSPADM

## 2022-06-01 RX ORDER — ATORVASTATIN CALCIUM 40 MG/1
40 TABLET, FILM COATED ORAL DAILY
Status: DISCONTINUED | OUTPATIENT
Start: 2022-06-01 | End: 2022-06-06 | Stop reason: HOSPADM

## 2022-06-01 RX ORDER — ACETAMINOPHEN 325 MG/1
650 TABLET ORAL EVERY 6 HOURS PRN
Status: DISCONTINUED | OUTPATIENT
Start: 2022-06-01 | End: 2022-06-06 | Stop reason: HOSPADM

## 2022-06-01 RX ORDER — DEXTROSE MONOHYDRATE 50 MG/ML
INJECTION, SOLUTION INTRAVENOUS CONTINUOUS
Status: DISCONTINUED | OUTPATIENT
Start: 2022-06-01 | End: 2022-06-01

## 2022-06-01 RX ORDER — ONDANSETRON 4 MG/1
4 TABLET, ORALLY DISINTEGRATING ORAL EVERY 8 HOURS PRN
Status: DISCONTINUED | OUTPATIENT
Start: 2022-06-01 | End: 2022-06-06 | Stop reason: HOSPADM

## 2022-06-01 RX ORDER — HEPARIN SODIUM 5000 [USP'U]/ML
5000 INJECTION, SOLUTION INTRAVENOUS; SUBCUTANEOUS 3 TIMES DAILY
Status: DISCONTINUED | OUTPATIENT
Start: 2022-06-01 | End: 2022-06-06 | Stop reason: HOSPADM

## 2022-06-01 RX ORDER — SODIUM CHLORIDE 9 MG/ML
INJECTION, SOLUTION INTRAVENOUS PRN
Status: DISCONTINUED | OUTPATIENT
Start: 2022-06-01 | End: 2022-06-06 | Stop reason: HOSPADM

## 2022-06-01 RX ORDER — DEXTROSE MONOHYDRATE 50 MG/ML
100 INJECTION, SOLUTION INTRAVENOUS PRN
Status: DISCONTINUED | OUTPATIENT
Start: 2022-06-01 | End: 2022-06-06 | Stop reason: HOSPADM

## 2022-06-01 RX ORDER — AMLODIPINE BESYLATE 10 MG/1
10 TABLET ORAL DAILY
Status: DISCONTINUED | OUTPATIENT
Start: 2022-06-01 | End: 2022-06-06 | Stop reason: HOSPADM

## 2022-06-01 RX ORDER — SODIUM CHLORIDE 9 MG/ML
INJECTION, SOLUTION INTRAVENOUS CONTINUOUS
Status: DISCONTINUED | OUTPATIENT
Start: 2022-06-01 | End: 2022-06-02

## 2022-06-01 RX ORDER — METOPROLOL TARTRATE 50 MG/1
50 TABLET, FILM COATED ORAL 2 TIMES DAILY
Status: DISCONTINUED | OUTPATIENT
Start: 2022-06-01 | End: 2022-06-06 | Stop reason: HOSPADM

## 2022-06-01 RX ORDER — ONDANSETRON 2 MG/ML
4 INJECTION INTRAMUSCULAR; INTRAVENOUS EVERY 6 HOURS PRN
Status: DISCONTINUED | OUTPATIENT
Start: 2022-06-01 | End: 2022-06-06 | Stop reason: HOSPADM

## 2022-06-01 RX ADMIN — KIT FOR THE PREPARATION OF TECHNETIUM TC99M SESTAMIBI 30 MILLICURIE: 1 INJECTION, POWDER, LYOPHILIZED, FOR SOLUTION PARENTERAL at 14:50

## 2022-06-01 RX ADMIN — HEPARIN SODIUM 5000 UNITS: 5000 INJECTION INTRAVENOUS; SUBCUTANEOUS at 09:46

## 2022-06-01 RX ADMIN — MICONAZOLE NITRATE: 2 POWDER TOPICAL at 21:07

## 2022-06-01 RX ADMIN — SODIUM CHLORIDE: 9 INJECTION, SOLUTION INTRAVENOUS at 09:53

## 2022-06-01 RX ADMIN — SERTRALINE HYDROCHLORIDE 200 MG: 50 TABLET ORAL at 09:46

## 2022-06-01 RX ADMIN — REGADENOSON 0.4 MG: 0.08 INJECTION, SOLUTION INTRAVENOUS at 13:45

## 2022-06-01 RX ADMIN — BUSPIRONE HYDROCHLORIDE 15 MG: 15 TABLET ORAL at 21:06

## 2022-06-01 RX ADMIN — SODIUM CHLORIDE: 9 INJECTION, SOLUTION INTRAVENOUS at 23:37

## 2022-06-01 RX ADMIN — AMLODIPINE BESYLATE 10 MG: 10 TABLET ORAL at 09:46

## 2022-06-01 RX ADMIN — SODIUM BICARBONATE: 84 INJECTION, SOLUTION INTRAVENOUS at 05:36

## 2022-06-01 RX ADMIN — BUSPIRONE HYDROCHLORIDE 15 MG: 15 TABLET ORAL at 09:46

## 2022-06-01 RX ADMIN — METOPROLOL TARTRATE 50 MG: 50 TABLET, FILM COATED ORAL at 21:06

## 2022-06-01 RX ADMIN — LEVOTHYROXINE SODIUM 88 MCG: 0.09 TABLET ORAL at 08:13

## 2022-06-01 RX ADMIN — KIT FOR THE PREPARATION OF TECHNETIUM TC99M SESTAMIBI 10 MILLICURIE: 1 INJECTION, POWDER, LYOPHILIZED, FOR SOLUTION PARENTERAL at 14:50

## 2022-06-01 RX ADMIN — SODIUM CHLORIDE, PRESERVATIVE FREE 10 ML: 5 INJECTION INTRAVENOUS at 10:06

## 2022-06-01 RX ADMIN — METOPROLOL TARTRATE 50 MG: 50 TABLET, FILM COATED ORAL at 09:45

## 2022-06-01 RX ADMIN — HEPARIN SODIUM 5000 UNITS: 5000 INJECTION INTRAVENOUS; SUBCUTANEOUS at 21:07

## 2022-06-01 RX ADMIN — SODIUM CHLORIDE, PRESERVATIVE FREE 10 ML: 5 INJECTION INTRAVENOUS at 21:07

## 2022-06-01 RX ADMIN — MICONAZOLE NITRATE: 2 POWDER TOPICAL at 15:08

## 2022-06-01 RX ADMIN — HEPARIN SODIUM 5000 UNITS: 5000 INJECTION INTRAVENOUS; SUBCUTANEOUS at 15:07

## 2022-06-01 RX ADMIN — ATORVASTATIN CALCIUM 40 MG: 40 TABLET, FILM COATED ORAL at 09:46

## 2022-06-01 ASSESSMENT — PAIN DESCRIPTION - ORIENTATION: ORIENTATION: RIGHT;LEFT

## 2022-06-01 ASSESSMENT — PAIN DESCRIPTION - LOCATION: LOCATION: LEG

## 2022-06-01 ASSESSMENT — PAIN SCALES - GENERAL
PAINLEVEL_OUTOF10: 2
PAINLEVEL_OUTOF10: 0

## 2022-06-01 NOTE — CONSULTS
Makenna Moctezuma, 1945, [id number], 6/1/2022    History  [place]:  The primary encounter diagnosis was General weakness. Diagnoses of Acute renal failure superimposed on chronic kidney disease, unspecified CKD stage, unspecified acute renal failure type ([address] Elevated troponin, Fall, initial encounter, Left leg pain, and Hyperkalemia were also pertinent to this visit. Patient  has a past medical history of Acute kidney failure ([address] Chronic kidney disease, Hypertension, Hypothyroidism, and Type 2 diabetes mellitus without complication ([address] Patient  has no past surgical history on file. Subjective:  Patient states:  \"I'm past the level of brushing my hair. \"    Pain:  Denies pain at rest.    Communication with other providers:  Handoff to RN, OT  Restrictions: general precautions, fall risk    Home Setup/Prior level of function  Social/Functional History  Lives With: Son,Daughter  Type of Home: House  Home Layout: Two level  Home Access: Stairs to enter with rails  Entrance Stairs - Number of Steps: 1  Entrance Stairs - Rails: None  Bathroom Shower/Tub: Tub/Shower unit  Bathroom Toilet: Standard  Bathroom Equipment: Shower chair  Has the patient had two or more falls in the past year or any fall with injury in the past year?: Yes  Receives Help From: Family  ADL Assistance: Independent  Homemaking Assistance: Needs assistance  Homemaking Responsibilities: No  Ambulation Assistance: Independent  Transfer Assistance: Independent  Active : Yes  Mode of Transportation: Car  Occupation: Retired  Leisure & Hobbies: scratch lottery, coloring  IADL Comments: Receives help from son: Gael   **PLOF provided by son as pt poor historian    Examination of body systems (includes body structures/functions, activity/participation limitations):  · Observation:  Pt supine in bed upon arrival and agreeable to therapy  · Vision:  [facility]  · Hearing:  [place]  · Cardiopulmonary:  No O2 needs  · Cognition: impaired- oriented to self only, see OT/SLP note for further evaluation. Musculoskeletal  · ROM R/L:  WFL. · Strength R/L:  4/5, moderate impairment in function and endurance. · Neuro:  WFL      Mobility:  · Rolling L/R:  Min A with cues for sequencing   · Supine to sit: Max A with assist at bilateral LEs, hips, and trunk. Cues provided for sequencing  · Sit to supine: mod A with assist at hips and bilateral LEs. Pt dependent x2 to scoot higher in bed  · Transfers: pt completed STS to/from EOB x3 trials min A, unable to complete, and mod A with cues for hand placement and sequencing  · Sitting balance:  Fair+. · Standing balance:  fair. · Gait: pt ambulated 2 steps without a device but with increased L LE pain. Pt then ambulated 15' x2 with RW CGA/min A with overall unsteadiness. Pt ambulated with decreased logan, narrow LIZ, forward flexed posture, and decreased stance phase on L LE. Cues provided for walker management and general safety throughout. [facility] 6 Clicks Inpatient Mobility:  AM-PAC Inpatient Mobility Raw Score : 14    Safety: patient left supine in bed with alarm on, son in room, call light within reach, RN notified, gait belt used. Assessment:  Pt is a 68 y.o. female admitted to the hospital for generalized weakness. Pt is typically independent with all ambulation and transfers without a device. Pt is currently performing bed mobility max A, transferring mod A, and ambulating 15' with RW min A. Pt is presenting with decreased endurance, impaired balance, impaired transfers, impaired gait, impaired cognition, and impaired strength. Pt would benefit from continued acute care PT as well as SNF placement upon discharge to continue to address impairments. Complexity: moderate    Prognosis: Good, no significant barriers to participation at this time.      Plan: 3-5 times per week/week     Equipment: TBD at next level of care    Goals:  Short Term Goals  Time Frame for Short term goals: 1 week  Short term goal 1: Pt to complete all bed mobility min A  Short term goal 2: Pt to complete all STS transfers to/from bed, commode, and chair min A  Short term goal 3: Pt to ambulate 48' with LRAD min A       Treatment plan:  Bed mobility, transfers, balance, gait, TA, TX    Recommendations for NURSING mobility: amb with gait belt and RW    Time:   Time in: 1044  Time out: 1118  Timed treatment minutes: 23  Total time: 31    Electronically signed by:    Vero George PT  6/1/2022, 1:21 PM

## 2022-06-01 NOTE — ED PROVIDER NOTES
Emer[facility] [address]GENCY [facility]    Patient: Joan Olivo  MRN: [id number]  : 1945  Date of Evaluation: 2022  ED Provider: Alan Hester PA-C    Chief Complaint       Chief Complaint   Patient presents with    Fatigue     weakness all over, frequent falls, low blood sugar       SHYLA Olivo is a 68 y.o. female who presents to the emergency department for multiple issues. Patient has been weak over the last 2 days. She is generally ambulatory on her own. Last night, her son had to help her up the stairs to bed. This afternoon, daughter found her unresponsive on the floor around 2pm.  Last seen around 11am prior to that. EMS came out and patient's blood sugar was 33, so they gave D10 and she became responsive. She refused transport at that time. Patient has continued to be generally weak. Tonight daughters decided to keep her on the ground floor of the house and then found her trying to crawl up the stairs. They then helped her to the commode but again she was too weak when they were trying to get her up, so she fell and twisted her left leg. She has pain over the left shin as a result. Denies any other injuries from falling. Patient denies any fevers, chills. Denies nasal congestion, sore throat, cough. Denies cp or sob. Denies n/v/d.      ROS     CONSTITUTIONAL:  Denies fever.  + generalized weakness. EYES:  Denies visual changes. HEAD:  Denies headache. ENT:  Denies earache, nasal congestion, sore throat. NECK:  Denies neck pain. RESPIRATORY:  Denies any shortness of breath. CARDIOVASCULAR:  Denies chest pain. GI:  Denies nausea or vomiting. :  Denies urinary symptoms. MUSCULOSKELETAL:  + left leg pain. BACK:  Denies back pain. INTEGUMENT:  Denies skin changes. LYMPHATIC:  Denies lymphadenopathy. NEUROLOGIC:  + unresponsive episode.     Past History     Past Medical History:   Diagnosis Date    Acute kidney failure ([facility] 2/16/2018    Chronic kidney disease     Hypertension     Hypothyroidism     Type 2 diabetes mellitus without complication ([facility]      History reviewed. No pertinent surgical history. Social History     Socioeconomic History    Marital status:      Spouse name: None    Number of children: None    Years of education: None    Highest education level: None   Occupational History    None   Tobacco Use    Smoking status: Current Every Day Smoker     Packs/day: 1.00     Years: 59.00     Pack years: 59.00     Types: Cigarettes     Start date: 1962    Smokeless tobacco: Never Used   Vaping Use    Vaping Use: Never used   Substance and Sexual Activity    Alcohol use: No    Drug use: None    Sexual activity: None   Other Topics Concern    None   Social History Narrative    None     Social Determinants of Health     Financial Resource Strain: Low Risk     Difficulty of Paying Living Expenses: Not very hard   Food Insecurity: No Food Insecurity    Worried About Running Out of Food in the Last Year: Never true    Imelda of Food in the Last Year: Never true   Transportation Needs:     Lack of Transportation (Medical): Not on file    Lack of Transportation (Non-Medical):  Not on file   Physical Activity:     Days of Exercise per Week: Not on file    Minutes of Exercise per Session: Not on file   Stress:     Feeling of Stress : Not on file   Social Connections:     Frequency of Communication with Friends and Family: Not on file    Frequency of Social Gatherings with Friends and Family: Not on file    Attends Protestant Services: Not on file    Active Member of Clubs or Organizations: Not on file    Attends Club or Organization Meetings: Not on file    Marital Status: Not on file   Intimate Partner Violence:     Fear of Current or Ex-Partner: Not on file    Emotionally Abused: Not on file    Physically Abused: Not on file    Sexually Abused: Not on file Housing Stability:     Unable to Pay for Housing in the Last Year: Not on file    Number of Places Lived in the Last Year: Not on file    Unstable Housing in the Last Year: Not on file       Medications/Allergies     Previous Medications    AMLODIPINE (NORVASC) 10 MG TABLET    Take 1 tablet by mouth daily    ATORVASTATIN (LIPITOR) 40 MG TABLET    TAKE ONE TABLET BY MOUTH DAILY    BUSPIRONE (BUSPAR) 15 MG TABLET    Take 15 mg by mouth 3 times daily    CHLORTHALIDONE (HYGROTON) 25 MG TABLET    TAKE ONE TABLET BY MOUTH DAILY    DAPAGLIFLOZIN (FARXIGA) 5 MG TABLET    Take 1 tablet by mouth every morning    DOXAZOSIN (CARDURA) 2 MG TABLET    Take 1 tablet by mouth daily    GLIMEPIRIDE (AMARYL) 4 MG TABLET    Take 1 tablet by mouth in the morning and at bedtime    LEVOTHYROXINE (SYNTHROID) 88 MCG TABLET    Take 1 tablet by mouth Daily    LISINOPRIL (PRINIVIL;ZESTRIL) 20 MG TABLET    Take 1 tablet by mouth 2 times daily    LORAZEPAM (ATIVAN) 1 MG TABLET    Take 1 mg by mouth every 8 hours as needed for Anxiety. METOPROLOL TARTRATE (LOPRESSOR) 50 MG TABLET    TAKE ONE TABLET BY MOUTH TWICE A DAY    PIOGLITAZONE (ACTOS) 30 MG TABLET    Take 1 tablet by mouth daily    SERTRALINE (ZOLOFT) 25 MG TABLET    Take 200 mg by mouth daily     SPIRONOLACTONE (ALDACTONE) 100 MG TABLET    Take 1 tablet by mouth daily     Allergies   Allergen Reactions    Seasonal         Physical Exam       ED Triage Vitals [05/31/22 1922]   BP Temp Temp Source Heart Rate Resp SpO2 Height Weight   (!) 138/52 99 °F (37.2 °C) Oral 69 20 99 % 5' 5\" (1.651 m) 200 lb (90.7 kg)     GENERAL APPEARANCE:  Well-developed, well-nourished elderly female in no acute distress. HEAD:  NC/AT. EYES:  Sclera anicteric. ENT:  Ears, nose, mouth normal.     NECK:  Supple. CARDIO:  RRR. LUNGS:   CTAB. Respirations unlabored. ABDOMEN:  Soft, non-distended, non-tender. BS active. BACK:  No midline thoracic or lumbar spinal tenderness.     EXTREMITIES:  No acute deformities, no swelling bruising abrasion of the LLE. DP intact. SKIN:  Warm and dry. NEUROLOGICAL:  Alert and oriented. PSYCHIATRIC:  Normal mood.      Diagnostics     Labs:  Results for orders placed or performed during the hospital encounter of 05/31/22   CBC with Auto Differential   Result Value Ref Range    WBC 11.2 (H) 4.0 - 10.5 K/CU MM    RBC 5.39 4.2 - 5.4 M/CU MM    Hemoglobin 17.4 (H) 12.5 - 16.0 GM/DL    Hematocrit 55.0 (H) 37 - 47 %    .0 (H) 78 - 100 FL    MCH 32.3 (H) 27 - 31 PG    MCHC 31.6 (L) 32.0 - 36.0 %    RDW 14.9 11.7 - 14.9 %    Platelets 348 431 - 588 K/CU MM    MPV 10.7 7.5 - 11.1 FL    Differential Type AUTOMATED DIFFERENTIAL     Segs Relative 89.3 (H) 36 - 66 %    Lymphocytes % 4.6 (L) 24 - 44 %    Monocytes % 5.2 (H) 0 - 4 %    Eosinophils % 0.0 0 - 3 %    Basophils % 0.4 0 - 1 %    Segs Absolute 10.0 K/CU MM    Lymphocytes Absolute 0.5 K/CU MM    Monocytes Absolute 0.6 K/CU MM    Eosinophils Absolute 0.0 K/CU MM    Basophils Absolute 0.0 K/CU MM    Nucleated RBC % 0.0 %    Total Nucleated RBC 0.0 K/CU MM    Total Immature Neutrophil 0.06 K/CU MM    Immature Neutrophil % 0.5 (H) 0 - 0.43 %   Comprehensive Metabolic Panel w/ Reflex to MG   Result Value Ref Range    Sodium 132 (L) 135 - 145 MMOL/L    Potassium 5.4 (H) 3.5 - 5.1 MMOL/L    Chloride 107 99 - 110 mMol/L    CO2 10 (L) 21 - 32 MMOL/L    BUN 82 (H) 6 - 23 MG/DL    CREATININE 3.1 (H) 0.6 - 1.1 MG/DL    Glucose 107 (H) 70 - 99 MG/DL    Calcium 8.7 8.3 - 10.6 MG/DL    Albumin 3.7 3.4 - 5.0 GM/DL    Total Protein 6.7 6.4 - 8.2 GM/DL    Total Bilirubin 0.2 0.0 - 1.0 MG/DL    ALT 23 10 - 40 U/L    AST 51 (H) 15 - 37 IU/L    Alkaline Phosphatase 103 40 - 129 IU/L    GFR Non-African American 15 (L) >60 mL/min/1.73m2    GFR  18 (L) >60 mL/min/1.73m2    Anion Gap 15 4 - 16   Troponin   Result Value Ref Range    Troponin T 0.325 (HH) <0.01 NG/ML   Brain Natriuretic Peptide   Result Value Ref Range Pro-BNP 1,061 (H) <300 PG/ML   Lactic Acid   Result Value Ref Range    Lactate 0.8 0.4 - 2.0 mMOL/L   Urinalysis with Reflex to Culture    Specimen: Urine   Result Value Ref Range    Color, UA YELLOW YELLOW    Clarity, UA CLEAR CLEAR    Glucose, Urine NEGATIVE NEGATIVE MG/DL    Bilirubin Urine SMALL (A) NEGATIVE MG/DL    Ketones, Urine TRACE (A) NEGATIVE MG/DL    Specific Gravity, UA 1.025 1.001 - 1.035    Blood, Urine MODERATE (A) NEGATIVE    pH, Urine 5.5 5.0 - 8.0    Protein, UA TRACE (A) NEGATIVE MG/DL    Urobilinogen, Urine 0.2 0.2 - 1.0 MG/DL    Nitrite Urine, Quantitative NEGATIVE NEGATIVE    Leukocyte Esterase, Urine NEGATIVE NEGATIVE    RBC, UA <1 0 - 6 /HPF    WBC, UA <1 0 - 5 /HPF    Bacteria, UA NEGATIVE NEGATIVE /HPF    Squam Epithel, UA 2 /HPF    Trichomonas, UA NONE SEEN NONE SEEN /HPF   POCT Glucose   Result Value Ref Range    POC Glucose 107 (H) 70 - 99 MG/DL   POCT Glucose   Result Value Ref Range    POC Glucose 104 (H) 70 - 99 MG/DL       Radiographs:  XR TIBIA FIBULA LEFT (2 VIEWS)    Result Date: 5/31/2022  EXAMINATION: 2 XRAY VIEWS OF THE LEFT TIBIA AND FIBULA 5/31/2022 8:49 pm COMPARISON: None. HISTORY: ORDERING SYSTEM PROVIDED HISTORY: fall, pain TECHNOLOGIST PROVIDED HISTORY: Reason for exam:->fall, pain Reason for Exam: fall, pain FINDINGS: No acute fracture or suspect osseous lesion. Alignment of the knee and ankle appears maintained. Mild-to-moderate degenerative changes are noted involving the medial femorotibial compartment. No focal soft tissue swelling is evident. No acute osseous abnormality of the left tibia or fibula.      CT Head WO Contrast    Result Date: 5/31/2022  EXAMINATION: CT OF THE HEAD WITHOUT CONTRAST  5/31/2022 8:42 pm TECHNIQUE: CT of the head was performed without the administration of intravenous contrast. Automated exposure control, iterative reconstruction, and/or weight based adjustment of the mA/kV was utilized to reduce the radiation dose to as low as reasonably achievable. COMPARISON: None. HISTORY: ORDERING SYSTEM PROVIDED HISTORY: fall/unresponsive earlier in the day TECHNOLOGIST PROVIDED HISTORY: Reason for exam:->fall/unresponsive earlier in the day Has a \"code stroke\" or \"stroke alert\" been called? ->No Decision Support Exception - unselect if not a suspected or confirmed emergency medical condition->Emergency Medical Condition (MA) Reason for Exam: fall/unresponsive earlier in the day Additional signs and symptoms: no Relevant Medical/Surgical History: none FINDINGS: BRAIN/VENTRICLES: There is no acute intracranial hemorrhage, mass effect or midline shift. No abnormal extra-axial fluid collection. The gray-white differentiation is maintained without evidence of an acute infarct. There is no evidence of hydrocephalus. Involutional changes chronic microvascular disease. Vascular calcifications. ORBITS: The visualized portion of the orbits demonstrate no acute abnormality. SINUSES: Mild paranasal sinus mucosal thickening. SOFT TISSUES/SKULL:  No acute abnormality of the visualized skull or soft tissues. No acute intracranial abnormality. CT Cervical Spine WO Contrast    Result Date: 5/31/2022  EXAMINATION: CT OF THE CERVICAL SPINE WITHOUT CONTRAST 5/31/2022 8:44 pm TECHNIQUE: CT of the cervical spine was performed without the administration of intravenous contrast. Multiplanar reformatted images are provided for review. Automated exposure control, iterative reconstruction, and/or weight based adjustment of the mA/kV was utilized to reduce the radiation dose to as low as reasonably achievable. COMPARISON: None.  HISTORY: ORDERING SYSTEM PROVIDED HISTORY: fall earlier today TECHNOLOGIST PROVIDED HISTORY: Reason for exam:->fall earlier today Decision Support Exception - unselect if not a suspected or confirmed emergency medical condition->Emergency Medical Condition (MA) Reason for Exam: fall/unresponsive earlier in the day Additional signs and symptoms: no Relevant Medical/Surgical History: none FINDINGS: BONES/ALIGNMENT: There is no acute fracture or traumatic malalignment. DEGENERATIVE CHANGES: No significant degenerative changes. SOFT TISSUES: There is no prevertebral soft tissue swelling. 1.No acute abnormality of the cervical spine. XR CHEST PORTABLE    Result Date: 5/31/2022  EXAMINATION: ONE XRAY VIEW OF THE CHEST 5/31/2022 8:49 pm COMPARISON: None. HISTORY: ORDERING SYSTEM PROVIDED HISTORY: fall, unresponsiveness TECHNOLOGIST PROVIDED HISTORY: Reason for exam:->fall, unresponsiveness Reason for Exam: fall, unresponsiveness FINDINGS: The cardiomediastinal silhouette is normal in size and contour. The lungs are clear. No pleural effusion or pneumothorax is present. No acute cardiopulmonary process       Procedures/EKG:   Please see attending physician's note for interpretation. ED Course and MDM   -  Patient seen and evaluated in the emergency department. -  Triage and nursing notes reviewed and incorporated. -  Old chart records reviewed and incorporated. -  Supervising physician was Dr. Mario Alberto Peterson. Patient was seen independently. -  Work-up included:  see above  -  ED medications:  NS  -  Results discussed with patient and daughters. Imaging is non-acute. Patient with acute on chronic renal failure, Cr today is 3.1--previous around 2.0.  K bumped to 5.4. Troponin 0.325--I do not have any previous for comparison. Patient adamantly denies any chest pain throughout the day today. I suspect renal cause but will trend. I spoke with Dr. Martin Castillo, hospitalist, who will see and admit. In light of current events, I did utilize appropriate PPE (including N95 and surgical face mask, safety glasses, and gloves, as recommended by the health facility/national standard best practice, during my bedside interactions with the patient. Final Impression      1. General weakness    2.  Acute renal failure superimposed on chronic kidney disease, unspecified CKD stage, unspecified acute renal failure type (HCC)    3. Elevated troponin    4. Fall, initial encounter    5. Left leg pain    6.  Hyperkalemia          DISPOSITION Decision To Admit 06/01/2022 12:57:17 AM      Dori Alexander PA-C   [facility] [place], [place]  06/01/22 0222

## 2022-06-01 NOTE — CONSULTS
Patient:   Abbey Goodpasture    Date:  22  :  1945, 68 y.o. Nephrologist: Awilda Piña MD  Provider: Silvana Ken MD    Reason for Consult: Acute kidney injury and underlying CKD stage IV    Consult requested by : Dr Kala Friend MD    Chief Complaint:   Generalized weakness and recurrent fall with hypoglycemia    HISTORY OF PRESENT ILLNESS:   Ms Manuel Shen is a 27-year-old female, was brought to the emergency room via EMS with generalized weakness recurrent fall hypoglycemia. Apparently patient has had symptoms for several days according to family member and the patient. The day of her ER presentation daughter found her on the floor unresponsive, she was profoundly hypoglycemic at the time and the squad was called. She was given dextrose infusion and became responsive. Ms Manuel Shen  refused to come to the emergency department at that time. Because of her symptomatology, daughter brought her to the ground floor. Later that day, daughter found her trying to crawl up the stairs. As her symptoms did not improve and rather worsen, daughter decided to call the EMS again. She was subsequently brought to the emergency department    Upon arrival in the emergency department she was hemodynamically stable and afebrile. She was able to maintain her oxygenation on ambient air. She did undergo several diagnostic tests which include imaging and biochemical.  Imaging study which she had chest x-ray, CT of the cervical spine without contrast, CT head was unrevealing. Biochemical testing showed increased creatinine of 3.1, and BUN of 82, with a low serum bicarbonate of 10. Along with potassium of 5.4. Her most recent creatinine was 2.0. Additional abnormal lab include high troponin of 0.325, high proBNP level more than 1000, slightly high WBC count of 11.2 but very high hemoglobin of 17.4, with high MCV of 102.0. Her urine analysis did not have any active sediment or albuminuria.   She was given 1 L of crystalloid solution mainly normal saline. Also D5 with sodium bicarbonate containing solution was added which was slightly hypertonic. When I saw her in the morning, she is undergoing echocardiogram.  She was alert awake but not fully oriented. She does not seem to be any respiratory distress or any other distress. I have familiar with her, I have been doing her since 2018. I met her at that time for evaluation of CKD stage IV with creatinine of 1.8. Her main etiology was thought to be hypertension, diabetes mellitus, dyslipidemia etc. I was trying to slow down the kidney disease progression with renin-angiotensin aldosterone blocker,  Control of diabetes, blood pressure and cholesterol along with diet and lifestyle. Her creatinine did increase to 2.0. But she did not have any proteinuria. My last encounter with her was in 2021. Her blood pressure was slightly up at that time, she was on daily clonidine, I discontinued it and instead added doxazosin 2 mg a day for better blood pressure control. She was on lisinopril, spironolactone and amlodipine along with chlorthalidone at the time      PMH :   1. HTn  Since age 24   1. DM type  po pilld   3. HLP  4. Hypothyroidism  5. Anxiety d/o   7.  CKD progressive         OB GYn :     miscarriage - 3  No eclampsia , no gestational DM, but HTN        Habits :     PPD- 76 PYH  etoh- none   No illicit drugs     Soc hx :     Born [address] [place] in [address] [facility],[address], [facility] -    once -     5 yrs   Lives alone   Active  Worked in childcare   Last worked 8 yrs ago   Has 4 children , all are in [facility] they does not support her or visits her per pt      [address] :      No CKD/ESRD  Son HTN                   REVIEW OF SYSTEMS:     All pertinent ROS neg except   Generalized weakness, poor oral intake, recurrent fall, hypoglycemia    Current Facility-Administered Medications   Medication Dose Route Frequency Provider Last Rate Last Admin    sodium chloride flush 0.9 % injection 5-40 mL  5-40 mL IntraVENous 2 times per day Arturo Mcpherson MD        sodium chloride flush 0.9 % injection 5-40 mL  5-40 mL IntraVENous PRN Arturo Mcpherson MD        0.9 % sodium chloride infusion   IntraVENous PRN Arturo Mcpherson MD        ondansetron (ZOFRAN-ODT) disintegrating tablet 4 mg  4 mg Oral Q8H PRN Arturo Mcpherson MD        Or    ondansetron (ZOFRAN) injection 4 mg  4 mg IntraVENous Q6H PRN Arturo Mcpherson MD        polyethylene glycol (GLYCOLAX) packet 17 g  17 g Oral Daily PRN Arturo Mcpherson MD        acetaminophen (TYLENOL) tablet 650 mg  650 mg Oral Q6H PRN Arturo Mcpherson MD        Or   Son Mooney acetaminophen (TYLENOL) suppository 650 mg  650 mg Rectal Q6H PRN Arturo Mcpherson MD        glucose chewable tablet 16 g  4 tablet Oral PRN Arturo Mcpherson MD        dextrose bolus 10% 125 mL  125 mL IntraVENous PRN Arturo Mcpherson MD        Or    dextrose bolus 10% 250 mL  250 mL IntraVENous PRN Arturo Mcpherson MD        glucagon (rDNA) injection 1 mg  1 mg IntraMUSCular PRN Arturo Mcpherson MD        dextrose 5 % solution  100 mL/hr IntraVENous PRN Arturo Mcpherson MD        heparin (porcine) injection 5,000 Units  5,000 Units SubCUTAneous TID Arturo Mcpherson MD        sodium bicarbonate 100 mEq in dextrose 5 % 1,000 mL infusion   IntraVENous Continuous Arturo Mcpherson  mL/hr at 06/01/22 0536 New Bag at 06/01/22 0536    amLODIPine (NORVASC) tablet 10 mg  10 mg Oral Daily Arturo Mcpherson MD        atorvastatin (LIPITOR) tablet 40 mg  40 mg Oral Daily Arturo Mcpherson MD        busPIRone (BUSPAR) tablet 15 mg  15 mg Oral BID Arturo Mcpherson MD        levothyroxine (SYNTHROID) tablet 88 mcg  88 mcg Oral Daily Arturo Mcpherson MD        LORazepam (ATIVAN) tablet 1 mg  1 mg Oral Q8H PRN Arturo Mcpherson MD        metoprolol tartrate (LOPRESSOR) tablet 50 mg  50 mg Oral BID Александр Poe MD        sertraline (ZOLOFT) tablet 200 mg  200 mg Oral Daily Ezequiel Teague MD           BP (!) 142/92   Pulse 82   Temp 97.1 °F (36.2 °C) (Oral)   Resp 18   Ht 5' 5\" (1.651 m)   Wt 200 lb (90.7 kg)   SpO2 95%   BMI 33.28 kg/m²     PHYSICAL EXAM:  General appearance: Alert, awake but not fully oriented  HEENT: Positive conjunctival pallor  Neck: Supple  Heart: Seems regular rate and rhythm  LUNGS: Limited examination he is undergoing echocardiogram and darkroom, but no gross crackles  Abdomen: Soft, minimally tender on palpation  Extremities: Very minimal trace bilateral leg edema    LABS:  CBC:   Lab Results   Component Value Date    WBC 11.2 05/31/2022    WBC 5.9 04/22/2022    WBC 6.5 09/27/2021    HGB 17.4 05/31/2022    HGB 16.0 04/22/2022    HGB 16.9 09/27/2021     05/31/2022     04/22/2022     09/27/2021     Renal Panel:   Lab Results   Component Value Date     05/31/2022     04/22/2022     12/02/2021    K 5.4 05/31/2022    K 5.0 04/22/2022    K 4.6 12/02/2021     05/31/2022     04/22/2022     12/02/2021    CO2 10 05/31/2022    CO2 21 04/22/2022    CO2 23 12/02/2021    BUN 82 05/31/2022    BUN 43 04/22/2022    BUN 47 12/02/2021    CREATININE 3.1 05/31/2022    CREATININE 2.0 04/22/2022    CREATININE 1.9 12/02/2021    GFRAA 18 05/31/2022    GFRAA 29 04/22/2022    GFRAA 31 12/02/2021    LABGLOM 15 05/31/2022    LABGLOM 24 04/22/2022    LABGLOM 26 12/02/2021    LABALBU 3.7 05/31/2022    LABALBU 4.2 04/22/2022    LABALBU 4.0 12/02/2021         Calcium:    Lab Results   Component Value Date    CALCIUM 8.7 05/31/2022     Phosphorus:    Lab Results   Component Value Date    PHOS 3.6 03/15/2018       U/A:    Lab Results   Component Value Date    PROTEINU TRACE 05/31/2022    NITRU NEGATIVE 05/31/2022    NITRU Negative 03/15/2018    COLORU YELLOW 05/31/2022    PHUR 6.0 03/15/2018    45 Sharonda Dodson <1 05/31/2022    RBCUA <1 05/31/2022    TRICHOMONAS NONE SEEN 05/31/2022    BACTERIA NEGATIVE 05/31/2022    CLARITYU CLEAR 05/31/2022    SPECGRAV 1.025 05/31/2022    UROBILINOGEN 0.2 05/31/2022    BILIRUBINUR SMALL 05/31/2022    BLOODU MODERATE 05/31/2022    GLUCOSEU Negative 03/15/2018    KETUA TRACE 05/31/2022           IMPRESSION:  1. Acute kidney injury-with underlying CKD stage IV A1-we will make sure she does not have any acute bladder obstruction by doing bladder scan-her urine is rather bland. Potential etiology relative volume depletion, perhaps accentuated by home renin-angiotensin aldosterone blocker. Severe metabolic acidosis after raising suspicion for starvation ketosis. We will get a beta hydroxybutyrate level, but she is getting D5, that would lower the ketone production. Other etiologies less likely-including rhabdo but I will check a CK level just in case  2. Hyperkalemia likely from acute kidney injury with underlying spironolactone and ACE inhibitor's  3. Generalized weakness, partly could be from acute renal failure and volume depletion, will make sure there is no other acute or chronic process  4. Underlying diabetes, hypertension which is rather resistant along with multiple chronic condition and perhaps dementia    PLAN:    1. Add a beta hydroxybutyrate level, as well as CK level-I did call the lab they will not be able to add it, I will draw the labs stat-I expect the beta hydroxybutyrate level to be high  2. May switch to plain D5 water-until the beta hydroxy level is back  3. Bladder scan  4. Rule out any occult infection inflammation etc.  5. Vitamin D level which can cause generalized weakness  6.  Follow clinically and biochemically

## 2022-06-01 NOTE — H&P
History and Physical      Name:  Radha Arnold /Age/Sex: 1945  (68 y.o. female)   MRN & CSN:  [id number] & [id number] Encounter Date/Time: 2022 2:25 AM EDT   Location:  [facility] PCP: Humera Aldana MD       Hospital Day: 2    Assessment and Plan:     #. RICH on CKD  -Creatinine 2-2022, 3.1 today  -Continue IV fluids and repeat BMP  -Consult nephrology-Dr. Meehan Noss    #.  Mild hyperkalemia    #. Detectable troponin  -Patient denying any chest pain, shortness of breath, no history of CAD  -Could be secondary to worsening renal function  -Serial troponins, repeat EKG  -2D echo ordered  -Consult cardiology    #. Non-anion gap metabolic acidosis could be secondary to worsening renal function  -Continue bicarb drip    #. Hypoglycemia  -As per the information patient had an episode of unresponsiveness due to hypoglycemia  -Patient's blood sugar was 33. Resolved after D10.  -Monitor closely  -Hypoglycemia protocol in place. #.  Fall  -CT head, CT C-spine-no acute process  -X-ray-left [id number] acute fractures  -PT/OT evaluation. #.  Dizziness  -Patient admits to having dizziness since last Thursday, unsteady gait  -CT head-no acute process  -MRI head ordered to evaluate for CVA.  -PT/OT evaluation. #.  Hypertension  -Patient is on metoprolol, lisinopril, Norvasc, doxazosin, spironolactone, chlorthalidone  -Hold lisinopril, spironolactone, chlorthalidone, doxazosin    #. Diabetes mellitus type 2, not on long-term insulin  -Patient is on glimepiride, pioglitazone, Farxiga-hold  -Hypoglycemia protocol ordered  -HbA1c-six-point 2-2020    #. Hyperlipidemia  -Patient is on atorvastatin    #. Hypothyroidism-continue levothyroxine    #. Depression/anxiety/agoraphobia  -Patient is on buspirone, lorazepam, sertraline    #. CKD stage IV    #.   Chronic tobacco dependence  Disposition:   Current Living situation: home with son    Diet  renal   DVT Prophylaxis [] Lovenox, [x]  Heparin, [] SCDs, [] Ambulation,  [] Eliquis, [] Xarelto   Code Status No Order   Surrogate Decision Maker/ POA      History from:   EMR, patient, patient's daughter at bedside. History of Present Illness:     Chief Complaint: Generalized weakness  Yin Anthony is a 68 y.o. female with hypertension, hyperlipidemia, hypothyroidism, diabetes mellitus type 2, not on long-term insulin, depression/anxiety, CKD stage IV, chronic tobacco dependence was brought to ED by EMS after the patient had a fall at home. Most of the information was provided by patient's daughter at bedside. Patient's daughter went to visit the patient and found her unresponsive. She reported that patient was awake but not answering any questions. She called the squad. Patient was noted to be hypoglycemic with blood sugar 33. Patient was given D10 and advised ED transfer, but patient declined. Later patient had a fall while in the bathroom. Patient's daughter was with the patient during the time of incident. At she reported that patient was very weak, feeling dizzy, fell down and twisted her left leg. They denied patient hitting her head or losing consciousness. Patient and her daughter also reported that patient was feeling dizzy since Thursday. Unable to give more details. Patient reported having difficulty with ambulation (feeling unsteady while walking), visual hallucinations. Patient denied any headache, visual disturbance, nausea, vomiting, fever, chills, cough, chest pain, shortness of breath, denied any abdominal pain, denied any urinary complaints, denied any constipation or diarrhea. At presentation patient was noted to have /52, HR 69, RR 20, temp 99, saturating 99% on room air. Lab work significant for sodium 132, potassium 5.4, CO2 10, BUN 82, creatinine 3.1, lactic acid 0.8, random glucose 107, proBNP 1061, troponin 0.325, WBC 11.2, hemoglobin 17.4, platelets 586. UA-not suggestive of infection.   CT head, CT C-spine-no acute process. Chest x-ray-no acute process. X-ray tibia/fibula-no acute fracture. Patient received 1 L NS bolus in ED. Review of Systems: Need 10 Elements   10 point review of systems conducted and pertinent positives and negatives as per HPI. Objective:   No intake or output data in the 24 hours ending 06/01/22 0225   Vitals:   Vitals:    05/31/22 2145 05/31/22 2200 05/31/22 2230 05/31/22 2245   BP: 135/63 (!) 145/79 (!) 141/101 (!) 136/100   Pulse: 69 73 73 97   Resp: 19 25 21 18   Temp:       TempSrc:       SpO2: 98% 98% 98% 99%   Weight:       Height:           Medications Prior to Admission   Reviewed medications with patient    Prior to Admission medications    Medication Sig Start Date End Date Taking?  Authorizing Provider   amLODIPine (NORVASC) 10 MG tablet Take 1 tablet by mouth daily 5/13/22   Mason Bajwa MD   chlorthalidone (HYGROTON) 25 MG tablet TAKE ONE TABLET BY MOUTH DAILY 5/13/22   Mason Bajwa MD   glimepiride (AMARYL) 4 MG tablet Take 1 tablet by mouth in the morning and at bedtime 4/21/22   Mason Bajwa MD   metoprolol tartrate (LOPRESSOR) 50 MG tablet TAKE ONE TABLET BY MOUTH TWICE A DAY 4/5/22   Mason Bajwa MD   atorvastatin (LIPITOR) 40 MG tablet TAKE ONE TABLET BY MOUTH DAILY 3/16/22   Mason Bajwa MD   spironolactone (ALDACTONE) 100 MG tablet Take 1 tablet by mouth daily 3/16/22   Jesse Garcia MD   lisinopril (PRINIVIL;ZESTRIL) 20 MG tablet Take 1 tablet by mouth 2 times daily 12/29/21   Mason Bajwa MD   levothyroxine (SYNTHROID) 88 MCG tablet Take 1 tablet by mouth Daily 12/29/21   Mason Bajwa MD   dapagliflozin Chelle Tomasz) 5 MG tablet Take 1 tablet by mouth every morning 12/29/21   Mason Bajwa MD   doxazosin (CARDURA) 2 MG tablet Take 1 tablet by mouth daily 12/7/21   Jesse Garcia MD   pioglitazone (ACTOS) 30 MG tablet Take 1 tablet by mouth daily 9/27/21   Mason Bajwa MD   busPIRone (BUSPAR) 15 MG tablet Take 15 mg by mouth 3 times daily  Patient taking differently: Take 15 mg by mouth 2 times daily  9/28/20   Giovanna Swift MD   LORazepam (ATIVAN) 1 MG tablet Take 1 mg by mouth every 8 hours as needed for Anxiety. Historical Provider, MD   sertraline (ZOLOFT) 25 MG tablet Take 200 mg by mouth daily     Historical Provider, MD       Physical Exam: Need 8 Elements   Physical Exam     GEN  -Awake, alert, NAD.   EYES   -PERRL. HENT  -MM are moist.   RESP  -LS CTA equal bilat, no wheezes, rales or rhonchi. Symmetric chest movement. No respiratory distress noted. C/V  -S1/S2 auscultated. RRR without appreciable M/R/G. No peripheral edema. No reproducible chest wall tenderness. GI  -Abdomen is soft, non-distended, no significant tenderness. No masses or guarding. + BS in all quadrants. Rectal exam deferred.   -No CVA tenderness. Summers catheter is not present. MS  -B/L extremities strong muscles strength. Full movements. No gross joint deformities. No swelling, intact sensation symmetrical.   SKIN  -Normal coloration, warm, dry. NEURO  - Awake, alert, oriented x 3, no focal deficits. PSYC  - Appropriate affect. Past Medical History:   Reviewed patient's past medical, surgical, social, family history and allergies. PMHx   Past Medical History:   Diagnosis Date    Acute kidney failure ([facility] [address] 2/16/2018    Chronic kidney disease     Hypertension     Hypothyroidism     Type 2 diabetes mellitus without complication ([facility] [address]      PSHX:  has no past surgical history on file. Allergies: Allergies   Allergen Reactions    Seasonal      Fam HX: family history includes Cancer in her brother and paternal grandmother; Diabetes in her father; High Blood Pressure in her father; Kidney Disease in her father; No Known Problems in her brother; Other in her mother; Stroke in her maternal grandmother.   Soc HX:   Social History     Socioeconomic History    Marital status:      Spouse name: None    Number of children: None    Years of education: None    Highest education level: None   Occupational History    None   Tobacco Use    Smoking status: Current Every Day Smoker     Packs/day: 1.00     Years: 59.00     Pack years: 59.00     Types: Cigarettes     Start date: 1962    Smokeless tobacco: Never Used   Vaping Use    Vaping Use: Never used   Substance and Sexual Activity    Alcohol use: No    Drug use: None    Sexual activity: None   Other Topics Concern    None   Social History Narrative    None     Social Determinants of Health     Financial Resource Strain: Low Risk     Difficulty of Paying Living Expenses: Not very hard   Food Insecurity: No Food Insecurity    Worried About Running Out of Food in the Last Year: Never true    Imelda of Food in the Last Year: Never true   Transportation Needs:     Lack of Transportation (Medical): Not on file    Lack of Transportation (Non-Medical):  Not on file   Physical Activity:     Days of Exercise per Week: Not on file    Minutes of Exercise per Session: Not on file   Stress:     Feeling of Stress : Not on file   Social Connections:     Frequency of Communication with Friends and Family: Not on file    Frequency of Social Gatherings with Friends and Family: Not on file    Attends Hoahaoism Services: Not on file    Active Member of [address] [place] [address] or Organizations: Not on file    Attends Club or Organization Meetings: Not on file    Marital Status: Not on file   Intimate Partner Violence:     Fear of Current or Ex-Partner: Not on file    Emotionally Abused: Not on file    Physically Abused: Not on file    Sexually Abused: Not on file   Housing Stability:     Unable to Pay for Housing in the Last Year: Not on file    Number of [place] in the Last Year: Not on file    Unstable Housing in the Last Year: Not on file       Medications:   Medications:    Infusions:   PRN Meds:     Labs      CBC:   Recent Labs     05/31/22  2141   WBC 11.2*   HGB 17.4*        BMP:    Recent Labs 05/31/22 2141   *   K 5.4*      CO2 10*   BUN 82*   CREATININE 3.1*   GLUCOSE 107*     Hepatic:   Recent Labs     05/31/22 2141   AST 51*   ALT 23   BILITOT 0.2   ALKPHOS 103     Lipids:   Lab Results   Component Value Date    CHOL 108 04/22/2022    HDL 35 04/22/2022    TRIG 99 04/22/2022     Hemoglobin A1C:   Lab Results   Component Value Date    LABA1C 6.2 04/22/2022     TSH:   Lab Results   Component Value Date    TSH 4.74 04/22/2022     Troponin:   Lab Results   Component Value Date    TROPONINT 0.325 05/31/2022     Lactic Acid: No results for input(s): LACTA in the last 72 hours. BNP:   Recent Labs     05/31/22 2141   PROBNP 1,061*     UA:  Lab Results   Component Value Date    NITRU NEGATIVE 05/31/2022    NITRU Negative 03/15/2018    COLORU YELLOW 05/31/2022    PHUR 6.0 03/15/2018    WBCUA <1 05/31/2022    RBCUA <1 05/31/2022    TRICHOMONAS NONE SEEN 05/31/2022    BACTERIA NEGATIVE 05/31/2022    CLARITYU CLEAR 05/31/2022    SPECGRAV 1.025 05/31/2022    LEUKOCYTESUR NEGATIVE 05/31/2022    UROBILINOGEN 0.2 05/31/2022    BILIRUBINUR SMALL 05/31/2022    BLOODU MODERATE 05/31/2022    GLUCOSEU Negative 03/15/2018    KETUA TRACE 05/31/2022     Urine Cultures: No results found for: [place]  Blood Cultures: No results found for: BC  No results found for: BLOODCULT2  Organism: No results found for: ORG    Imaging/Diagnostics Last 24 Hours   XR TIBIA FIBULA LEFT (2 VIEWS)    Result Date: 5/31/2022  EXAMINATION: 2 XRAY VIEWS OF THE LEFT TIBIA AND FIBULA 5/31/2022 8:49 pm COMPARISON: None. HISTORY: ORDERING SYSTEM PROVIDED HISTORY: fall, pain TECHNOLOGIST PROVIDED HISTORY: Reason for exam:->fall, pain Reason for Exam: fall, pain FINDINGS: No acute fracture or suspect osseous lesion. Alignment of the knee and ankle appears maintained. Mild-to-moderate degenerative changes are noted involving the medial femorotibial compartment. No focal soft tissue swelling is evident.      No acute osseous abnormality of the left tibia or fibula. CT Head WO Contrast    Result Date: 5/31/2022  EXAMINATION: CT OF THE HEAD WITHOUT CONTRAST  5/31/2022 8:42 pm TECHNIQUE: CT of the head was performed without the administration of intravenous contrast. Automated exposure control, iterative reconstruction, and/or weight based adjustment of the mA/kV was utilized to reduce the radiation dose to as low as reasonably achievable. COMPARISON: None. HISTORY: ORDERING SYSTEM PROVIDED HISTORY: fall/unresponsive earlier in the day TECHNOLOGIST PROVIDED HISTORY: Reason for exam:->fall/unresponsive earlier in the day Has a \"code stroke\" or \"stroke alert\" been called? ->No Decision Support Exception - unselect if not a suspected or confirmed emergency medical condition->Emergency Medical Condition (MA) Reason for Exam: fall/unresponsive earlier in the day Additional signs and symptoms: no Relevant Medical/Surgical History: none FINDINGS: BRAIN/VENTRICLES: There is no acute intracranial hemorrhage, mass effect or midline shift. No abnormal extra-axial fluid collection. The gray-white differentiation is maintained without evidence of an acute infarct. There is no evidence of hydrocephalus. Involutional changes chronic microvascular disease. Vascular calcifications. ORBITS: The visualized portion of the orbits demonstrate no acute abnormality. SINUSES: Mild paranasal sinus mucosal thickening. SOFT TISSUES/SKULL:  No acute abnormality of the visualized skull or soft tissues. No acute intracranial abnormality. CT Cervical Spine WO Contrast    Result Date: 5/31/2022  EXAMINATION: CT OF THE CERVICAL SPINE WITHOUT CONTRAST 5/31/2022 8:44 pm TECHNIQUE: CT of the cervical spine was performed without the administration of intravenous contrast. Multiplanar reformatted images are provided for review.  Automated exposure control, iterative reconstruction, and/or weight based adjustment of the mA/kV was utilized to reduce the radiation dose to as low as reasonably achievable. COMPARISON: None. HISTORY: ORDERING SYSTEM PROVIDED HISTORY: fall earlier today TECHNOLOGIST PROVIDED HISTORY: Reason for exam:->fall earlier today Decision Support Exception - unselect if not a suspected or confirmed emergency medical condition->Emergency Medical Condition (MA) Reason for Exam: fall/unresponsive earlier in the day Additional signs and symptoms: no Relevant Medical/Surgical History: none FINDINGS: BONES/ALIGNMENT: There is no acute fracture or traumatic malalignment. DEGENERATIVE CHANGES: No significant degenerative changes. SOFT TISSUES: There is no prevertebral soft tissue swelling. 1.No acute abnormality of the cervical spine. XR CHEST PORTABLE    Result Date: 5/31/2022  EXAMINATION: ONE XRAY VIEW OF THE CHEST 5/31/2022 8:49 pm COMPARISON: None. HISTORY: ORDERING SYSTEM PROVIDED HISTORY: fall, unresponsiveness TECHNOLOGIST PROVIDED HISTORY: Reason for exam:->fall, unresponsiveness Reason for Exam: fall, unresponsiveness FINDINGS: The cardiomediastinal silhouette is normal in size and contour. The lungs are clear. No pleural effusion or pneumothorax is present. No acute cardiopulmonary process       Personally reviewed Lab Studies, Imaging, and discussed case with ED provider.     Electronically signed by Concetta Fernandes MD on 6/1/2022 at 2:25 AM

## 2022-06-01 NOTE — PROGRESS NOTES
Nurse entered patient's room. Noticed patient messing with blanket in bed. Patient alert and awake. Patient voiced no concerns. Patient confused about situation. Patient reoriented. Patient pulled telemetry box off leads. New leads replaced and patient placed back onto monitor. 1830 - Nurse entered patient's room to check on patient. Patient awake alert, confused to situation. Patient reoriented. Food found in bed. Dentures taken out and placed on bed by patient. Telemetry box off of patient and found on floor along with ekg wires. Leads replaced and telemetry box turned on. Patient voiced no complaints and was smiling and laughing.

## 2022-06-01 NOTE — PROGRESS NOTES
Patient was seen and examined today. She was admitted after midnight. Refer to H&P for details. During my evaluation, patient remains afebrile with stable vital signs. Continue IV fluids. Nephrology following for acute kidney injury.

## 2022-06-01 NOTE — CONSULTS
CARDIOLOGY CONSULT NOTE   Reason for consultation: ELEVATED TROP    Referring physician:  Concetta Fernandes MD     Primary care physician: Dex Long MD      Dear   Thanks for the consult. History of present illness:Mckenna is a 68 y. o.year old who  presents with generalized weakness and anxiety should been having frequent falls and has low blood sugar in the ER was found to have acute renal failure with a creatinine of 3.1 and cardiac consult is called for troponin of 0.3 which is increased 2.4 now patient denies any chest pain or shortness of breath  Chief Complaint   Patient presents with    Fatigue     weakness all over, frequent falls, low blood sugar     Blood pressure, cholesterol, blood glucose and weight are well controlled. Past medical history:    has a past medical history of Acute kidney failure ([facility] [address] Chronic kidney disease, Hypertension, Hypothyroidism, and Type 2 diabetes mellitus without complication ([facility] [address] Past surgical history:  Social History:   reports that she has been smoking cigarettes. She started smoking about 60 years ago. She has a 59.00 pack-year smoking history. She has never used smokeless tobacco. She reports that she does not drink alcohol.   Family history:   no family history of CAD, STROKE of DM    Allergies   Allergen Reactions    Seasonal        sodium chloride flush 0.9 % injection 5-40 mL, 2 times per day  sodium chloride flush 0.9 % injection 5-40 mL, PRN  0.9 % sodium chloride infusion, PRN  ondansetron (ZOFRAN-ODT) disintegrating tablet 4 mg, Q8H PRN   Or  ondansetron (ZOFRAN) injection 4 mg, Q6H PRN  polyethylene glycol (GLYCOLAX) packet 17 g, Daily PRN  acetaminophen (TYLENOL) tablet 650 mg, Q6H PRN   Or  acetaminophen (TYLENOL) suppository 650 mg, Q6H PRN  glucose chewable tablet 16 g, PRN  dextrose bolus 10% 125 mL, PRN   Or  dextrose bolus 10% 250 mL, PRN  glucagon (rDNA) injection 1 mg, PRN  dextrose 5 % solution, PRN  heparin (porcine) injection 5,000 Units, TID  amLODIPine (NORVASC) tablet 10 mg, Daily  atorvastatin (LIPITOR) tablet 40 mg, Daily  busPIRone (BUSPAR) tablet 15 mg, BID  levothyroxine (SYNTHROID) tablet 88 mcg, Daily  LORazepam (ATIVAN) tablet 1 mg, Q8H PRN  metoprolol tartrate (LOPRESSOR) tablet 50 mg, BID  sertraline (ZOLOFT) tablet 200 mg, Daily  0.9 % sodium chloride infusion, Continuous      Current Facility-Administered Medications   Medication Dose Route Frequency Provider Last Rate Last Admin    sodium chloride flush 0.9 % injection 5-40 mL  5-40 mL IntraVENous 2 times per day Lavonne Camacho MD   10 mL at 06/01/22 1006    sodium chloride flush 0.9 % injection 5-40 mL  5-40 mL IntraVENous PRN Lavonne Camacho MD        0.9 % sodium chloride infusion   IntraVENous PRN Lavonne Camacho MD        ondansetron (ZOFRAN-ODT) disintegrating tablet 4 mg  4 mg Oral Q8H PRN Lavonne Camacho MD        Or    ondansetron (ZOFRAN) injection 4 mg  4 mg IntraVENous Q6H PRN Lavonne Camacho MD        polyethylene glycol (GLYCOLAX) packet 17 g  17 g Oral Daily PRN Lavonne Camacho MD        acetaminophen (TYLENOL) tablet 650 mg  650 mg Oral Q6H PRN Lavonne Camacho MD        Or   Moe acetaminophen (TYLENOL) suppository 650 mg  650 mg Rectal Q6H PRN Lavonne Camacho MD        glucose chewable tablet 16 g  4 tablet Oral PRN Lavonne Camacho MD        dextrose bolus 10% 125 mL  125 mL IntraVENous SRI Camacho MD        Or    dextrose bolus 10% 250 mL  250 mL IntraVENous PRN Lavonne Camacho MD        glucagon (rDNA) injection 1 mg  1 mg IntraMUSCular PRN Lavonne Camacho MD        dextrose 5 % solution  100 mL/hr IntraVENous PRARTEM Camacho MD        heparin (porcine) injection 5,000 Units  5,000 Units SubCUTAneous TID Lavonne Camacho MD   5,000 Units at 06/01/22 0946    amLODIPine (NORVASC) tablet 10 mg  10 mg Oral Daily Gregory Frederick MD   10 mg at 06/01/22 0946    atorvastatin (LIPITOR) tablet 40 mg  40 mg Oral Daily Concetta Fernandes MD   40 mg at 06/01/22 0946    busPIRone (BUSPAR) tablet 15 mg  15 mg Oral BID Concetta Fernandes MD   15 mg at 06/01/22 0946    levothyroxine (SYNTHROID) tablet 88 mcg  88 mcg Oral Daily Concetta Fernandes MD   88 mcg at 06/01/22 0813    LORazepam (ATIVAN) tablet 1 mg  1 mg Oral Q8H PRN Concetta Fernandes MD        metoprolol tartrate (LOPRESSOR) tablet 50 mg  50 mg Oral BID Concetta Fernandes MD   50 mg at 06/01/22 0945    sertraline (ZOLOFT) tablet 200 mg  200 mg Oral Daily Concetta Fernandes MD   200 mg at 06/01/22 0946    0.9 % sodium chloride infusion   IntraVENous Continuous Lisseth Lay  mL/hr at 06/01/22 0953 New Bag at 06/01/22 0953     Review of Systems:   · Constitutional: No Fever or Weight Loss   · Eyes: No Decreased Vision  · ENT: No Headaches, Hearing Loss or Vertigo  · Cardiovascular: No chest pain, dyspnea on exertion, palpitations or loss of consciousness  · Respiratory: No cough or wheezing    · Gastrointestinal: No abdominal pain, appetite loss, blood in stools, constipation, diarrhea or heartburn  · Genitourinary: No dysuria, trouble voiding, or hematuria  · Musculoskeletal:  No gait disturbance, weakness or joint complaints  · Integumentary: No rash or pruritis  · Neurological: No TIA or stroke symptoms  · Psychiatric: No anxiety or depression  · Endocrine: No malaise, fatigue or temperature intolerance  · Hematologic/Lymphatic: No bleeding problems, blood clots or swollen lymph nodes  · Allergic/Immunologic: No nasal congestion or hives  All systems negative except as marked. ·   ·      Physical Examination:    Vitals:    06/01/22 0813   BP: 127/79   Pulse: 70   Resp: 18   Temp: (!) 96.6 °F (35.9 °C)   SpO2: 97%      Wt Readings from Last 3 Encounters:   05/31/22 200 lb (90.7 kg)   04/22/22 199 lb (90.3 kg)   12/29/21 201 lb (91.2 kg)     Body mass index is 33.28 kg/m².     General Appearance: No distress, conversant    Constitutional:  Well developed, Well nourished, No acute distress, Non-toxic appearance. HENT:  Normocephalic, Atraumatic, Bilateral external ears normal, Oropharynx moist, No oral exudates, Nose normal. Neck- Normal range of motion, No tenderness, Supple, No stridor,no apical-carotid delay, no carotid bruit  Eyes:  PERRL, EOMI, Conjunctiva normal, No discharge. Respiratory:  Normal breath sounds, No respiratory distress, No wheezing, No chest tenderness. ,no use of accessory muscles, diaphragm movement is normal  Cardiovascular: (PMI) apex non displaced,no lifts no thrills, no s3,no s4, Normal heart rate, Normal rhythm, No murmurs, No rubs, No gallops. Carotid arteries pulse and amplitude are normal no bruit, no abdominal bruit noted ( normal abdominal aorta ausculation), femoral arteries pulse and amplitude are normal no bruit, pedal pulses are normal  GI:  Bowel sounds normal, Soft, No tenderness, No masses, No pulsatile masses, no hepatosplenomegally, no bruits  : External genitalia appear normal, No masses or lesions. No discharge. No CVA tenderness. Musculoskeletal:  Intact distal pulses, No edema, No tenderness, No cyanosis, No clubbing. Good range of motion in all major joints. No tenderness to palpation or major deformities noted. Back- No tenderness. Integument:  Warm, Dry, No erythema, No rash. Skin: no rash, no ulcers  Lymphatic:  No lymphadenopathy noted. Neurologic:  Alert & oriented x 3, Normal motor function, Normal sensory function, No focal deficits noted. Psychiatric:  Affect normal, Judgment normal, Mood normal.   Lab Review   Recent Labs     05/31/22 2141   WBC 11.2*   HGB 17.4*   HCT 55.0*         Recent Labs     05/31/22 2141   *   K 5.4*      CO2 10*   BUN 82*   CREATININE 3.1*     Recent Labs     05/31/22 2141   AST 51*   ALT 23   BILITOT 0.2   ALKPHOS 103     No results for input(s): TROPONINI in the last 72 hours.   No results found for: BNP  No results found for: INR, PROTIME      EKG:nsr    Chest Xray:NAD    ECHO:pending  Labs, echo, meds reviewed  Assessment: 68 y. o.year old with PMH of  has a past medical history of Acute kidney failure ([facility] [address] Chronic kidney disease, Hypertension, Hypothyroidism, and Type 2 diabetes mellitus without complication ([facility] [address] Recommendations:    1. Elevated troponin, most likely from acute renal failure will get echo and a stress test cannot do heart cath today because of elevated creatinine  2. Anxiety not well controlled  3. Falls recommend to have PT evaluation  4. Dizziness will recommend orthostatic vitals  5. Diabetes continue glipizide Actos  6. Dyslipidemia continue statins  7. Hypothyroidism continue Synthroid  8. Acute on chronic renal failure as per nephrology  9. Health maintenance: exerise and diet  All labs, medications and tests reviewed, continue all other medications of all above medical condition listed as is.          Sandy Aggarwal MD, 6/1/2022 11:39 AM

## 2022-06-01 NOTE — CONSULTS
[address] [address] Continence Nurse  Consult Note       Anastasia Munoz  AGE: 68 y.o. GENDER: female  : 1945  TODAY'S DATE:  2022    Subjective:     Reason for  Evaluation and Assessment: wound care evalDelvin Munoz is a 68 y.o. female referred by:   [x] Physician  [] Nursing  [] Other:     Wound Identification:  Wound Type: moisture   Contributing Factors: diabetes        PAST MEDICAL HISTORY        Diagnosis Date    Acute kidney failure ([facility] 2018    Chronic kidney disease     Hypertension     Hypothyroidism     Type 2 diabetes mellitus without complication ([facility]        PAST SURGICAL HISTORY    History reviewed. No pertinent surgical history. FAMILY HISTORY    Family History   Problem Relation Age of Onset    Diabetes Father     High Blood Pressure Father     Kidney Disease Father     Cancer Brother         unknown origin    Stroke Maternal Grandmother     Cancer Paternal Grandmother     Other Mother         hysterectomy    No Known Problems Brother        SOCIAL HISTORY    Social History     Tobacco Use    Smoking status: Current Every Day Smoker     Packs/day: 1.00     Years: 59.00     Pack years: 59.00     Types: Cigarettes     Start date:     Smokeless tobacco: Never Used   Vaping Use    Vaping Use: Never used   Substance Use Topics    Alcohol use: No    Drug use: Not on file       ALLERGIES    Allergies   Allergen Reactions    Seasonal        MEDICATIONS    No current facility-administered medications on file prior to encounter.      Current Outpatient Medications on File Prior to Encounter   Medication Sig Dispense Refill    amLODIPine (NORVASC) 10 MG tablet Take 1 tablet by mouth daily 30 tablet 3    chlorthalidone (HYGROTON) 25 MG tablet TAKE ONE TABLET BY MOUTH DAILY 30 tablet 3    glimepiride (AMARYL) 4 MG tablet Take 1 tablet by mouth in the morning and at bedtime 60 tablet 2    metoprolol tartrate (LOPRESSOR) 50 MG tablet TAKE ONE TABLET BY MOUTH TWICE A DAY 60 tablet 3    atorvastatin (LIPITOR) 40 MG tablet TAKE ONE TABLET BY MOUTH DAILY 90 tablet 1    spironolactone (ALDACTONE) 100 MG tablet Take 1 tablet by mouth daily 30 tablet 3    lisinopril (PRINIVIL;ZESTRIL) 20 MG tablet Take 1 tablet by mouth 2 times daily 60 tablet 3    levothyroxine (SYNTHROID) 88 MCG tablet Take 1 tablet by mouth Daily 30 tablet 3    dapagliflozin (FARXIGA) 5 MG tablet Take 1 tablet by mouth every morning 30 tablet 5    doxazosin (CARDURA) 2 MG tablet Take 1 tablet by mouth daily 30 tablet 3    pioglitazone (ACTOS) 30 MG tablet Take 1 tablet by mouth daily 30 tablet 3    busPIRone (BUSPAR) 15 MG tablet Take 15 mg by mouth 3 times daily (Patient taking differently: Take 15 mg by mouth 2 times daily ) 90 tablet 0    LORazepam (ATIVAN) 1 MG tablet Take 1 mg by mouth every 8 hours as needed for Anxiety.        sertraline (ZOLOFT) 25 MG tablet Take 200 mg by mouth daily            Objective:      /79   Pulse 70   Temp (!) 96.6 °F (35.9 °C) (Oral)   Resp 18   Ht 5' 5\" (1.651 m)   Wt 200 lb (90.7 kg)   SpO2 97%   BMI 33.28 kg/m²   Savage Risk Score: Savage Scale Score: 17    LABS    CBC:   Lab Results   Component Value Date    WBC 11.2 05/31/2022    RBC 5.39 05/31/2022    HGB 17.4 05/31/2022    HCT 55.0 05/31/2022    .0 05/31/2022    MCH 32.3 05/31/2022    MCHC 31.6 05/31/2022    RDW 14.9 05/31/2022     05/31/2022    MPV 10.7 05/31/2022     CMP:    Lab Results   Component Value Date     05/31/2022    K 5.4 05/31/2022     05/31/2022    CO2 10 05/31/2022    BUN 82 05/31/2022    CREATININE 3.1 05/31/2022    GFRAA 18 05/31/2022    AGRATIO 1.8 04/22/2022    LABGLOM 15 05/31/2022    GLUCOSE 107 05/31/2022    PROT 6.7 05/31/2022    LABALBU 3.7 05/31/2022    CALCIUM 8.7 05/31/2022    BILITOT 0.2 05/31/2022    ALKPHOS 103 05/31/2022    AST 51 05/31/2022    ALT 23 05/31/2022     Albumin:    Lab Results   Component Value Date    LABALBU 3.7 05/31/2022     PT/INR:  No results found for: PROTIME, INR  HgBA1c:    Lab Results   Component Value Date    LABA1C 6.2 04/22/2022         Assessment:     Patient Active Problem List   Diagnosis    Stage 4 chronic kidney disease (HCC)    Type 2 diabetes mellitus with stage 4 chronic kidney disease, without long-term current use of insulin (HCC)    HTN (hypertension)    Hyperlipidemia    Hypothyroidism    Anxiety disorder    Tobacco abuse    Chronic depression    RLS (restless legs syndrome)    Arthritis of both knees    Generalized weakness       Measurements:  Wound 06/01/22 Groin Left cluster (Active)   Wound Image   06/01/22 1030   Wound Etiology Other 06/01/22 1030   Dressing Status Reinforced dressing 06/01/22 1030   Wound Cleansed Cleansed with saline 06/01/22 1030   Dressing/Treatment Interdry Ag/wicking fabric with Ag 06/01/22 1030   Wound Length (cm) 2 cm 06/01/22 1030   Wound Width (cm) 4 cm 06/01/22 1030   Wound Depth (cm) 0.1 cm 06/01/22 1030   Wound Surface Area (cm^2) 8 cm^2 06/01/22 1030   Wound Volume (cm^3) 0.8 cm^3 06/01/22 1030   Distance Tunneling (cm) 0 cm 06/01/22 1030   Tunneling Position ___ O'Clock 0 06/01/22 1030   Undermining Starts ___ O'Clock 0 06/01/22 1030   Undermining Ends___ O'Clock 0 06/01/22 1030   Undermining Maxium Distance (cm) 0 06/01/22 1030   Wound Assessment Pink/red 06/01/22 1030   Drainage Amount Large 06/01/22 1030   Drainage Description Serosanguinous; Yellow 06/01/22 1030   Odor None 06/01/22 1030   Aminah-wound Assessment Excoriated 06/01/22 1030   Margins Defined edges 06/01/22 1030   Wound Thickness Description not for Pressure Injury Partial thickness 06/01/22 1030   Number of days: 0       Response to treatment:  Well tolerated by patient.      Pain Assessment:  Severity:  none  Quality of pain:   Wound Pain Timing/Severity:   Premedicated: no    Plan:     Plan of Care: Wound 06/01/22 Groin Left cluster-Dressing/Treatment: Interdry Ag/wicking fabric with Ag Patient in bed agreeable to wound care eval appears to be confused. Pt has a left groin wound and very excoriated. Cleansed with NS, measured and pictured, reinforced DriGO wicking cloth. Recommend micotin with wicking cloth. Buttocks with red from excoriation, applied moisture barrier cream. Heels intact and floated. Pt turned to rt side with pillow support. Atmos placed on bed but there is no connection for the mattress. Pt is at mild risk for skin breakdown AEB eden. Follow eden orders. Specialty Bed Required :  yes  [] Low Air Loss   [x] Pressure Redistribution  [] Fluid Immersion  [] Bariatric  [] Total Pressure Relief  [] Other:     Discharge Plan:  Placement for patient upon discharge: tbd  Hospice Care: no  Patient appropriate for Outpatient [address]:  [facility]    Patient/Caregiver Teaching:  Level of patient/caregiver understanding able to:   Pt voiced understanding. Electronically signed by Pierce Cruz.  GREGG Cedillo,  on 6/1/2022 at 1:01 PM

## 2022-06-01 NOTE — PROGRESS NOTES
Occupational Therapy  Facility/Department: [facility]  Occupational Therapy Initial Assessment    Name: Izzy Munoz  : 1945  MRN: [id number]  Date of Service: 2022    Discharge Recommendations: Recommend SNF        Patient Diagnosis(es): The primary encounter diagnosis was General weakness. Diagnoses of Acute renal failure superimposed on chronic kidney disease, unspecified CKD stage, unspecified acute renal failure type ([address] Elevated troponin, Fall, initial encounter, Left leg pain, and Hyperkalemia were also pertinent to this visit. Past Medical History:  has a past medical history of Acute kidney failure ([address] Chronic kidney disease, Hypertension, Hypothyroidism, and Type 2 diabetes mellitus without complication ([facility] [address] Past Surgical History:  has no past surgical history on file. Assessment   Performance deficits / Impairments: Decreased functional mobility ; Decreased safe awareness;Decreased balance;Decreased coordination;Decreased ADL status; Decreased cognition;Decreased endurance;Decreased high-level IADLs;Decreased strength  Prognosis: Fair  Activity Tolerance  Activity Tolerance: Treatment limited secondary to decreased cognition      Pt found semifowlers position in bed and shortly after session began, her son, Dorcas Erwin, was present for the rest of the session to fill in gaps for pt hx. Pt is disoriented and presented with confusion. Pt repeated physical address upon requesting birth date. Pt is a poor historian and interview questions were answered by son, Dorcas Erwin. Pt presented with generalized weakness, decreased endurance, and decreased cognition compared to baseline per son and observation. Pt continues to benefit from OT services to address treatment recommendations below.      Plan   Plan  Times per Week: 3x  Times per Day: Daily  Current Treatment Recommendations: Strengthening,Balance training,Functional mobility training,Endurance training,Gait training,Stair training,Cognitive reorientation,Safety education & training,Patient/Caregiver education & training,Equipment evaluation, education, & procurement,Self-Care / ADL,Coordination training     Restrictions  High fall risk     Subjective      Pain: Declined   Pt stated: \"well I am probably in a library\"   Communication with other providers: Co-eval with Sofie Hidalgo, PT, for pt safety and tolerance     Social/Functional History  Social/Functional History  Lives With: Son,Daughter  Type of Home: House  Home Layout: Two level  Home Access: Stairs to enter with rails  Entrance Stairs - Number of Steps: 1  Entrance Stairs - Rails: None  Bathroom Shower/Tub: Tub/Shower unit  Bathroom Toilet: Standard  Bathroom Equipment: Shower chair  Has the patient had two or more falls in the past year or any fall with injury in the past year?: Yes  Receives Help From: Family  ADL Assistance: Independent  Homemaking Assistance: Needs assistance  Homemaking Responsibilities: No  Ambulation Assistance: Independent  Transfer Assistance: Independent  Active : Yes  Mode of Transportation: Car  Occupation: Retired  Leisure & Hobbies: scratch lottery, coloring  IADL Comments: Receives help from son: Gael       Objective   Heart Rate: 79  Heart Rate Source: Monitor  BP: 127/79  BP Location: Left upper arm  BP Method: Automatic  Patient Position: Semi fowlers  MAP (Calculated): 95  Resp: 18  SpO2: 97 %  O2 Device: None (Room air)  Hearing: Exceptions to [facility]  Hearing Exceptions: Hard of hearing/hearing concerns       Observation/Palpation  Posture: Fair  Observation: Pt found semifowlers in bed  Safety Devices  Type of Devices: Bed alarm in place;Gait belt;Patient at risk for falls; Left in bed  Balance  Sitting: High guard  Standing: High guard     AROM: Generally decreased, functional  Strength: Generally decreased, functional  Coordination: Generally decreased, functional  Tone: Normal  Sensation: Intact  ADL  Grooming:  (Declined grooming)  Toileting: Contact guard assistance; Moderate assistance (Pt donned L sock and required assistance to don R sock)        Bed mobility  Rolling to Left: Minimal assistance (cues for sequencing)  Supine to Sit: Maximum assistance  Sit to Supine: Maximum assistance  Scooting: Contact guard assistance  Transfers  Sit to stand: Moderate assistance (First trial CGA without 2WW. Second attempt to stand with 2WW failed. Third attempt successful with 2WW ModA)  Stand to sit: Moderate assistance (2WW)     Cognition  Overall Cognitive Status: Exceptions  Arousal/Alertness: Delayed responses to stimuli  Following Commands: Follows one step commands with repetition  Attention Span: Attends with cues to redirect  Memory: Decreased recall of biographical Information;Decreased recall of recent events;Decreased short term memory  Safety Judgement: Decreased awareness of need for assistance;Decreased awareness of need for safety  Problem Solving: Decreased awareness of errors;Assistance required to generate solutions;Assistance required to identify errors made;Assistance required to correct errors made;Assistance required to implement solutions  Insights: Not aware of deficits  Initiation: Requires cues for all  Sequencing: Requires cues for all  Perception  Overall Perceptual Status: [facility]               Education Given To: Patient; Family (Son: Deepa Weaver was present)  Education Provided: Role of Therapy;Plan of Care;Energy Conservation;IADL Safety;Orientation; Family Education;Equipment  Education Method: Verbal  Barriers to Learning: Cognition  Education Outcome: Continued education needed          AM-PAC Score        AM-PAC Inpatient Daily Activity Raw Score: 15 (06/01/22 1211)  AM-PAC Inpatient ADL T-Scale Score : 34.69 (06/01/22 1211)  ADL Inpatient CMS 0-100% Score: 56.46 (06/01/22 1211)  ADL Inpatient CMS G-Code Modifier : CK (06/01/22 1211)    Goals  Pt will demo improved cognitive status independently AEB correctly ID 4 orienting facts to improve engagement

## 2022-06-02 ENCOUNTER — APPOINTMENT (OUTPATIENT)
Dept: MRI IMAGING | Age: 77
DRG: 064 | End: 2022-06-02
Payer: MEDICARE

## 2022-06-02 LAB
ALBUMIN SERPL-MCNC: 3.7 GM/DL (ref 3.4–5)
ALP BLD-CCNC: 92 IU/L (ref 40–128)
ALT SERPL-CCNC: 25 U/L (ref 10–40)
ANION GAP SERPL CALCULATED.3IONS-SCNC: 13 MMOL/L (ref 4–16)
AST SERPL-CCNC: 54 IU/L (ref 15–37)
BASOPHILS ABSOLUTE: 0 K/CU MM
BASOPHILS RELATIVE PERCENT: 0.4 % (ref 0–1)
BILIRUB SERPL-MCNC: 0.4 MG/DL (ref 0–1)
BUN BLDV-MCNC: 66 MG/DL (ref 6–23)
CALCIUM SERPL-MCNC: 8.3 MG/DL (ref 8.3–10.6)
CHLORIDE BLD-SCNC: 111 MMOL/L (ref 99–110)
CHOLESTEROL: 95 MG/DL
CO2: 13 MMOL/L (ref 21–32)
CREAT SERPL-MCNC: 2 MG/DL (ref 0.6–1.1)
DIFFERENTIAL TYPE: ABNORMAL
EKG ATRIAL RATE: 71 BPM
EKG DIAGNOSIS: NORMAL
EKG P AXIS: 80 DEGREES
EKG P-R INTERVAL: 174 MS
EKG Q-T INTERVAL: 418 MS
EKG QRS DURATION: 132 MS
EKG QTC CALCULATION (BAZETT): 522 MS
EKG R AXIS: -74 DEGREES
EKG T AXIS: 50 DEGREES
EKG VENTRICULAR RATE: 94 BPM
EOSINOPHILS ABSOLUTE: 0 K/CU MM
EOSINOPHILS RELATIVE PERCENT: 0.4 % (ref 0–3)
ESTIMATED AVERAGE GLUCOSE: 128 MG/DL
GFR AFRICAN AMERICAN: 29 ML/MIN/1.73M2
GFR NON-AFRICAN AMERICAN: 24 ML/MIN/1.73M2
GLUCOSE BLD-MCNC: 111 MG/DL (ref 70–99)
GLUCOSE BLD-MCNC: 140 MG/DL (ref 70–99)
GLUCOSE BLD-MCNC: 227 MG/DL (ref 70–99)
HBA1C MFR BLD: 6.1 % (ref 4.2–6.3)
HCT VFR BLD CALC: 47.6 % (ref 37–47)
HDLC SERPL-MCNC: 31 MG/DL
HEMOGLOBIN: 15.2 GM/DL (ref 12.5–16)
IMMATURE NEUTROPHIL %: 0.7 % (ref 0–0.43)
LDL CHOLESTEROL CALCULATED: 40 MG/DL
LYMPHOCYTES ABSOLUTE: 0.9 K/CU MM
LYMPHOCYTES RELATIVE PERCENT: 12.5 % (ref 24–44)
MAGNESIUM: 2 MG/DL (ref 1.8–2.4)
MCH RBC QN AUTO: 32.5 PG (ref 27–31)
MCHC RBC AUTO-ENTMCNC: 31.9 % (ref 32–36)
MCV RBC AUTO: 101.9 FL (ref 78–100)
MONOCYTES ABSOLUTE: 0.6 K/CU MM
MONOCYTES RELATIVE PERCENT: 7.6 % (ref 0–4)
NUCLEATED RBC %: 0 %
PDW BLD-RTO: 15.1 % (ref 11.7–14.9)
PHOSPHORUS: 3.9 MG/DL (ref 2.5–4.9)
PLATELET # BLD: 150 K/CU MM (ref 140–440)
PMV BLD AUTO: 10.5 FL (ref 7.5–11.1)
POTASSIUM SERPL-SCNC: 5.1 MMOL/L (ref 3.5–5.1)
RBC # BLD: 4.67 M/CU MM (ref 4.2–5.4)
SEGMENTED NEUTROPHILS ABSOLUTE COUNT: 5.9 K/CU MM
SEGMENTED NEUTROPHILS RELATIVE PERCENT: 78.4 % (ref 36–66)
SODIUM BLD-SCNC: 137 MMOL/L (ref 135–145)
TOTAL IMMATURE NEUTOROPHIL: 0.05 K/CU MM
TOTAL NUCLEATED RBC: 0 K/CU MM
TOTAL PROTEIN: 5.7 GM/DL (ref 6.4–8.2)
TRIGL SERPL-MCNC: 119 MG/DL
WBC # BLD: 7.5 K/CU MM (ref 4–10.5)

## 2022-06-02 PROCEDURE — 1200000000 HC SEMI PRIVATE

## 2022-06-02 PROCEDURE — 6370000000 HC RX 637 (ALT 250 FOR IP): Performed by: HOSPITALIST

## 2022-06-02 PROCEDURE — 97535 SELF CARE MNGMENT TRAINING: CPT

## 2022-06-02 PROCEDURE — 2580000003 HC RX 258: Performed by: INTERNAL MEDICINE

## 2022-06-02 PROCEDURE — 82962 GLUCOSE BLOOD TEST: CPT

## 2022-06-02 PROCEDURE — 36415 COLL VENOUS BLD VENIPUNCTURE: CPT

## 2022-06-02 PROCEDURE — 93010 ELECTROCARDIOGRAM REPORT: CPT | Performed by: INTERNAL MEDICINE

## 2022-06-02 PROCEDURE — 6360000002 HC RX W HCPCS: Performed by: INTERNAL MEDICINE

## 2022-06-02 PROCEDURE — 99232 SBSQ HOSP IP/OBS MODERATE 35: CPT | Performed by: INTERNAL MEDICINE

## 2022-06-02 PROCEDURE — 85025 COMPLETE CBC W/AUTO DIFF WBC: CPT

## 2022-06-02 PROCEDURE — 6370000000 HC RX 637 (ALT 250 FOR IP): Performed by: NURSE PRACTITIONER

## 2022-06-02 PROCEDURE — 2500000003 HC RX 250 WO HCPCS: Performed by: HOSPITALIST

## 2022-06-02 PROCEDURE — 70551 MRI BRAIN STEM W/O DYE: CPT

## 2022-06-02 PROCEDURE — 83036 HEMOGLOBIN GLYCOSYLATED A1C: CPT

## 2022-06-02 PROCEDURE — 6370000000 HC RX 637 (ALT 250 FOR IP): Performed by: INTERNAL MEDICINE

## 2022-06-02 PROCEDURE — 83735 ASSAY OF MAGNESIUM: CPT

## 2022-06-02 PROCEDURE — 84100 ASSAY OF PHOSPHORUS: CPT

## 2022-06-02 PROCEDURE — 80061 LIPID PANEL: CPT

## 2022-06-02 PROCEDURE — 80053 COMPREHEN METABOLIC PANEL: CPT

## 2022-06-02 PROCEDURE — 94761 N-INVAS EAR/PLS OXIMETRY MLT: CPT

## 2022-06-02 RX ORDER — ASPIRIN 81 MG/1
81 TABLET, CHEWABLE ORAL DAILY
Status: DISCONTINUED | OUTPATIENT
Start: 2022-06-02 | End: 2022-06-06 | Stop reason: HOSPADM

## 2022-06-02 RX ORDER — CLOPIDOGREL BISULFATE 75 MG/1
75 TABLET ORAL DAILY
Status: DISCONTINUED | OUTPATIENT
Start: 2022-06-02 | End: 2022-06-06 | Stop reason: HOSPADM

## 2022-06-02 RX ORDER — NICOTINE 21 MG/24HR
1 PATCH, TRANSDERMAL 24 HOURS TRANSDERMAL DAILY
Status: DISCONTINUED | OUTPATIENT
Start: 2022-06-02 | End: 2022-06-06 | Stop reason: HOSPADM

## 2022-06-02 RX ORDER — DEXTROSE MONOHYDRATE 50 MG/ML
INJECTION, SOLUTION INTRAVENOUS CONTINUOUS
Status: DISPENSED | OUTPATIENT
Start: 2022-06-02 | End: 2022-06-05

## 2022-06-02 RX ADMIN — HEPARIN SODIUM 5000 UNITS: 5000 INJECTION INTRAVENOUS; SUBCUTANEOUS at 09:25

## 2022-06-02 RX ADMIN — MICONAZOLE NITRATE: 2 POWDER TOPICAL at 09:26

## 2022-06-02 RX ADMIN — AMLODIPINE BESYLATE 10 MG: 10 TABLET ORAL at 09:25

## 2022-06-02 RX ADMIN — HEPARIN SODIUM 5000 UNITS: 5000 INJECTION INTRAVENOUS; SUBCUTANEOUS at 21:55

## 2022-06-02 RX ADMIN — BUSPIRONE HYDROCHLORIDE 15 MG: 15 TABLET ORAL at 09:25

## 2022-06-02 RX ADMIN — ATORVASTATIN CALCIUM 40 MG: 40 TABLET, FILM COATED ORAL at 09:25

## 2022-06-02 RX ADMIN — SERTRALINE HYDROCHLORIDE 200 MG: 50 TABLET ORAL at 09:25

## 2022-06-02 RX ADMIN — CLOPIDOGREL BISULFATE 75 MG: 75 TABLET ORAL at 21:53

## 2022-06-02 RX ADMIN — MICONAZOLE NITRATE: 2 POWDER TOPICAL at 22:12

## 2022-06-02 RX ADMIN — METOPROLOL TARTRATE 50 MG: 50 TABLET, FILM COATED ORAL at 21:54

## 2022-06-02 RX ADMIN — BUSPIRONE HYDROCHLORIDE 15 MG: 15 TABLET ORAL at 21:53

## 2022-06-02 RX ADMIN — SODIUM CHLORIDE, PRESERVATIVE FREE 10 ML: 5 INJECTION INTRAVENOUS at 09:25

## 2022-06-02 RX ADMIN — METOPROLOL TARTRATE 50 MG: 50 TABLET, FILM COATED ORAL at 09:24

## 2022-06-02 RX ADMIN — ASPIRIN 81 MG 81 MG: 81 TABLET ORAL at 21:54

## 2022-06-02 RX ADMIN — DEXTROSE MONOHYDRATE: 50 INJECTION, SOLUTION INTRAVENOUS at 08:32

## 2022-06-02 RX ADMIN — LEVOTHYROXINE SODIUM 88 MCG: 0.09 TABLET ORAL at 05:29

## 2022-06-02 NOTE — CARE COORDINATION
Met with patient to discuss ARU. Explained to patient the required 3 hours of therapy a day. Also explained the average length of stay is 11 days, could be longer or shorter depending on recommendations of therapy and Dr. Rios Couch. Patient is A&Ox1. No family was at bedside to question. Called number on facesheet and its been disconnected. Patient unable to remember anyone's number. Will need to speak to family regarding ARU and discharge plan from ARU. Also want to discuss patients cognitive status at baseline. ARU will continue to follow.

## 2022-06-02 NOTE — PROGRESS NOTES
Comprehensive Nutrition Assessment    Type and Reason for Visit:  Initial,Wound    Nutrition Recommendations/Plan:   1. Continue current diet   2. Monitor meal time behavior, promote adequate hydration/PO intake   3. Begin low calorie, high protein oral nutrition supplement to optimize nutrition   4. Please document all Po intakes in I/O      Malnutrition Assessment:  Malnutrition Status: At risk for malnutrition (Comment) (06/02/22 0506)    Context:  Acute Illness       Nutrition Assessment:    Admitted with generalized weakness,hypoglycemia, and RICH on CKD. Pt currently on a renal diet (Low Phos/Na/K). Visited pt at lunch, she was sitting up and consumed % of meal (burger with salad). Stable weight. Briefly discussed about protein intake/encouragement, but pt seems to be pleasantly confused. Reports \"I've been here since Satuday. \" Possible plans for ARU noted. Will monitor as moderate nutrition risk. Nutrition Related Findings:    POCT 111, K/Na/Phos WNL, BUN 66, Cr 2, Glu 140, GFR 29 Hx DMII, HLD, CKD, Tobaccoabuse (1.5 ppd), noted 2 days of diarrhea Wound Type: Moisture Associate Skin Damage       Current Nutrition Intake & Therapies:    Average Meal Intake: % (lunch at visit)  Average Supplements Intake: None Ordered  ADULT DIET; Regular; Low Sodium (2 gm); Low Potassium (Less than 3000 mg/day); Low Phosphorus (Less than 1000 mg)    Anthropometric Measures:  Height: 5' 5\" (165.1 cm)  Ideal Body Weight (IBW): 125 lbs (57 kg)    Admission Body Weight: 197 lb 8.5 oz (89.6 kg) (first measured weight on 6/2 (admitted 5/31))  Current Body Weight: 197 lb 8.5 oz (89.6 kg), 158 % IBW. Weight Source: Bed Scale  Current BMI (kg/m2): 32.9  Usual Body Weight: 199 lb (90.3 kg) (6/24/21)  % Weight Change (Calculated): -0.7  BMI Categories: Obese Class 1 (BMI 30.0-34. 9)    Estimated Daily Nutrient Needs:  Energy Requirements Based On: Formula  Weight Used for Energy Requirements: Current  Energy (kcal/day): 2748-6848 (up to 1.2 stress factor)  Weight Used for Protein Requirements: Ideal  Protein (g/day): 57-74 (1-1.3 g/kg)  Method Used for Fluid Requirements: Standard Renal  Fluid (ml/day): fluids per renal    Nutrition Diagnosis:   · Predicted inadequate energy intake related to acute injury/trauma as evidenced by diarrhea (generalized weakness, possible poor po intake prior to admission)    Nutrition Interventions:   Food and/or Nutrient Delivery: Continue Current Diet,Start Oral Nutrition Supplement  Nutrition Education/Counseling: Education not appropriate (Confused at visit) Recommend Heart-healthy consistent carb nutrition therapy (NCM)   Coordination of Nutrition Care: Continue to monitor while inpatient,Feeding Assistance/Environment Change     Goals:  Goals: PO intake 75% or greater,by next RD assessment     Nutrition Monitoring and Evaluation:   Behavioral-Environmental Outcomes: None Identified  Food/Nutrient Intake Outcomes: Food and Nutrient Intake,Supplement Intake  Physical Signs/Symptoms Outcomes: Biochemical Data,GI Status,Meal Time Behavior,Weight,Nutrition Focused Physical Findings,Diarrhea    Discharge Planning:     Too soon to determine     Melvin Waters RD, LD  Contact: [phone]

## 2022-06-02 NOTE — PROGRESS NOTES
Hospitalist Progress Note      Name:  Carmine Hoang /Age/Sex: 1945  (68 y.o. female)   MRN & CSN:  [id number] & [id number] Admission Date/Time: 2022  6:54 PM   Location:  [address] [place] [address]- PCP: Karlos Grande MD         Hospital Day: 3      Assessment and Plan:     Patient is a 77-year-old female past medical history of hypertension, CKD, diabetes, hyperlipidemia, depression, was admitted for dizziness, acute kidney injury and elevated troponins. Patient kidney function is improving. Anticipate discharge to [facility] for rehab versus home with home care in 2 days 3 days pending clinical improvement, improvement in kidney parameters, cardiology nephrology clearance. Improvement,    #. RICH on CKD  Creatinine continues to improve. Nephrotoxic medications and continue to monitor renal functions close  Nephrology following     #.  Mild hyperkalemia     #. Detectable troponin  -Patient denying any chest pain, shortness of breath, no history of CAD  -Could be secondary to worsening renal function  -Serial troponins, repeat EKG  -2D echo: EF 55% with no regional wall abnormality,  Stress test read pending.  -Consult following. #.  Non-anion gap metabolic acidosis could be secondary to worsening renal function  . IV fluids for nephrology     #. Hypoglycemia  Continue dextrose containing fluids per nephrology  Obtain urine culture, blood cultures       #. Fall  -CT head, CT C-spine-no acute process  -X-ray-left [id number] acute fractures  -PT/OT evaluation. #.  Dizziness: Improved  -Patient admits to having dizziness since last Thursday, unsteady gait  -CT head-no acute process  -MRI head ordered to evaluate for CVA.  -PT/OT evaluation. #.  Hypertension  -Patient is on metoprolol, lisinopril, Norvasc, doxazosin, spironolactone, chlorthalidone  -Hold lisinopril, spironolactone, chlorthalidone, doxazosin     #.   Diabetes mellitus type 2, not on long-term insulin  -Patient is on glimepiride, PRN  polyethylene glycol, 17 g, Daily PRN  acetaminophen, 650 mg, Q6H PRN   Or  acetaminophen, 650 mg, Q6H PRN  glucose, 4 tablet, PRN  dextrose bolus, 125 mL, PRN   Or  dextrose bolus, 250 mL, PRN  glucagon (rDNA), 1 mg, PRN  dextrose, 100 mL/hr, PRN  LORazepam, 1 mg, Q8H PRN          Electronically signed by Tirso Denise MD on 6/2/2022 at 11:35 AM

## 2022-06-02 NOTE — PLAN OF CARE
Problem: Discharge Planning  Goal: Discharge to home or other facility with appropriate resources  6/2/2022 1119 by Alida Nogueira RN  Outcome: Progressing  6/2/2022 0653 by Cindy Madera RN  Outcome: Progressing     Problem: Pain  Goal: Verbalizes/displays adequate comfort level or baseline comfort level  6/2/2022 1119 by Alida Nogueira RN  Outcome: Progressing  6/2/2022 0653 by Cindy Madera RN  Outcome: Progressing     Problem: Chronic Conditions and Co-morbidities  Goal: Patient's chronic conditions and co-morbidity symptoms are monitored and maintained or improved  6/2/2022 1119 by Alida Nogueira RN  Outcome: Progressing  6/2/2022 0653 by Cindy Madera RN  Outcome: Progressing     Problem: Safety - Adult  Goal: Free from fall injury  Outcome: Progressing     Problem: ABCDS Injury Assessment  Goal: Absence of physical injury  Outcome: Progressing     Problem: Skin/Tissue Integrity  Goal: Absence of new skin breakdown  Description: 1. Monitor for areas of redness and/or skin breakdown  2. Assess vascular access sites hourly  3. Every 4-6 hours minimum:  Change oxygen saturation probe site  4. Every 4-6 hours:  If on nasal continuous positive airway pressure, respiratory therapy assess nares and determine need for appliance change or resting period.   Outcome: Progressing

## 2022-06-02 NOTE — CARE COORDINATION
Chart reviewed and met w/ pt, daughter and son at bedside. CM introduced self and explained role. Pt is from home w/ her daughter. Pt has been having periods of confusion at home but family feels is worse since she is in the hospital. CM advised family of therapy recommendations of SNF. Both children in agreement as they need more assistance with pt before she returns home. CM also explained ARU as a possibility as pt has medicare. Both are in agreement for referral to ARU and will talk to their other sister to obtain back up SNF choices. Referral sent to [facility], [place].

## 2022-06-02 NOTE — PROGRESS NOTES
Today's plan: Present care stress test was normal no need for heart cath      Admit Date:  5/31/2022    Subjective: K      Chief complaints on admission  Chief Complaint   Patient presents with    Fatigue     weakness all over, frequent falls, low blood sugar         History of present illness:Mckenna is a 68 y. o.year old who  presents with had concerns including Fatigue (weakness all over, frequent falls, low blood sugar). Past medical history:    has a past medical history of Acute kidney failure ([facility] [address] Chronic kidney disease, Hypertension, Hypothyroidism, and Type 2 diabetes mellitus without complication ([facility] [address] Past surgical history:   has no past surgical history on file. Social History:   reports that she has been smoking cigarettes. She started smoking about 60 years ago. She has a 59.00 pack-year smoking history. She has never used smokeless tobacco. She reports that she does not drink alcohol. Family history:  family history includes Cancer in her brother and paternal grandmother; Diabetes in her father; High Blood Pressure in her father; Kidney Disease in her father; No Known Problems in her brother; Other in her mother; Stroke in her maternal grandmother. Allergies   Allergen Reactions    Seasonal          Objective:   BP (!) 130/59   Pulse 67   Temp 97.9 °F (36.6 °C) (Axillary)   Resp 18   Ht 5' 5\" (1.651 m)   Wt 197 lb 9.6 oz (89.6 kg)   SpO2 93%   BMI 32.88 kg/m²       Intake/Output Summary (Last 24 hours) at 6/2/2022 1130  Last data filed at 6/2/2022 3880  Gross per 24 hour   Intake 2833.08 ml   Output 900 ml   Net 1933.08 ml           Physical Exam:  Constitutional:  Well developed, Well nourished, No acute distress, Non-toxic appearance. HENT:  Normocephalic, Atraumatic, Bilateral external ears normal, Oropharynx moist, No oral exudates, Nose normal. Neck- Normal range of motion, No tenderness, Supple, No stridor. Eyes:  PERRL, EOMI, Conjunctiva normal, No discharge. Respiratory:  Normal breath sounds, No respiratory distress, No wheezing, No chest tenderness. ,no use of accessory muscles, diaphragm movement is normal  Cardiovascular: (PMI) apex non displaced,no lifts no thrills, no s3,no s4, Normal heart rate, Normal rhythm, No murmurs, No rubs, No gallops. Carotid arteries pulse and amplitude are normal no bruit, no abdominal bruit noted ( normal abdominal aorta ausculation), femoral arteries pulse and amplitude are normal no bruit, pedal pulses are normal  GI:  Bowel sounds normal, Soft, No tenderness, No masses, No pulsatile masses. : External genitalia appear normal, No masses or lesions. No discharge. No CVA tenderness. Musculoskeletal:  Intact distal pulses, No edema, No tenderness, No cyanosis, No clubbing. Good range of motion in all major joints. No tenderness to palpation or major deformities noted. Back- No tenderness. Integument:  Warm, Dry, No erythema, No rash. Lymphatic:  No lymphadenopathy noted. Neurologic:  Alert & oriented x 3, Normal motor function, Normal sensory function, No focal deficits noted.    Psychiatric:  Affect normal, Judgment normal, Mood normal.     Medications:    sodium chloride flush  5-40 mL IntraVENous 2 times per day    heparin (porcine)  5,000 Units SubCUTAneous TID    amLODIPine  10 mg Oral Daily    atorvastatin  40 mg Oral Daily    busPIRone  15 mg Oral BID    levothyroxine  88 mcg Oral Daily    metoprolol tartrate  50 mg Oral BID    sertraline  200 mg Oral Daily    miconazole   Topical BID      dextrose 75 mL/hr at 06/02/22 9931    sodium chloride      dextrose       sodium chloride flush, sodium chloride, ondansetron **OR** ondansetron, polyethylene glycol, acetaminophen **OR** acetaminophen, glucose, dextrose bolus **OR** dextrose bolus, glucagon (rDNA), dextrose, LORazepam    Lab Data:  CBC:   Recent Labs     05/31/22  2141 06/02/22  0302   WBC 11.2* 7.5   HGB 17.4* 15.2   HCT 55.0* 47.6*   .0* 101.9*  150     BMP:   Recent Labs     05/31/22  2141 06/02/22  0302   * 137   K 5.4* 5.1    111*   CO2 10* 13*   PHOS  --  3.9   BUN 82* 66*   CREATININE 3.1* 2.0*     LIVER PROFILE:   Recent Labs     05/31/22 2141 06/02/22  0302   AST 51* 54*   ALT 23 25   BILITOT 0.2 0.4   ALKPHOS 103 92     PT/INR: No results for input(s): PROTIME, INR in the last 72 hours. APTT: No results for input(s): APTT in the last 72 hours. BNP:  No results for input(s): BNP in the last 72 hours. TROPONIN: @TROPONINI:3@      Assessment:  68 y. o.year old who is admitted for          Plan:  1. Elevated troponin, most likely from acute renal failure, echo and stress are normal  2. Anxiety not well controlled  3. Falls recommend to have PT evaluation  4. Dizziness will recommend orthostatic vitals  5. Diabetes continue glipizide Actos  6. Dyslipidemia continue statins  7. Hypothyroidism continue Synthroid  8. Acute on chronic renal failure as per nephrology    All labs, medications and tests reviewed, continue all other medications of all above medical condition listed as is.       Harrison Heard MD, MD 6/2/2022 11:30 AM

## 2022-06-02 NOTE — PROGRESS NOTES
Nephrology Progress Note  6/2/2022 11:20 AM        Subjective:   Admit Date: 5/31/2022  PCP: Kaitlin [place] MD    Interval History: Patient seen in early morning, this is a late entry    Diet: Probably not eating much    ROS: She was alert awake but not oriented  No respiratory distress or any other distress  She was pleasantly confused  Urine output recorded 900 cc for the last 24 hours  No fever and acceptable blood pressure    Data:     Current meds:    sodium chloride flush  5-40 mL IntraVENous 2 times per day    heparin (porcine)  5,000 Units SubCUTAneous TID    amLODIPine  10 mg Oral Daily    atorvastatin  40 mg Oral Daily    busPIRone  15 mg Oral BID    levothyroxine  88 mcg Oral Daily    metoprolol tartrate  50 mg Oral BID    sertraline  200 mg Oral Daily    miconazole   Topical BID      dextrose 75 mL/hr at 06/02/22 0832    sodium chloride      dextrose           I/O last 3 completed shifts: In: 1660 [P.O.:960; I.V.:700]  Out: 900 [Urine:900]    CBC:   Recent Labs     05/31/22  2141 06/02/22  0302   WBC 11.2* 7.5   HGB 17.4* 15.2    150          Recent Labs     05/31/22  2141 06/02/22  0302   * 137   K 5.4* 5.1    111*   CO2 10* 13*   BUN 82* 66*   CREATININE 3.1* 2.0*   GLUCOSE 107* 140*       Lab Results   Component Value Date    CALCIUM 8.3 06/02/2022    PHOS 3.9 06/02/2022       Objective:     Vitals: BP (!) 130/59   Pulse 67   Temp 97.9 °F (36.6 °C) (Axillary)   Resp 18   Ht 5' 5\" (1.651 m)   Wt 197 lb 9.6 oz (89.6 kg)   SpO2 93%   BMI 32.88 kg/m²     General appearance: Alert and awake but not oriented  HEENT: No gross conjunctival pallor-she is edentulous  Neck: Supple  Lungs: No gross crackles on auscultation  Heart: Regular rate and rhythm  Abdomen: Soft, nontender  Extremities: Trace edema      Problem List :         Impression :     1. Acute kidney injury on top of CKD stage IV A1-recovering and creatinine almost at baseline  2.  Metabolic acidosis-although now non-anion gap, partly from normal saline infusion, but high beta hydroxybutyrate level, with near normal blood sugar, and severe low serum bicarbonate-also just starvation ketosis-dextrose water would be the drug of choice  3. Potassium did normalize as expected  4. Underlying diabetes, hypertension and multiple other comorbid disease including dementia    Recommendation/Plan  :     1. Restart D5 water  2. Need oral nutrition  3. We will still look for any underlying occult infection  4. If it is all from starvation ketosis, bicarbonate expected to rise with D5 water  5.  Follow clinically and biochemically      Polly Villareal MD MD

## 2022-06-02 NOTE — PROGRESS NOTES
Occupational Therapy Treatment Note  Name: Simin Sanabria MRN: [id number] :   1945   Date:  2022   Admission Date: 2022 Room:  [address] [place] [address]-A     Primary Problem:  The primary encounter diagnosis was General weakness. Diagnoses of Acute renal failure superimposed on chronic kidney disease, unspecified CKD stage, unspecified acute renal failure type ([address] Elevated troponin, Fall, initial encounter, Left leg pain, and Hyperkalemia were also pertinent to this visit. Communication with other providers:  RN handoff     Subjective:  Patient states:  \"Well I am going to be here anyway aren't I?\"  Pain: Denied   Restrictions: Fall risk   RN at bedside    Objective:    Observation:  pt was semifowlers upon arrival, agreeable to session  Objective Measures:  Vitals stable throughout treatment     Treatment, including education:  Self Care Training:   Self care training was performed today. Cues were given for safety, sequence, UE/LE placement, visual cues, and balance. Activities performed today included toileting, doffing gown, donning new gown, doffing depends, donning new depends, washed hair & brushed hair   Toileting(urine +BM): Jimmy for perineal care   Dressing: LB dressing with MaxA for doffing/ donning depends, UB dressing with Jimmy to doff/ don gown   Bathing: Pt washed hair while seated with SetupA for shower cap   Grooming: Pt completed while seated with Jimmy to reach back of head     Transfers:   Sit to stand: trial x2 Jimmy   Stand to sit: trial x3 Jimmy   Toilet: ModA to stand from standard commode with VCs      Gait:   Pt walked ~18ft with CGA to ambulate from EOB to commode to sink and to recliner to engage in ADLs. Pt stood sinkside to engage in hand hygiene for ~3 minutes.      Education: Role of OT, OT POC, safety, benefits of EOB/OOB activity, rationale for treatment, orientation  Safety Measures: Gait belt used for safety of pt and therapist, Left in recliner, Alarm in place, call light and phone within reach, lines managed    Assessment / Impression:    Pt demo'd improved orientation status AEB correctly reporting self and location. Pt correctly ID'd nursing button to call for transfer and nursing needs. Pt benefits from verbal cues for sequencing and safety awareness. Pt continues to benefit from OT services to address barriers to improvement, stated below.      Patient's tolerance of treatment: Pt tolerated treatment well   Adverse Reaction: none   Significant change in status and impact:  none  Barriers to improvement: cognition, weakness, endurance     Plan for Next Session:    Continue OT POC    Time in:  10:04AM  Time out:  10:45AM   Timed treatment minutes:  41 minutes   Total treatment time:  41 minutes     Electronically signed by:    KOREY Alberts/L  License: [id number]  3/3/3296, 1:23 PM

## 2022-06-02 NOTE — FLOWSHEET NOTE
Pt Wrist band changed and IV removed due to swelling of right arm. IV restarted in Left wrist, 1 attempt. Pt tolerated well.

## 2022-06-02 NOTE — PROGRESS NOTES
Bryant Damon of the [facility] called to notify results of MRI were back. Provider was notified as a result.

## 2022-06-02 NOTE — PROGRESS NOTES
Provider messaged he cannot find MRI on pt Chart after notifying  Early in day that results were in. Notified provider it was read by Reyes Solis at 13:55 today 6/2/2022; Tele Tech called to notify pt hr dropped into 30's and is now climbing back up into the 40's.  Notified provider at this time

## 2022-06-03 LAB
SARS-COV-2, NAAT: NOT DETECTED
SOURCE: NORMAL

## 2022-06-03 PROCEDURE — 6360000002 HC RX W HCPCS: Performed by: INTERNAL MEDICINE

## 2022-06-03 PROCEDURE — 87635 SARS-COV-2 COVID-19 AMP PRB: CPT

## 2022-06-03 PROCEDURE — 6370000000 HC RX 637 (ALT 250 FOR IP): Performed by: NURSE PRACTITIONER

## 2022-06-03 PROCEDURE — 6370000000 HC RX 637 (ALT 250 FOR IP): Performed by: INTERNAL MEDICINE

## 2022-06-03 PROCEDURE — 97116 GAIT TRAINING THERAPY: CPT

## 2022-06-03 PROCEDURE — 99232 SBSQ HOSP IP/OBS MODERATE 35: CPT | Performed by: INTERNAL MEDICINE

## 2022-06-03 PROCEDURE — 6370000000 HC RX 637 (ALT 250 FOR IP): Performed by: HOSPITALIST

## 2022-06-03 PROCEDURE — 2580000003 HC RX 258: Performed by: INTERNAL MEDICINE

## 2022-06-03 PROCEDURE — 97112 NEUROMUSCULAR REEDUCATION: CPT

## 2022-06-03 PROCEDURE — 1200000000 HC SEMI PRIVATE

## 2022-06-03 PROCEDURE — 99223 1ST HOSP IP/OBS HIGH 75: CPT | Performed by: PSYCHIATRY & NEUROLOGY

## 2022-06-03 PROCEDURE — 2500000003 HC RX 250 WO HCPCS: Performed by: HOSPITALIST

## 2022-06-03 PROCEDURE — 97530 THERAPEUTIC ACTIVITIES: CPT

## 2022-06-03 PROCEDURE — 97535 SELF CARE MNGMENT TRAINING: CPT

## 2022-06-03 PROCEDURE — 94761 N-INVAS EAR/PLS OXIMETRY MLT: CPT

## 2022-06-03 RX ORDER — HYDROCODONE BITARTRATE AND ACETAMINOPHEN 5; 325 MG/1; MG/1
1 TABLET ORAL EVERY 8 HOURS PRN
Status: DISCONTINUED | OUTPATIENT
Start: 2022-06-03 | End: 2022-06-06 | Stop reason: HOSPADM

## 2022-06-03 RX ADMIN — HEPARIN SODIUM 5000 UNITS: 5000 INJECTION INTRAVENOUS; SUBCUTANEOUS at 20:50

## 2022-06-03 RX ADMIN — ATORVASTATIN CALCIUM 40 MG: 40 TABLET, FILM COATED ORAL at 09:16

## 2022-06-03 RX ADMIN — BUSPIRONE HYDROCHLORIDE 15 MG: 15 TABLET ORAL at 09:16

## 2022-06-03 RX ADMIN — AMLODIPINE BESYLATE 10 MG: 10 TABLET ORAL at 09:18

## 2022-06-03 RX ADMIN — MICONAZOLE NITRATE: 2 POWDER TOPICAL at 23:14

## 2022-06-03 RX ADMIN — HEPARIN SODIUM 5000 UNITS: 5000 INJECTION INTRAVENOUS; SUBCUTANEOUS at 16:37

## 2022-06-03 RX ADMIN — BUSPIRONE HYDROCHLORIDE 15 MG: 15 TABLET ORAL at 20:50

## 2022-06-03 RX ADMIN — LORAZEPAM 1 MG: 1 TABLET ORAL at 16:38

## 2022-06-03 RX ADMIN — SERTRALINE HYDROCHLORIDE 200 MG: 50 TABLET ORAL at 09:17

## 2022-06-03 RX ADMIN — MICONAZOLE NITRATE: 2 POWDER TOPICAL at 16:40

## 2022-06-03 RX ADMIN — SODIUM CHLORIDE, PRESERVATIVE FREE 10 ML: 5 INJECTION INTRAVENOUS at 23:14

## 2022-06-03 RX ADMIN — METOPROLOL TARTRATE 50 MG: 50 TABLET, FILM COATED ORAL at 20:50

## 2022-06-03 RX ADMIN — ASPIRIN 81 MG 81 MG: 81 TABLET ORAL at 09:16

## 2022-06-03 RX ADMIN — ACETAMINOPHEN 650 MG: 325 TABLET ORAL at 09:18

## 2022-06-03 RX ADMIN — CLOPIDOGREL BISULFATE 75 MG: 75 TABLET ORAL at 09:17

## 2022-06-03 RX ADMIN — HEPARIN SODIUM 5000 UNITS: 5000 INJECTION INTRAVENOUS; SUBCUTANEOUS at 10:55

## 2022-06-03 RX ADMIN — LEVOTHYROXINE SODIUM 88 MCG: 0.09 TABLET ORAL at 09:33

## 2022-06-03 RX ADMIN — METOPROLOL TARTRATE 50 MG: 50 TABLET, FILM COATED ORAL at 09:16

## 2022-06-03 ASSESSMENT — PAIN - FUNCTIONAL ASSESSMENT: PAIN_FUNCTIONAL_ASSESSMENT: PREVENTS OR INTERFERES SOME ACTIVE ACTIVITIES AND ADLS

## 2022-06-03 ASSESSMENT — PAIN SCALES - GENERAL
PAINLEVEL_OUTOF10: 2
PAINLEVEL_OUTOF10: 2

## 2022-06-03 ASSESSMENT — PAIN DESCRIPTION - ORIENTATION
ORIENTATION: RIGHT
ORIENTATION: RIGHT

## 2022-06-03 ASSESSMENT — PAIN DESCRIPTION - LOCATION
LOCATION: BACK
LOCATION: BACK

## 2022-06-03 ASSESSMENT — PAIN DESCRIPTION - DESCRIPTORS: DESCRIPTORS: SHARP

## 2022-06-03 ASSESSMENT — PAIN DESCRIPTION - PAIN TYPE: TYPE: ACUTE PAIN

## 2022-06-03 NOTE — PROGRESS NOTES
Physical Therapy Treatment Note  Name: Joan Olivo MRN: [id number] :   1945   Date:  6/3/2022   Admission Date: 2022 Room:  [address] [place] [address]     Restrictions/Precautions:          general precautions, fall risk    Communication with other providers:  RN OT    Subjective:  Patient states:  \"I don't remember the other day\"  Pain:   Location, Type, Intensity (0/10 to 10/10): Denies pain    Objective:    Observation:  Pt sitting EOB with OT present upon entry. Pt agreeable to session    Treatment, including education/measures:    Bed mobility: pt completed sit to supine SBA with cues for sequencing and increased time to complete. Pt utilizes UEs to assist with R LE into bed    Transfers: Pt completed STS to/from EOB CGA with cues for hand placement on bed vs walker, needs reinforcement    Gait: Pt ambulated 60' with RW with decreased logan, forward flexed posture, and maintained walker too far forward. Cues provided for walker management throughout    Balance: pt sat EOB ~8 minutes while performing dynamic UE activities SBA with no LOB throughout    Assessment / Impression:    Pt with improved cognition this date, alert and oriented to self, date, and location.  Pt with improved ambulation and transfers this session compared to prior sessions    Patient's tolerance of treatment:  fair   Adverse Reaction: n/a  Significant change in status and impact:  see assessment  Barriers to improvement:  decreased endurance, impaired balance    Plan for Next Session:    Continue to address ambulation and transfers    Time in:  1128  Time out:  1152  Timed treatment minutes:  24  Total treatment time:  24    Previously filed items:  Social/Functional History  Lives With: Son,Daughter  Type of Home: House  Home Layout: Two level  Home Access: Stairs to enter with rails  Entrance Stairs - Number of Steps: 1  Entrance Stairs - Rails: None  Bathroom Shower/Tub: Tub/Shower unit  Bathroom Toilet: Standard  Bathroom Equipment: Shower chair  Has the patient had two or more falls in the past year or any fall with injury in the past year?: Yes  Receives Help From: Family  ADL Assistance: [address]: Needs assistance  Homemaking Responsibilities: No  Ambulation Assistance: Independent  Transfer Assistance: Independent  Active : Yes  Mode of Transportation: Car  Occupation: Retired  Leisure & Hobbies: scratch lottery, coloring  IADL Comments: Receives help from son: Enedina  Time Frame for Short term goals: 1 week  Short term goal 1: Pt to complete all bed mobility min A  Short term goal 2: Pt to complete all STS transfers to/from bed, commode, and chair min A  Short term goal 3: Pt to ambulate 48' with LRAD min A    Electronically signed by:    Jordin Morris PT  6/3/2022, 11:56 AM

## 2022-06-03 NOTE — PROGRESS NOTES
Pt found in room half on recliner, half on bed. Pt had attempted to move herself to bed & the personal alarm was not in use. Charge RN [place] assisted safely returning pt to bed and activated the bed alarm. Re-educated the patient on calling out prior to getting up.  Pt verbalized understanding

## 2022-06-03 NOTE — PROGRESS NOTES
Nephrology Progress Note  6/3/2022 9:34 AM        Subjective:   Admit Date: 5/31/2022  PCP: Fam Michael MD    Interval History:  alert and awake this morning and pleasant    Diet:  reported eating some    ROS:   no distress, shortness of breath or confusion   no fever and acceptable blood pressure   urine output not recorded    Data:     Current meds:    aspirin  81 mg Oral Daily    clopidogrel  75 mg Oral Daily    nicotine  1 patch TransDERmal Daily    sodium chloride flush  5-40 mL IntraVENous 2 times per day    heparin (porcine)  5,000 Units SubCUTAneous TID    amLODIPine  10 mg Oral Daily    atorvastatin  40 mg Oral Daily    busPIRone  15 mg Oral BID    levothyroxine  88 mcg Oral Daily    metoprolol tartrate  50 mg Oral BID    sertraline  200 mg Oral Daily    miconazole   Topical BID      dextrose 75 mL/hr at 06/02/22 1927    sodium chloride      dextrose           I/O last 3 completed shifts: In: 2250.3 [P.O.:240;  I.V.:2010.3]  Out: 600 [Urine:600]    CBC:   Recent Labs     05/31/22 2141 06/02/22  0302   WBC 11.2* 7.5   HGB 17.4* 15.2    150          Recent Labs     05/31/22  2141 06/02/22  0302   * 137   K 5.4* 5.1    111*   CO2 10* 13*   BUN 82* 66*   CREATININE 3.1* 2.0*   GLUCOSE 107* 140*       Lab Results   Component Value Date    CALCIUM 8.3 06/02/2022    PHOS 3.9 06/02/2022       Objective:     Vitals: /64   Pulse 61   Temp 97.7 °F (36.5 °C) (Oral)   Resp 18   Ht 5' 5\" (1.651 m)   Wt 197 lb 9.6 oz (89.6 kg)   SpO2 98%   BMI 32.88 kg/m² ,    General appearance:   alert, awake, in bed with head of the bed elevated about 30 degrees  HEENT:   no gross conjunctival pallor- she is a dentulous  Neck:   supple  Lungs:   no crackles on auscultation  Heart:   seems regular rate and rhythm with systolic ejection murmur  Abdomen:  soft, nontender on palpation  Extremities:   trace edema      Problem List :         Impression :     1.  acute kidney injury with underlying CKD stage IV A1- creatinine was getting better  2.  metabolic acidosis thought to be from starvation ketosis- should resolve by now with D5 water and oral food  3.  multiple chronic diseases including diabetes, hypertension and dementia    Recommendation/Plan  :     1.  review labs when available, serum bicarbonate expected to improve  2.  if she can eat and drink discontinue D5 water  3.  good glycemic control, watch for iatrogenic and nosocomial complication  4.  follow clinically and biochemically      Shaylee Carrillo MD MD

## 2022-06-03 NOTE — PLAN OF CARE
Problem: Discharge Planning  Goal: Discharge to home or other facility with appropriate resources  Outcome: Progressing     Problem: Pain  Goal: Verbalizes/displays adequate comfort level or baseline comfort level  Outcome: Progressing  Flowsheets (Taken 6/3/2022 0815 by Karime Ramachandran LPN)  Verbalizes/displays adequate comfort level or baseline comfort level:   Encourage patient to monitor pain and request assistance   Assess pain using appropriate pain scale   Administer analgesics based on type and severity of pain and evaluate response   Implement non-pharmacological measures as appropriate and evaluate response   Notify Licensed Independent Practitioner if interventions unsuccessful or patient reports new pain     Problem: Chronic Conditions and Co-morbidities  Goal: Patient's chronic conditions and co-morbidity symptoms are monitored and maintained or improved  Outcome: Progressing     Problem: Safety - Adult  Goal: Free from fall injury  Outcome: Progressing     Problem: ABCDS Injury Assessment  Goal: Absence of physical injury  Outcome: Progressing     Problem: Skin/Tissue Integrity  Goal: Absence of new skin breakdown  Description: 1. Monitor for areas of redness and/or skin breakdown  2. Assess vascular access sites hourly  3. Every 4-6 hours minimum:  Change oxygen saturation probe site  4. Every 4-6 hours:  If on nasal continuous positive airway pressure, respiratory therapy assess nares and determine need for appliance change or resting period.   Outcome: Progressing     Problem: Nutrition Deficit:  Goal: Optimize nutritional status  Outcome: Progressing

## 2022-06-03 NOTE — CONSULTS
Neurology Service Consult Note  [place]  Patient Name: Joan Olivo  : 1945        Subjective:   Reason for consult: \"acute stroke\"  Patient seen and examined. Chart reviewed in detail. 68 y.o. -female with PMH of CKD, RICH, HTN, T2DM and tobacco use of presenting to [place] for unresponsive episode, as she was reportedly awake, but not responding. Neurology consulted for further evaluation for strokelike symptoms. Patient's daughter then called the squad, in which her blood sugar was 33, which patient received D10 per squad. Patient decided not to go to the hospital at that time. Later on the evening patient was in the restroom and slid off of the toilet and twisted her left leg, which at that time patient's daughter called the paramedics again and patient does agree to go to the hospital.  Patient has reports of feeling weak and dizzy in which her daughter tells me that she had complained of feeling dizzy on Thursday of last week. On arrival patient was found to be in RICH with history of CKD with a creatinine 3.1, now improving, CT of head obtained, and CT C-spine which were unremarkable. HDL 31, cholesterol 95, LDL 40  Exam is nonfocal/nonlateralizing, dizziness has improved. Patient did walk with a walker to the bathroom without any difficulty, and which was not witnessed by me however daughter at bedside reports. Patient has mild slurring of speech on exam, however denies any blurred vision or dizziness. Patient counseled and educated on stroke risk factors and tobacco use cessation. Past Medical History:   Diagnosis Date    Acute kidney failure ([address] 2018    Chronic kidney disease     Hypertension     Hypothyroidism     Type 2 diabetes mellitus without complication ([facility]     :   History reviewed. No pertinent surgical history.   Medications:  Scheduled Meds:   aspirin  81 mg Oral Daily    clopidogrel  75 mg Oral Daily  nicotine  1 patch TransDERmal Daily    sodium chloride flush  5-40 mL IntraVENous 2 times per day    heparin (porcine)  5,000 Units SubCUTAneous TID    amLODIPine  10 mg Oral Daily    atorvastatin  40 mg Oral Daily    busPIRone  15 mg Oral BID    levothyroxine  88 mcg Oral Daily    metoprolol tartrate  50 mg Oral BID    sertraline  200 mg Oral Daily    miconazole   Topical BID     Continuous Infusions:   dextrose 75 mL/hr at 06/02/22 1927    sodium chloride      dextrose       PRN Meds:. HYDROcodone-acetaminophen, sodium chloride flush, sodium chloride, ondansetron **OR** ondansetron, polyethylene glycol, acetaminophen **OR** acetaminophen, glucose, dextrose bolus **OR** dextrose bolus, glucagon (rDNA), dextrose, LORazepam    Allergies   Allergen Reactions    Seasonal      Social History     Socioeconomic History    Marital status:      Spouse name: Not on file    Number of children: Not on file    Years of education: Not on file    Highest education level: Not on file   Occupational History    Not on file   Tobacco Use    Smoking status: Current Every Day Smoker     Packs/day: 1.00     Years: 59.00     Pack years: 59.00     Types: Cigarettes     Start date: 1962    Smokeless tobacco: Never Used   Vaping Use    Vaping Use: Never used   Substance and Sexual Activity    Alcohol use: No    Drug use: Not on file    Sexual activity: Not on file   Other Topics Concern    Not on file   Social History Narrative    Not on file     Social Determinants of Health     Financial Resource Strain: Low Risk     Difficulty of Paying Living Expenses: Not very hard   Food Insecurity: No Food Insecurity    Worried About Running Out of Food in the Last Year: Never true    Imelda of Food in the Last Year: Never true   Transportation Needs:     Lack of Transportation (Medical): Not on file    Lack of Transportation (Non-Medical):  Not on file   Physical Activity:     Days of Exercise per Week: Not on file    Minutes of Exercise per Session: Not on file   Stress:     Feeling of Stress : Not on file   Social Connections:     Frequency of Communication with Friends and Family: Not on file    Frequency of Social Gatherings with Friends and Family: Not on file    Attends Restoration Services: Not on file    Active Member of Clubs or Organizations: Not on file    Attends Club or Organization Meetings: Not on file    Marital Status: Not on file   Intimate Partner Violence:     Fear of Current or Ex-Partner: Not on file    Emotionally Abused: Not on file    Physically Abused: Not on file    Sexually Abused: Not on file   Housing Stability:     Unable to Pay for Housing in the Last Year: Not on file    Number of [place] in the Last Year: Not on file    Unstable Housing in the Last Year: Not on file      Family History   Problem Relation Age of Onset    Diabetes Father     High Blood Pressure Father     Kidney Disease Father     Cancer Brother         unknown origin    Stroke Maternal Grandmother     Cancer Paternal Grandmother     Other Mother         hysterectomy    No Known Problems Brother          ROS (10 systems)  In addition to that documented in the HPI above, the additional ROS was obtained:  Constitutional: Denies fevers or chills  Eyes: Denies vision changes  ENMT: Denies sore throat  CV: Denies chest pain  Resp: Denies SOB  GI: Denies vomiting or diarrhea  : Denies painful urination  MSK: Denies recent trauma  Skin: Denies new rashes  Neuro: Dizziness and slurred speech  Endocrine: Denies unexpected weight loss  Heme: Denies bleeding disorders    Physical Exam:      Vitals:    06/03/22 0235 06/03/22 0441 06/03/22 0815 06/03/22 1633   BP:  (!) 125/59 138/64 (!) 111/57   Pulse: 61 50 61    Resp:  14 18 16   Temp:  98.1 °F (36.7 °C) 97.7 °F (36.5 °C) 97.8 °F (36.6 °C)   TempSrc:  Oral Oral Axillary   SpO2:  95% 98% 91%   Weight:       Height:           Wt Readings from Last 3 Encounters:   06/02/22 197 lb 9.6 oz (89.6 kg)   06/01/22 200 lb (90.7 kg)   04/22/22 199 lb (90.3 kg)     Temp Readings from Last 3 Encounters:   06/03/22 97.8 °F (36.6 °C) (Axillary)   12/29/21 96.8 °F (36 °C)   12/07/21 96.6 °F (35.9 °C) (Infrared)     BP Readings from Last 3 Encounters:   06/03/22 (!) 111/57   04/22/22 130/74   12/29/21 134/76     Pulse Readings from Last 3 Encounters:   06/03/22 61   04/22/22 57   12/29/21 73        Gen: A&O x 4, NAD, cooperative  HEENT: NC/AT, EOMI, PERRL, mmm, neck supple, no meningeal signs; Heart: SR  Lungs: Respirations Unlabored  Ext: no edema, no calf tenderness b/l  Psych: normal mood and affect  Skin: no rashes or lesions    NEUROLOGIC EXAM:    Mental Status: A&O to self, location, month and year, NAD, speech mildly slurred clear, language fluent, repetition and naming intact, follows commands appropriately    Cranial Nerve Exam:   CN II-XII: PERRL, VFF, no nystagmus, no gaze paresis, sensation V1-V3 intact b/l, muscles of facial expression symmetric; hearing intact to conversational tone, palate elevates symmetrically, shoulder elevation symmetric and tongue protrudes midline with movement side to side.     Motor Exam:       Strength 5/5 UE's/LE's b/l  Tone and bulk normal   No pronator drift    Deep Tendon Reflexes: 2/4 biceps, triceps, brachioradialis, patellar, and achilles b/l; flexor plantar responses b/l    Sensation: Intact light touch/vibration UE's/LE's b/l    Coordination/Cerebellum:       Tremors--none      Rapidly alternating movements: no dysdiadochokinesia b/l                Heel-to-Shin: no dysmetria b/l      Finger-to-Nose: no dysmetria b/l    Gait and stance:      Gait: deferred      LABS:        CBC:   Recent Labs     06/02/22  0302   WBC 7.5   RBC 4.67   HGB 15.2   HCT 47.6*      .9*     BMP:    Recent Labs     06/02/22  0302      K 5.1   *   CO2 13*   BUN 66*   CREATININE 2.0*   GLUCOSE 140*   CALCIUM 8.3     2D echo Summary   Trace aortic regurgitation is noted. Left ventricular systolic function is normal.   Ejection fraction is visually estimated at 55%. Moderate left ventricular hypertrophy. No evidence of any pericardial effusion. IMAGING:    CTH     Impression   No acute intracranial abnormality     MRI     Impression   1. Patient motion limits evaluation. 2. There are 2 tiny acute to subacute infarcts involving the left cerebellum   as well as a tiny acute infarct involving the left parietal lobe cortex.  No   significant mass effect or midline shift. 3. Otherwise, no acute intracranial abnormality. 4. Chronic lacunar infarcts involving the cerebellar hemispheres bilaterally. 5. Mild global parenchymal volume loss with mild-to-moderate chronic   microvascular ischemic changes. MRA ordered    Above imaging personally reviewed in detail. ASSESSMENT/PLAN:     68 y.o. -female with PMH of CKD, RICH, HTN, T2DM and tobacco use of presenting to [facility] for unresponsive episode, as she was reportedly awake, but not responding. Neurology consulted for further evaluation for strokelike symptoms. 1. 2 areas of acute/subacute infarcts in the left cerebellum and left parietal lobe cortex. 2. Chronic lacunar infarct in the cerebellar hemisphere bilaterally  -- Medications; aspirin, Plavix, statin for 21 days for secondary stroke prevention, then transition to aspirin, and statin.   -- Stroke risk factors: Counseled on smoking cessation, diabetes and blood pressure control along with diet modifications and activity increasing.   -- Further recommendations pending MRA    Neurodiagnostics  --CT as above  --CTA of head/ neck as above  --MRI brain as above  -- 2D echo no bubble study completed  -- Recommend cardiology input for possible cardiac event monitor, as her acute strokes are concerning for cardiogenic in nature    Patient discussed with attending physician Amanda Carlos    Thank you for allowing us to participate in the care of your patient. If there are any questions regarding evaluation please feel free to contact us. (Please note that portions of this note were completed with a voice recognition program.  Efforts were made to edit the dictations but occasionally words are mistranscribed.)      SILVIA Caldera - CNP, 6/3/2022      ------------------------------------    Attending Note:  I have rounded on this patient with Maria E Hui NP. I have reviewed the chart and we have discussed this case in detail. The patient was seen and examined by myself. Pertinent labs and imaging have been personally reviewed. Our findings and impressions were discussed with the patient. I concur with the Nurse Practitioner's assessment and plan. I reviewed her MRI of the brain which does reveal several areas of acute/subacute infarct, 2 areas in the left cerebellum, and one area in the left parietal lobe. Given that they are in multiple vascular territories are cardioembolic phenomenon needs to be considered. We will obtain echocardiogram.  Extended cardiac monitoring may be needed in the future. We will also get an MRA, especially because multiple areas of stroke in the posterior circulation. Secondary stroke prevention is as detailed above. We discussed modification of stroke risk factors. We will continue to follow.     Georgia Cintron DO 6/3/2022 8:01 PM

## 2022-06-03 NOTE — PROGRESS NOTES
Occupational Therapy Treatment Note  Name: David Alford MRN: [id number] :   1945   Date:  6/3/2022   Admission Date: 2022 Room:  [address] [place] [address]A     Primary Problem:  The primary encounter diagnosis was General weakness. Diagnoses of Acute renal failure superimposed on chronic kidney disease, unspecified CKD stage, unspecified acute renal failure type ([address] Elevated troponin, Fall, initial encounter, Left leg pain, and Hyperkalemia were also pertinent to this visit. Communication with other providers:  RN, PT     Subjective:  Patient states:  \"I am just scared and do not want to be here anymore\"  Pain: Denied   Restrictions: fall risk, general precautions  Daughter at bedside    Objective:    Observation:  pt was supine upon arrival, agreeable to session. Pt found crying after told Dx of TIA. Pt provided with therapeutic listening and subsequently activity to improve mood. Objective Measures:  Vitals remained stable throughout     Treatment, including education:  Self Care Training (20 minutes):   Self care training was performed today. Cues were given for safety, sequence, UE/LE placement, visual cues, and balance. Activities performed today included brushed hair, UB/LB bathing, UB dressing, LB dressing   Brushed hair: SetupA EOB  UB/LB bathing: Supervision/ SetupA EOB   UB dressing: Jimmy d/t management of gown around IV   LB dressing: Jimmy EOB. Pt doffed bilat socks. Pt utilized figure4 to don L sock & required assist to don R sock d/t fatigue. Therapeutic Activity Training (15 minutes): Therapeutic activity training was instructed today. Cues were given for safety, sequence, UE/LE placement, visual cues, and balance. Activities performed today included ambulating functional household distance CGA with 2WW.      Mobility:  Rolling: SBA   Supine to sit & sit to supine: SBA  Sit to stand &stand to sit: CGA    Education: Role of OT, OT POC, safety, body mechanics for transfers, benefits of EOB/OOB activity, rationale for treatment, new Dx, orientation to situation   Safety Measures: Gait belt used for safety of pt and therapist, Left in bed, Alarm in place, call light and phone within reach, lines managed    Assessment / Impression:    Pt requires max verbal cueing and tactile cueing for safety of transfers with 2WW. Pt may benefit from trialing a sockaid for times when fatigue is increased. Pt tolerated treatment well and benefited from empathy and education d/t new Dx to promote therapeutic engagement. Pt continues to benefit from services to address barriers listed below.      Patient's tolerance of treatment: Good   Adverse Reaction: Emotional d/t Dx of TIA   Significant change in status and impact:  Improvements of orientation and decreased confusion   Barriers to improvement: cognition, strength, endurance    Plan for Next Session:    Continue OT POC    Time in:  11:15AM  Time out:  11:50AM   Timed treatment minutes:  35 minutes   Total treatment time:  35 minutes     Electronically signed by:    Judy Valdovinos OT, OTR/L  6/3/2022, 2:25 PM

## 2022-06-03 NOTE — PROGRESS NOTES
Today's plan: Present care stress test was normal no need for heart cath, WILL SIGN OFF      Admit Date:  5/31/2022    Subjective: K      Chief complaints on admission  Chief Complaint   Patient presents with    Fatigue     weakness all over, frequent falls, low blood sugar         History of present illness:Mckenna is a 68 y. o.year old who  presents with had concerns including Fatigue (weakness all over, frequent falls, low blood sugar). Past medical history:    has a past medical history of Acute kidney failure ([facility] Chronic kidney disease, Hypertension, Hypothyroidism, and Type 2 diabetes mellitus without complication ([facility] [address] Past surgical history:   has no past surgical history on file. Social History:   reports that she has been smoking cigarettes. She started smoking about 60 years ago. She has a 59.00 pack-year smoking history. She has never used smokeless tobacco. She reports that she does not drink alcohol. Family history:  family history includes Cancer in her brother and paternal grandmother; Diabetes in her father; High Blood Pressure in her father; Kidney Disease in her father; No Known Problems in her brother; Other in her mother; Stroke in her maternal grandmother. Allergies   Allergen Reactions    Seasonal          Objective:   /64   Pulse 61   Temp 97.7 °F (36.5 °C) (Oral)   Resp 18   Ht 5' 5\" (1.651 m)   Wt 197 lb 9.6 oz (89.6 kg)   SpO2 98%   BMI 32.88 kg/m²       Intake/Output Summary (Last 24 hours) at 6/3/2022 1605  Last data filed at 6/2/2022 1927  Gross per 24 hour   Intake 597.26 ml   Output --   Net 597.26 ml           Physical Exam:  Constitutional:  Well developed, Well nourished, No acute distress, Non-toxic appearance. HENT:  Normocephalic, Atraumatic, Bilateral external ears normal, Oropharynx moist, No oral exudates, Nose normal. Neck- Normal range of motion, No tenderness, Supple, No stridor. Eyes:  PERRL, EOMI, Conjunctiva normal, No discharge. Respiratory:  Normal breath sounds, No respiratory distress, No wheezing, No chest tenderness. ,no use of accessory muscles, diaphragm movement is normal  Cardiovascular: (PMI) apex non displaced,no lifts no thrills, no s3,no s4, Normal heart rate, Normal rhythm, No murmurs, No rubs, No gallops. Carotid arteries pulse and amplitude are normal no bruit, no abdominal bruit noted ( normal abdominal aorta ausculation), femoral arteries pulse and amplitude are normal no bruit, pedal pulses are normal  GI:  Bowel sounds normal, Soft, No tenderness, No masses, No pulsatile masses. : External genitalia appear normal, No masses or lesions. No discharge. No CVA tenderness. Musculoskeletal:  Intact distal pulses, No edema, No tenderness, No cyanosis, No clubbing. Good range of motion in all major joints. No tenderness to palpation or major deformities noted. Back- No tenderness. Integument:  Warm, Dry, No erythema, No rash. Lymphatic:  No lymphadenopathy noted. Neurologic:  Alert & oriented x 3, Normal motor function, Normal sensory function, No focal deficits noted.    Psychiatric:  Affect normal, Judgment normal, Mood normal.     Medications:    aspirin  81 mg Oral Daily    clopidogrel  75 mg Oral Daily    nicotine  1 patch TransDERmal Daily    sodium chloride flush  5-40 mL IntraVENous 2 times per day    heparin (porcine)  5,000 Units SubCUTAneous TID    amLODIPine  10 mg Oral Daily    atorvastatin  40 mg Oral Daily    busPIRone  15 mg Oral BID    levothyroxine  88 mcg Oral Daily    metoprolol tartrate  50 mg Oral BID    sertraline  200 mg Oral Daily    miconazole   Topical BID      dextrose 75 mL/hr at 06/02/22 1927    sodium chloride      dextrose       sodium chloride flush, sodium chloride, ondansetron **OR** ondansetron, polyethylene glycol, acetaminophen **OR** acetaminophen, glucose, dextrose bolus **OR** dextrose bolus, glucagon (rDNA), dextrose, LORazepam    Lab Data:  CBC:   Recent Labs 05/31/22 2141 06/02/22  0302   WBC 11.2* 7.5   HGB 17.4* 15.2   HCT 55.0* 47.6*   .0* 101.9*    150     BMP:   Recent Labs     05/31/22 2141 06/02/22  0302   * 137   K 5.4* 5.1    111*   CO2 10* 13*   PHOS  --  3.9   BUN 82* 66*   CREATININE 3.1* 2.0*     LIVER PROFILE:   Recent Labs     05/31/22 2141 06/02/22  0302   AST 51* 54*   ALT 23 25   BILITOT 0.2 0.4   ALKPHOS 103 92     PT/INR: No results for input(s): PROTIME, INR in the last 72 hours. APTT: No results for input(s): APTT in the last 72 hours. BNP:  No results for input(s): BNP in the last 72 hours. TROPONIN: @TROPONINI:3@      Assessment:  68 y. o.year old who is admitted for          Plan:  1. Elevated troponin, most likely from acute renal failure, echo and stress are normal  2. Anxiety not well controlled  3. Falls recommend to have PT evaluation  4. Dizziness will recommend orthostatic vitals  5. Diabetes continue glipizide Actos  6. Dyslipidemia continue statins  7. Hypothyroidism continue Synthroid  8. Acute on chronic renal failure as per nephrology    All labs, medications and tests reviewed, continue all other medications of all above medical condition listed as is.       Christine Onofre MD, MD 6/3/2022 4:05 PM

## 2022-06-03 NOTE — PROGRESS NOTES
Hospitalist Progress Note      Name:  Simin Sanabria /Age/Sex: 1945  (68 y.o. female)   MRN & CSN:  [id number] & [id number] Admission Date/Time: 2022  6:54 PM   Location:  [address] [place] [address]- PCP: Zenia Rojas MD         Hospital Day: 4      Assessment and Plan:     Patient is a 59-year-old female past medical history of hypertension, CKD, diabetes, hyperlipidemia, depression, was admitted for dizziness, acute kidney injury and elevated troponins. Patient kidney function is improving. Anticipate discharge to [facility] for rehab versus home with home care in 2 days 3 days pending clinical improvement, improvement in kidney parameters, cardiology nephrology clearance. Improvement,       #. MRI evidence of acute/subacute stroke. MRI obtained yesterday reviewed and shows acute/subacute stroke  A1c, lipid panel ordered  Continue neurochecks per stroke protocol  CTA head and neck pending: May need MRA depending on renal functions  Continue statin  Started aspirin and Plavix  Neurology consulted      #. RICH on CKD  Creatinine continues to improve. Nephrotoxic medications and continue to monitor renal functions close  Nephrology following     #.  Mild hyperkalemia     #. Detectable troponin  -Patient denying any chest pain, shortness of breath, no history of CAD  -Could be secondary to worsening renal function  -Serial troponins, repeat EKG  -2D echo: EF 55% with no regional wall abnormality,  Stress test read did not show any evidence of reversible chronic ischemia.  -Consult following. #.  Non-anion gap metabolic acidosis could be secondary to worsening renal function  . IV fluids for nephrology     #. Hypoglycemia: Improved  Continue dextrose containing fluids per nephrology  Obtain urine culture, blood cultures        #. Fall  -CT head, CT C-spine-no acute process  -X-ray-left svlfy-mlecif-od acute fractures  -PT/OT evaluation.      #.  Dizziness: Improved  -Patient admits to having dizziness since last Thursday, unsteady gait  -CT head-no acute process  -MRI shows stroke. -PT/OT evaluation. #.  Hypertension  -Patient is on metoprolol, lisinopril, Norvasc, doxazosin, spironolactone, chlorthalidone  -Hold lisinopril, spironolactone, chlorthalidone, doxazosin     #. Diabetes mellitus type 2, not on long-term insulin  -Patient is on glimepiride, pioglitazone, Farxiga-hold  -Hypoglycemia protocol ordered  -HbA1c-six-point 2-4/2020 2/2022     #. Hyperlipidemia  -Patient is on atorvastatin     #. Hypothyroidism-continue levothyroxine     #. Depression/anxiety/agoraphobia  -Patient is on buspirone, lorazepam, sertraline     #. CKD stage IV     #. Chronic tobacco dependence     DVT prophylaxis: Heparin  CODE STATUS: Total support      Subjective. :     Was seen and examined today. No acute events overnight. Medical history vital signs. Objective:   Vitals:   Vitals:    06/03/22 0815   BP: 138/64   Pulse: 61   Resp: 18   Temp: 97.7 °F (36.5 °C)   SpO2: 98%         Physical Exam:   GEN: Awake, alert, in no apparent distress awake female, sitting upright in bed in no apparent distress. Appears given age. HEENT: Atraumatic, normocephalic, PERRLA, EOMI  NECK: Supple, no apparent thyromegaly or masses. RESP: Clear lungs to auscultation  CARDIO/VASC: Regular rate and rhythm, normal S1, S2, no murmurs  GI: Abdomen is soft, nontender, no significant organomegaly noted, bowel sounds present  MSK: No gross joint deformities.   Skin: No significant rashes, skin lesions noted  Neuro: AOx3, cranial nerves grossly intact, no focal motor or sensory deficits   PSYCH: Affect appropriate    Medications:   Medications:    aspirin  81 mg Oral Daily    clopidogrel  75 mg Oral Daily    nicotine  1 patch TransDERmal Daily    sodium chloride flush  5-40 mL IntraVENous 2 times per day    heparin (porcine)  5,000 Units SubCUTAneous TID    amLODIPine  10 mg Oral Daily    atorvastatin  40 mg Oral Daily    busPIRone  15 mg Oral BID    levothyroxine  88 mcg Oral Daily    metoprolol tartrate  50 mg Oral BID    sertraline  200 mg Oral Daily    miconazole   Topical BID      Infusions:    dextrose 75 mL/hr at 06/02/22 1927    sodium chloride      dextrose       PRN Meds: sodium chloride flush, 5-40 mL, PRN  sodium chloride, , PRN  ondansetron, 4 mg, Q8H PRN   Or  ondansetron, 4 mg, Q6H PRN  polyethylene glycol, 17 g, Daily PRN  acetaminophen, 650 mg, Q6H PRN   Or  acetaminophen, 650 mg, Q6H PRN  glucose, 4 tablet, PRN  dextrose bolus, 125 mL, PRN   Or  dextrose bolus, 250 mL, PRN  glucagon (rDNA), 1 mg, PRN  dextrose, 100 mL/hr, PRN  LORazepam, 1 mg, Q8H PRN          Electronically signed by Mala Bethea MD on 6/3/2022 at 9:46 AM

## 2022-06-03 NOTE — PROGRESS NOTES
This tech spoke to Dr Zoran Sheth and ordering physician Dr Ramirez Manuel. Per Dr Zoran Sheth IV contrast isn't recommended for this patient due to low GFR and patient being a stage 4 CKD. Passed this information to Dr Ramirez Manuel who stated to hold off on CTA head neck for now.

## 2022-06-03 NOTE — CARE COORDINATION
Patient arrives to ER with c/o anxiety, states she has been having intermittent panic attacks since 1400 today, has been feeling heart palpitations since with nausea.  Patient in no acute distress at time of assessment Rec'd update from [facility], that pt has been approved for admission to ARU. Perfect serve sent to Dr. Marine Webb with update. 12:46 PM Rec'd update from Dr. Marine Webb that pt not ready today but may be over the weekend. CM called Stuart and updated her with information.

## 2022-06-04 ENCOUNTER — APPOINTMENT (OUTPATIENT)
Dept: MRI IMAGING | Age: 77
DRG: 064 | End: 2022-06-04
Payer: MEDICARE

## 2022-06-04 LAB
ALBUMIN SERPL-MCNC: 3.5 GM/DL (ref 3.4–5)
ALP BLD-CCNC: 81 IU/L (ref 40–128)
ALT SERPL-CCNC: 21 U/L (ref 10–40)
ANION GAP SERPL CALCULATED.3IONS-SCNC: 12 MMOL/L (ref 4–16)
AST SERPL-CCNC: 26 IU/L (ref 15–37)
BILIRUB SERPL-MCNC: 0.3 MG/DL (ref 0–1)
BUN BLDV-MCNC: 46 MG/DL (ref 6–23)
CALCIUM SERPL-MCNC: 8.1 MG/DL (ref 8.3–10.6)
CHLORIDE BLD-SCNC: 105 MMOL/L (ref 99–110)
CO2: 16 MMOL/L (ref 21–32)
CREAT SERPL-MCNC: 1.6 MG/DL (ref 0.6–1.1)
GFR AFRICAN AMERICAN: 38 ML/MIN/1.73M2
GFR NON-AFRICAN AMERICAN: 31 ML/MIN/1.73M2
GLUCOSE BLD-MCNC: 124 MG/DL (ref 70–99)
MAGNESIUM: 1.7 MG/DL (ref 1.8–2.4)
PHOSPHORUS: 2.6 MG/DL (ref 2.5–4.9)
POTASSIUM SERPL-SCNC: 4.2 MMOL/L (ref 3.5–5.1)
SODIUM BLD-SCNC: 133 MMOL/L (ref 135–145)
TOTAL PROTEIN: 5.4 GM/DL (ref 6.4–8.2)

## 2022-06-04 PROCEDURE — 6370000000 HC RX 637 (ALT 250 FOR IP): Performed by: INTERNAL MEDICINE

## 2022-06-04 PROCEDURE — 83735 ASSAY OF MAGNESIUM: CPT

## 2022-06-04 PROCEDURE — 94761 N-INVAS EAR/PLS OXIMETRY MLT: CPT

## 2022-06-04 PROCEDURE — 70544 MR ANGIOGRAPHY HEAD W/O DYE: CPT

## 2022-06-04 PROCEDURE — 6370000000 HC RX 637 (ALT 250 FOR IP): Performed by: HOSPITALIST

## 2022-06-04 PROCEDURE — 2580000003 HC RX 258: Performed by: INTERNAL MEDICINE

## 2022-06-04 PROCEDURE — 36415 COLL VENOUS BLD VENIPUNCTURE: CPT

## 2022-06-04 PROCEDURE — 87040 BLOOD CULTURE FOR BACTERIA: CPT

## 2022-06-04 PROCEDURE — 6370000000 HC RX 637 (ALT 250 FOR IP): Performed by: NURSE PRACTITIONER

## 2022-06-04 PROCEDURE — 84100 ASSAY OF PHOSPHORUS: CPT

## 2022-06-04 PROCEDURE — 2500000003 HC RX 250 WO HCPCS: Performed by: HOSPITALIST

## 2022-06-04 PROCEDURE — 97110 THERAPEUTIC EXERCISES: CPT

## 2022-06-04 PROCEDURE — 6360000002 HC RX W HCPCS: Performed by: HOSPITALIST

## 2022-06-04 PROCEDURE — 99233 SBSQ HOSP IP/OBS HIGH 50: CPT | Performed by: NURSE PRACTITIONER

## 2022-06-04 PROCEDURE — 70547 MR ANGIOGRAPHY NECK W/O DYE: CPT

## 2022-06-04 PROCEDURE — 1200000000 HC SEMI PRIVATE

## 2022-06-04 PROCEDURE — 97116 GAIT TRAINING THERAPY: CPT

## 2022-06-04 PROCEDURE — 6360000002 HC RX W HCPCS: Performed by: INTERNAL MEDICINE

## 2022-06-04 PROCEDURE — 80053 COMPREHEN METABOLIC PANEL: CPT

## 2022-06-04 RX ORDER — MAGNESIUM SULFATE IN WATER 40 MG/ML
2000 INJECTION, SOLUTION INTRAVENOUS ONCE
Status: COMPLETED | OUTPATIENT
Start: 2022-06-04 | End: 2022-06-04

## 2022-06-04 RX ADMIN — BUSPIRONE HYDROCHLORIDE 15 MG: 15 TABLET ORAL at 21:00

## 2022-06-04 RX ADMIN — ATORVASTATIN CALCIUM 40 MG: 40 TABLET, FILM COATED ORAL at 10:07

## 2022-06-04 RX ADMIN — DEXTROSE MONOHYDRATE: 50 INJECTION, SOLUTION INTRAVENOUS at 22:33

## 2022-06-04 RX ADMIN — SERTRALINE HYDROCHLORIDE 200 MG: 50 TABLET ORAL at 10:07

## 2022-06-04 RX ADMIN — SODIUM CHLORIDE, PRESERVATIVE FREE 10 ML: 5 INJECTION INTRAVENOUS at 20:59

## 2022-06-04 RX ADMIN — BUSPIRONE HYDROCHLORIDE 15 MG: 15 TABLET ORAL at 10:08

## 2022-06-04 RX ADMIN — HYDROCODONE BITARTRATE AND ACETAMINOPHEN 1 TABLET: 5; 325 TABLET ORAL at 10:06

## 2022-06-04 RX ADMIN — ASPIRIN 81 MG 81 MG: 81 TABLET ORAL at 10:08

## 2022-06-04 RX ADMIN — MICONAZOLE NITRATE: 2 POWDER TOPICAL at 12:49

## 2022-06-04 RX ADMIN — MAGNESIUM SULFATE 2000 MG: 2 INJECTION INTRAVENOUS at 12:55

## 2022-06-04 RX ADMIN — AMLODIPINE BESYLATE 10 MG: 10 TABLET ORAL at 10:07

## 2022-06-04 RX ADMIN — HEPARIN SODIUM 5000 UNITS: 5000 INJECTION INTRAVENOUS; SUBCUTANEOUS at 10:08

## 2022-06-04 RX ADMIN — CLOPIDOGREL BISULFATE 75 MG: 75 TABLET ORAL at 10:07

## 2022-06-04 RX ADMIN — HEPARIN SODIUM 5000 UNITS: 5000 INJECTION INTRAVENOUS; SUBCUTANEOUS at 21:05

## 2022-06-04 RX ADMIN — SODIUM CHLORIDE, PRESERVATIVE FREE 10 ML: 5 INJECTION INTRAVENOUS at 10:08

## 2022-06-04 RX ADMIN — LORAZEPAM 1 MG: 1 TABLET ORAL at 20:59

## 2022-06-04 RX ADMIN — MICONAZOLE NITRATE: 2 POWDER TOPICAL at 21:05

## 2022-06-04 ASSESSMENT — PAIN DESCRIPTION - DESCRIPTORS
DESCRIPTORS: SHARP
DESCRIPTORS: ACHING

## 2022-06-04 ASSESSMENT — PAIN - FUNCTIONAL ASSESSMENT
PAIN_FUNCTIONAL_ASSESSMENT: ACTIVITIES ARE NOT PREVENTED
PAIN_FUNCTIONAL_ASSESSMENT: PREVENTS OR INTERFERES SOME ACTIVE ACTIVITIES AND ADLS

## 2022-06-04 ASSESSMENT — PAIN SCALES - GENERAL
PAINLEVEL_OUTOF10: 0
PAINLEVEL_OUTOF10: 7
PAINLEVEL_OUTOF10: 2

## 2022-06-04 ASSESSMENT — PAIN DESCRIPTION - PAIN TYPE
TYPE: CHRONIC PAIN
TYPE: ACUTE PAIN

## 2022-06-04 ASSESSMENT — PAIN DESCRIPTION - LOCATION
LOCATION: LEG
LOCATION: BACK

## 2022-06-04 ASSESSMENT — PAIN DESCRIPTION - FREQUENCY: FREQUENCY: CONTINUOUS

## 2022-06-04 ASSESSMENT — PAIN DESCRIPTION - ORIENTATION
ORIENTATION: RIGHT
ORIENTATION: LEFT;RIGHT

## 2022-06-04 ASSESSMENT — PAIN DESCRIPTION - ONSET: ONSET: ON-GOING

## 2022-06-04 NOTE — PROGRESS NOTES
Nephrology Progress Note  6/4/2022 11:31 AM  Subjective: Interval History: Rafael Perrin is a 68 y.o. female with weakness and concern for possible starvation but appears more lucid today and taking p.o. diet        Data:   Scheduled Meds:   magnesium sulfate  2,000 mg IntraVENous Once    aspirin  81 mg Oral Daily    clopidogrel  75 mg Oral Daily    nicotine  1 patch TransDERmal Daily    sodium chloride flush  5-40 mL IntraVENous 2 times per day    heparin (porcine)  5,000 Units SubCUTAneous TID    amLODIPine  10 mg Oral Daily    atorvastatin  40 mg Oral Daily    busPIRone  15 mg Oral BID    levothyroxine  88 mcg Oral Daily    metoprolol tartrate  50 mg Oral BID    sertraline  200 mg Oral Daily    miconazole   Topical BID     Continuous Infusions:   dextrose 75 mL/hr at 06/02/22 1927    sodium chloride      dextrose           CBC   Recent Labs     06/02/22 0302   WBC 7.5   HGB 15.2   HCT 47.6*         BMP   Recent Labs     06/02/22  0302 06/04/22  0706    133*   K 5.1 4.2   * 105   CO2 13* 16*   PHOS 3.9 2.6   BUN 66* 46*   CREATININE 2.0* 1.6*     Hepatic:   Recent Labs     06/02/22  0302 06/04/22  0706   AST 54* 26   ALT 25 21   BILITOT 0.4 0.3   ALKPHOS 92 81     Troponin: No results for input(s): TROPONINI in the last 72 hours. BNP: No results for input(s): BNP in the last 72 hours. Lipids:   Recent Labs     06/02/22 2017   CHOL 95   HDL 31*     ABGs: No results found for: PHART, PO2ART, SZH0VNU  INR: No results for input(s): INR in the last 72 hours.   Renal Labs  Albumin:    Lab Results   Component Value Date    LABALBU 3.5 06/04/2022     Calcium:    Lab Results   Component Value Date    CALCIUM 8.1 06/04/2022     Phosphorus:    Lab Results   Component Value Date    PHOS 2.6 06/04/2022     U/A:    Lab Results   Component Value Date    NITRU NEGATIVE 05/31/2022    NITRU Negative 03/15/2018    COLORU YELLOW 05/31/2022    PHUR 6.0 03/15/2018    WBCUA <1 05/31/2022 RBCUA <1 05/31/2022    TRICHOMONAS NONE SEEN 05/31/2022    BACTERIA NEGATIVE 05/31/2022    CLARITYU CLEAR 05/31/2022    SPECGRAV 1.025 05/31/2022    UROBILINOGEN 0.2 05/31/2022    BILIRUBINUR SMALL 05/31/2022    BLOODU MODERATE 05/31/2022    GLUCOSEU Negative 03/15/2018    KETUA TRACE 05/31/2022     ABG:  No results found for: PHART, XCZ9WFQ, PO2ART, CKJ6XKW, BEART, THGBART, REO6QAD, B7GJGULZ  HgBA1c:    Lab Results   Component Value Date    LABA1C 6.1 06/02/2022     Microalbumen/Creatinine ratio:  No components found for: RUCREAT  TSH:    Lab Results   Component Value Date    TSH 4.74 04/22/2022     IRON:  No results found for: IRON  Iron Saturation:  No components found for: PERCENTFE  TIBC:  No results found for: TIBC  FERRITIN:  No results found for: FERRITIN  RPR:  No results found for: RPR  JESSEE:  No results found for: ANATITER, JESSEE  24 Hour Urine for Creatinine Clearance:  No components found for: CREAT4, UHRS10, UTV10      Objective:   I/O: No intake/output data recorded. I/O last 3 completed shifts: In: 597.3 [I.V.:597.3]  Out: -   No intake/output data recorded. Vitals: /63   Pulse 56   Temp 97.2 °F (36.2 °C) (Oral)   Resp 18   Ht 5' 5\" (1.651 m)   Wt 198 lb 9.6 oz (90.1 kg)   SpO2 96%   BMI 33.05 kg/m²  {  General appearance: awake weak  HEENT: Head: Normal, normocephalic, atraumatic.   Neck: supple, symmetrical, trachea midline  Lungs: diminished breath sounds bilaterally  Heart: S1, S2 normal  Abdomen: abnormal findings:  soft nt  Extremities: edema trace  Neurologic: Mental status: alertness: alert        Assessment and Plan:      IMP:  #1 subacute infarct left cerebellum in the setting of chronic lacunar infarct  #2 acute renal failure from ATN on CKD 4  #3 type 2 diabetes  #4 hypertension  #5 metabolic acidosis with starvation ketoacidosis    Plan     #1 follow-up neuro recommendations and evaluation patient does not want more aggressive care or work-up  #2 creatinine holding 1.6 slightly better will monitor  #3 monitor glucose control  #4 blood pressure stable  #5 on gentle IV fluids monitor acidosis potassium and sodium low stable           Yazmin Hernandez MD, MD

## 2022-06-04 NOTE — PROGRESS NOTES
left safely in the recliner, with call light/phone in reach with alarm applied. Gait belt and mask were used for transfers and gait.   Assessment / Impression:     Patient's tolerance of treatment:  fair   Adverse Reaction: anxiety  Significant change in status and impact:  no  Barriers to improvement:  Anxiety, weakness  Plan for Next Session:    Will cont to work towards pt's goals per patient tolerance  Time in:  10:03  Time out:  10:36  Timed treatment minutes:  33  Total treatment time:  35  Previously filed items:  Social/Functional History  Lives With: Son,Daughter  Type of Home: House  Home Layout: Two level  Home Access: Stairs to enter with rails  Entrance Stairs - Number of Steps: 1  Entrance Stairs - Rails: None  Bathroom Shower/Tub: Tub/Shower unit  Bathroom Toilet: Standard  Bathroom Equipment: Shower chair  Has the patient had two or more falls in the past year or any fall with injury in the past year?: Yes  Receives Help From: Family  ADL Assistance: [address]: Needs assistance  Homemaking Responsibilities: No  Ambulation Assistance: Independent  Transfer Assistance: Independent  Active : Yes  Mode of Transportation: Car  Occupation: Retired  Leisure & Hobbies: scratch lottery, coloring  IADL Comments: Receives help from son: [address] [facility] [facility]  Time Frame for Short term goals: 1 week  Short term goal 1: Pt to complete all bed mobility min A  Short term goal 2: Pt to complete all STS transfers to/from bed, commode, and chair min A  Short term goal 3: Pt to ambulate 48' with LRAD min A     Electronically signed by:    Alphonse Olvera PTA PTA  6/4/2022, 10:43 AM

## 2022-06-04 NOTE — PROGRESS NOTES
Trying to reach nurse - we need to MRI screening form completed, before we can schedule pt.  Any questions please call [phone]

## 2022-06-04 NOTE — PROGRESS NOTES
Neurology Service Progress Note   [facility]  Patient Name: Minh Palma  : 1945        Subjective:   Reason for consult: \"acute stroke\"  Patient seen and examined. Chart reviewed in detail. Patient states she knows she had a stroke but is frustrated \"with medication\" and understands that she has had a stroke but doesn't want to go through all the work up. She had an MRI we are awaiting for the MRA studies, she admits to smoking, I explained to her that we want her on ASA and Plavix and she verbalizes understanding. Appreciate cardiology recommendations. Past Medical History:   Diagnosis Date    Acute kidney failure ([facility] [place] [address] 2018    Chronic kidney disease     Hypertension     Hypothyroidism     Type 2 diabetes mellitus without complication ([facility] [address]     :   History reviewed. No pertinent surgical history. Medications:  Scheduled Meds:   aspirin  81 mg Oral Daily    clopidogrel  75 mg Oral Daily    nicotine  1 patch TransDERmal Daily    sodium chloride flush  5-40 mL IntraVENous 2 times per day    heparin (porcine)  5,000 Units SubCUTAneous TID    amLODIPine  10 mg Oral Daily    atorvastatin  40 mg Oral Daily    busPIRone  15 mg Oral BID    levothyroxine  88 mcg Oral Daily    metoprolol tartrate  50 mg Oral BID    sertraline  200 mg Oral Daily    miconazole   Topical BID     Continuous Infusions:   dextrose 75 mL/hr at 22    sodium chloride      dextrose       PRN Meds:. HYDROcodone-acetaminophen, sodium chloride flush, sodium chloride, ondansetron **OR** ondansetron, polyethylene glycol, acetaminophen **OR** acetaminophen, glucose, dextrose bolus **OR** dextrose bolus, glucagon (rDNA), dextrose, LORazepam    Allergies   Allergen Reactions    Seasonal      Social History     Socioeconomic History    Marital status:      Spouse name: Not on file    Number of children: Not on file    Years of education: Not on file    Highest education level: Not on file   Occupational History    Not on file   Tobacco Use    Smoking status: Current Every Day Smoker     Packs/day: 1.00     Years: 59.00     Pack years: 59.00     Types: Cigarettes     Start date: 1962    Smokeless tobacco: Never Used   Vaping Use    Vaping Use: Never used   Substance and Sexual Activity    Alcohol use: No    Drug use: Not on file    Sexual activity: Not on file   Other Topics Concern    Not on file   Social History Narrative    Not on file     Social Determinants of Health     Financial Resource Strain: Low Risk     Difficulty of Paying Living Expenses: Not very hard   Food Insecurity: No Food Insecurity    Worried About Running Out of Food in the Last Year: Never true    Imelda of Food in the Last Year: Never true   Transportation Needs:     Lack of Transportation (Medical): Not on file    Lack of Transportation (Non-Medical):  Not on file   Physical Activity:     Days of Exercise per Week: Not on file    Minutes of Exercise per Session: Not on file   Stress:     Feeling of Stress : Not on file   Social Connections:     Frequency of Communication with Friends and Family: Not on file    Frequency of Social Gatherings with Friends and Family: Not on file    Attends Buddhism Services: Not on file    Active Member of [address] [place] [address] [employer] or Organizations: Not on file    Attends Club or Organization Meetings: Not on file    Marital Status: Not on file   Intimate Partner Violence:     Fear of Current or Ex-Partner: Not on file    Emotionally Abused: Not on file    Physically Abused: Not on file    Sexually Abused: Not on file   Housing Stability:     Unable to Pay for Housing in the Last Year: Not on file    Number of [place] in the Last Year: Not on file    Unstable Housing in the Last Year: Not on file      Family History   Problem Relation Age of Onset    Diabetes Father     High Blood Pressure Father     Kidney Disease Father     Cancer Brother         unknown origin    Stroke Maternal Grandmother     Cancer Paternal Grandmother     Other Mother         hysterectomy    No Known Problems Brother          ROS (10 systems)  In addition to that documented in the HPI above, the additional ROS was obtained:  Constitutional: Denies fevers or chills  Eyes: Denies vision changes  ENMT: Denies sore throat  CV: Denies chest pain  Resp: Denies SOB  GI: Denies vomiting or diarrhea  : Denies painful urination  MSK: Denies recent trauma  Skin: Denies new rashes  Neuro: Dizziness and slurred speech  Endocrine: Denies unexpected weight loss  Heme: Denies bleeding disorders    Physical Exam:      Vitals:    06/03/22 2354 06/03/22 2359 06/04/22 0745 06/04/22 0845   BP: 110/64  127/63    Pulse: 60 51 54 56   Resp: 14  19    Temp: 97.6 °F (36.4 °C)  97.2 °F (36.2 °C)    TempSrc: Axillary  Oral    SpO2: 94%  96% 96%   Weight:   198 lb 9.6 oz (90.1 kg)    Height:           Wt Readings from Last 3 Encounters:   06/04/22 198 lb 9.6 oz (90.1 kg)   06/01/22 200 lb (90.7 kg)   04/22/22 199 lb (90.3 kg)     Temp Readings from Last 3 Encounters:   06/04/22 97.2 °F (36.2 °C) (Oral)   12/29/21 96.8 °F (36 °C)   12/07/21 96.6 °F (35.9 °C) (Infrared)     BP Readings from Last 3 Encounters:   06/04/22 127/63   04/22/22 130/74   12/29/21 134/76     Pulse Readings from Last 3 Encounters:   06/04/22 56   04/22/22 57   12/29/21 73        Gen: A&O x 4, NAD, cooperative  HEENT: NC/AT, EOMI, PERRL, mmm, neck supple, no meningeal signs;    Heart: SR  Lungs: Respirations Unlabored  Ext: no edema, no calf tenderness b/l  Psych: normal mood and affect  Skin: no rashes or lesions    NEUROLOGIC EXAM:    Mental Status: A&O to self, location, month and year, NAD, speech mildly slurred clear, language fluent, repetition and naming intact, follows commands appropriately    Cranial Nerve Exam:   CN II-XII: PERRL, VFF, no nystagmus, no gaze paresis, sensation V1-V3 intact b/l, muscles of facial expression symmetric; hearing intact to conversational tone, palate elevates symmetrically, shoulder elevation symmetric and tongue protrudes midline with movement side to side. Motor Exam:       Strength 5/5 UE's/LE's b/l  Tone and bulk normal   No pronator drift    Deep Tendon Reflexes: 2/4 biceps, triceps, brachioradialis, patellar, and achilles b/l; flexor plantar responses b/l    Sensation: Intact light touch/vibration UE's/LE's b/l    Coordination/Cerebellum:       Tremors--none      Rapidly alternating movements: mild dysdiadochokinesia LUE            Heel-to-Shin: no dysmetria b/l      Finger-to-Nose: no dysmetria b/l    Gait and stance:      Gait: deferred      LABS:        CBC:   Recent Labs     06/02/22  0302   WBC 7.5   RBC 4.67   HGB 15.2   HCT 47.6*      .9*     BMP:    Recent Labs     06/04/22  0706   *   K 4.2      CO2 16*   BUN 46*   CREATININE 1.6*   GLUCOSE 124*   CALCIUM 8.1*     2D echo      Summary   Trace aortic regurgitation is noted. Left ventricular systolic function is normal.   Ejection fraction is visually estimated at 55%. Moderate left ventricular hypertrophy. No evidence of any pericardial effusion. IMAGING:    CTH     Impression   No acute intracranial abnormality     MRI     Impression   1. Patient motion limits evaluation. 2. There are 2 tiny acute to subacute infarcts involving the left cerebellum   as well as a tiny acute infarct involving the left parietal lobe cortex.  No   significant mass effect or midline shift. 3. Otherwise, no acute intracranial abnormality. 4. Chronic lacunar infarcts involving the cerebellar hemispheres bilaterally. 5. Mild global parenchymal volume loss with mild-to-moderate chronic   microvascular ischemic changes. MRA ordered    Above imaging personally reviewed in detail.   ASSESSMENT/PLAN:     68 y.o. -female with PMH of CKD, RICH, HTN, T2DM and tobacco use of presenting to [facility] for unresponsive episode, as she was reportedly awake, but not responding. Neurology consulted for further evaluation for strokelike symptoms. 1. 2 areas of acute/subacute infarcts in the left cerebellum and left parietal lobe cortex. 2. Chronic lacunar infarct in the cerebellar hemisphere bilaterally, consider posterior stenosis and/or cardiombolic   -- Medications; aspirin, Plavix, statin for 21 days for secondary stroke prevention, then transition to aspirin, and statin. -- Stroke risk factors: Counseled on smoking cessation, diabetes and blood pressure control along with diet modifications and activity increasing.   -- Further recommendations pending MRA    Neurodiagnostics  --CT as above  --CTA of head/ neck as above  --MRI brain as above  -- 2D echo no bubble study completed  -- Recommend cardiology input for possible cardiac event monitor, as her acute strokes are concerning for cardiogenic in nature        Thank you for allowing us to participate in the care of your patient. If there are any questions regarding evaluation please feel free to contact us.            Cathlene Schaumann, SILVIA - LUCÍA, 6/4/2022

## 2022-06-04 NOTE — PROGRESS NOTES
Hospitalist Progress Note      Name:  Anastasia Munoz /Age/Sex: 1945  (68 y.o. female)   MRN & CSN:  [id number] & [id number] Admission Date/Time: 2022  6:54 PM   Location:  [address] [place] [address] PCP: Cami Duran MD         Hospital Day: 5      Assessment and Plan:     Patient is a 49-year-old female past medical history of hypertension, CKD, diabetes, hyperlipidemia, depression, was admitted for dizziness, acute kidney injury and elevated troponins. Patient kidney function is improving. Anticipate discharge to rehab pending MRA results and neurology clearance. #.  MRI evidence of acute/subacute stroke. MRI obtained yesterday reviewed and shows acute/subacute stroke  A1c, lipid panel ordered  Continue neurochecks per stroke protocol  MRA head and neck pending  Continue statin  Continue aspirin and Plavix  Neurology following  Will need event monitor at discharge per neurology. #.  RICH on CKD  Creatinine continues to improve. Nephrotoxic medications and continue to monitor renal functions close  Nephrology following     #.  Mild hyperkalemia     #. Detectable troponin  -Patient denying any chest pain, shortness of breath, no history of CAD  -Could be secondary to worsening renal function  -Serial troponins, repeat EKG  -2D echo: EF 55% with no regional wall abnormality,  Stress test read did not show any evidence of reversible chronic ischemia.  -Consult following. #.  Non-anion gap metabolic acidosis could be secondary to worsening renal function  . IV fluids for nephrology     #. Hypoglycemia: Improved  Continue dextrose containing fluids per nephrology  Obtain urine culture, blood cultures        #. Fall  -CT head, CT C-spine-no acute process  -X-ray-left [id number] acute fractures  -PT/OT evaluation. #.  Dizziness: Improved  -Patient admits to having dizziness since last Thursday, unsteady gait  -CT head-no acute process  -MRI shows stroke. -PT/OT evaluation.      #. Hypertension  -Patient is on metoprolol, lisinopril, Norvasc, doxazosin, spironolactone, chlorthalidone  -Hold lisinopril, spironolactone, chlorthalidone, doxazosin     #. Diabetes mellitus type 2, not on long-term insulin  -Patient is on glimepiride, pioglitazone, Farxiga-hold  -Hypoglycemia protocol ordered  -HbA1c-six-point 2-4/2020 2/2022     #. Hyperlipidemia  -Patient is on atorvastatin     #. Hypothyroidism-continue levothyroxine     #. Depression/anxiety/agoraphobia  -Patient is on buspirone, lorazepam, sertraline     #. CKD stage IV     #. Chronic tobacco dependence     DVT prophylaxis: Heparin  CODE STATUS: Total support    Subjective. :     Patient was seen and examined today. No acute events overnight. Patient complains of nausea this morning. Objective:   Vitals:   Vitals:    06/04/22 1036   BP:    Pulse:    Resp: 18   Temp:    SpO2:          Physical Exam:   GEN: Awake, alert, in no apparent distress awake female, sitting upright in bed in no apparent distress. Appears given age. HEENT: Atraumatic, normocephalic, PERRLA, EOMI  NECK: Supple, no apparent thyromegaly or masses. RESP: Clear lungs to auscultation  CARDIO/VASC: Regular rate and rhythm, normal S1, S2, no murmurs  GI: Abdomen is soft, nontender, no significant organomegaly noted, bowel sounds present  MSK: No gross joint deformities.   Skin: No significant rashes, skin lesions noted  Neuro: AOx3, cranial nerves grossly intact, no focal motor or sensory deficits   PSYCH: Affect appropriate    Medications:   Medications:    magnesium sulfate  2,000 mg IntraVENous Once    aspirin  81 mg Oral Daily    clopidogrel  75 mg Oral Daily    nicotine  1 patch TransDERmal Daily    sodium chloride flush  5-40 mL IntraVENous 2 times per day    heparin (porcine)  5,000 Units SubCUTAneous TID    amLODIPine  10 mg Oral Daily    atorvastatin  40 mg Oral Daily    busPIRone  15 mg Oral BID    levothyroxine  88 mcg Oral Daily    metoprolol tartrate  50 mg Oral BID    sertraline  200 mg Oral Daily    miconazole   Topical BID      Infusions:    dextrose 75 mL/hr at 06/02/22 1927    sodium chloride      dextrose       PRN Meds: HYDROcodone-acetaminophen, 1 tablet, Q8H PRN  sodium chloride flush, 5-40 mL, PRN  sodium chloride, , PRN  ondansetron, 4 mg, Q8H PRN   Or  ondansetron, 4 mg, Q6H PRN  polyethylene glycol, 17 g, Daily PRN  acetaminophen, 650 mg, Q6H PRN   Or  acetaminophen, 650 mg, Q6H PRN  glucose, 4 tablet, PRN  dextrose bolus, 125 mL, PRN   Or  dextrose bolus, 250 mL, PRN  glucagon (rDNA), 1 mg, PRN  dextrose, 100 mL/hr, PRN  LORazepam, 1 mg, Q8H PRN          Electronically signed by Bala Dorman MD on 6/4/2022 at 12:14 PM

## 2022-06-05 PROCEDURE — 1200000000 HC SEMI PRIVATE

## 2022-06-05 PROCEDURE — 94761 N-INVAS EAR/PLS OXIMETRY MLT: CPT

## 2022-06-05 PROCEDURE — 2500000003 HC RX 250 WO HCPCS: Performed by: HOSPITALIST

## 2022-06-05 PROCEDURE — 6370000000 HC RX 637 (ALT 250 FOR IP): Performed by: HOSPITALIST

## 2022-06-05 PROCEDURE — 6360000002 HC RX W HCPCS: Performed by: INTERNAL MEDICINE

## 2022-06-05 PROCEDURE — 2580000003 HC RX 258: Performed by: INTERNAL MEDICINE

## 2022-06-05 PROCEDURE — 6370000000 HC RX 637 (ALT 250 FOR IP): Performed by: NURSE PRACTITIONER

## 2022-06-05 PROCEDURE — 6370000000 HC RX 637 (ALT 250 FOR IP): Performed by: INTERNAL MEDICINE

## 2022-06-05 PROCEDURE — 99233 SBSQ HOSP IP/OBS HIGH 50: CPT | Performed by: NURSE PRACTITIONER

## 2022-06-05 PROCEDURE — 87086 URINE CULTURE/COLONY COUNT: CPT

## 2022-06-05 RX ORDER — CLOPIDOGREL BISULFATE 75 MG/1
75 TABLET ORAL DAILY
Status: CANCELLED | OUTPATIENT
Start: 2022-06-05 | End: 2022-06-26

## 2022-06-05 RX ORDER — AMLODIPINE BESYLATE 10 MG/1
10 TABLET ORAL DAILY
Status: CANCELLED | OUTPATIENT
Start: 2022-06-05

## 2022-06-05 RX ORDER — LEVOTHYROXINE SODIUM 88 UG/1
88 TABLET ORAL DAILY
Status: CANCELLED | OUTPATIENT
Start: 2022-06-05

## 2022-06-05 RX ORDER — BUSPIRONE HYDROCHLORIDE 15 MG/1
15 TABLET ORAL 2 TIMES DAILY
Status: CANCELLED | OUTPATIENT
Start: 2022-06-05

## 2022-06-05 RX ORDER — METOPROLOL TARTRATE 50 MG/1
50 TABLET, FILM COATED ORAL 2 TIMES DAILY
Status: CANCELLED | OUTPATIENT
Start: 2022-06-05

## 2022-06-05 RX ORDER — ATORVASTATIN CALCIUM 40 MG/1
40 TABLET, FILM COATED ORAL DAILY
Status: CANCELLED | OUTPATIENT
Start: 2022-06-05

## 2022-06-05 RX ORDER — ASPIRIN 81 MG/1
81 TABLET, CHEWABLE ORAL DAILY
Status: CANCELLED | OUTPATIENT
Start: 2022-06-05

## 2022-06-05 RX ADMIN — SODIUM CHLORIDE, PRESERVATIVE FREE 10 ML: 5 INJECTION INTRAVENOUS at 09:11

## 2022-06-05 RX ADMIN — MICONAZOLE NITRATE: 2 POWDER TOPICAL at 21:17

## 2022-06-05 RX ADMIN — LORAZEPAM 1 MG: 1 TABLET ORAL at 21:11

## 2022-06-05 RX ADMIN — HEPARIN SODIUM 5000 UNITS: 5000 INJECTION INTRAVENOUS; SUBCUTANEOUS at 19:53

## 2022-06-05 RX ADMIN — ATORVASTATIN CALCIUM 40 MG: 40 TABLET, FILM COATED ORAL at 09:10

## 2022-06-05 RX ADMIN — BUSPIRONE HYDROCHLORIDE 15 MG: 15 TABLET ORAL at 09:10

## 2022-06-05 RX ADMIN — ASPIRIN 81 MG 81 MG: 81 TABLET ORAL at 09:10

## 2022-06-05 RX ADMIN — CLOPIDOGREL BISULFATE 75 MG: 75 TABLET ORAL at 09:10

## 2022-06-05 RX ADMIN — HEPARIN SODIUM 5000 UNITS: 5000 INJECTION INTRAVENOUS; SUBCUTANEOUS at 09:10

## 2022-06-05 RX ADMIN — BUSPIRONE HYDROCHLORIDE 15 MG: 15 TABLET ORAL at 21:11

## 2022-06-05 RX ADMIN — SERTRALINE HYDROCHLORIDE 200 MG: 50 TABLET ORAL at 09:10

## 2022-06-05 RX ADMIN — HYDROCODONE BITARTRATE AND ACETAMINOPHEN 1 TABLET: 5; 325 TABLET ORAL at 09:10

## 2022-06-05 RX ADMIN — LEVOTHYROXINE SODIUM 88 MCG: 0.09 TABLET ORAL at 05:49

## 2022-06-05 RX ADMIN — AMLODIPINE BESYLATE 10 MG: 10 TABLET ORAL at 09:09

## 2022-06-05 RX ADMIN — MICONAZOLE NITRATE: 2 POWDER TOPICAL at 09:12

## 2022-06-05 RX ADMIN — DEXTROSE MONOHYDRATE 100 ML/HR: 50 INJECTION, SOLUTION INTRAVENOUS at 12:42

## 2022-06-05 RX ADMIN — METOPROLOL TARTRATE 50 MG: 50 TABLET, FILM COATED ORAL at 09:10

## 2022-06-05 RX ADMIN — METOPROLOL TARTRATE 50 MG: 50 TABLET, FILM COATED ORAL at 21:11

## 2022-06-05 ASSESSMENT — PAIN DESCRIPTION - LOCATION: LOCATION: FOOT;KNEE

## 2022-06-05 ASSESSMENT — PAIN SCALES - GENERAL: PAINLEVEL_OUTOF10: 5

## 2022-06-05 ASSESSMENT — PAIN DESCRIPTION - PAIN TYPE: TYPE: CHRONIC PAIN

## 2022-06-05 ASSESSMENT — PAIN DESCRIPTION - DESCRIPTORS: DESCRIPTORS: ACHING

## 2022-06-05 ASSESSMENT — PAIN DESCRIPTION - ORIENTATION: ORIENTATION: RIGHT;LEFT

## 2022-06-05 NOTE — DISCHARGE SUMMARY
recommendations, medications, and plan.      Consults this admission:  IP CONSULT TO HOSPITALIST  IP CONSULT TO NEPHROLOGY  IP CONSULT TO CARDIOLOGY  IP CONSULT TO NEUROLOGY  IP CONSULT TO CARDIOLOGY  IP CONSULT TO CARDIOLOGY    Discharge Instruction:   Follow up appointments:   Primary care physician:  within 2 weeks    Diet:  cardiac diet   Activity: activity as tolerated  Disposition: Discharged to:   []Home, [][facility], []SNF, [x]Acute Rehab, []Hospice   Condition on discharge: Stable    Discharge Medications:        Medication List      ASK your doctor about these medications    amLODIPine 10 MG tablet  Commonly known as: NORVASC  Take 1 tablet by mouth daily     atorvastatin 40 MG tablet  Commonly known as: LIPITOR  TAKE ONE TABLET BY MOUTH DAILY     busPIRone 15 MG tablet  Commonly known as: BUSPAR  Take 15 mg by mouth 3 times daily     chlorthalidone 25 MG tablet  Commonly known as: HYGROTON  TAKE ONE TABLET BY MOUTH DAILY     dapagliflozin 5 MG tablet  Commonly known as: Farxiga  Take 1 tablet by mouth every morning     doxazosin 2 MG tablet  Commonly known as: Cardura  Take 1 tablet by mouth daily     glimepiride 4 MG tablet  Commonly known as: AMARYL  Take 1 tablet by mouth in the morning and at bedtime     levothyroxine 88 MCG tablet  Commonly known as: SYNTHROID  Take 1 tablet by mouth Daily     lisinopril 20 MG tablet  Commonly known as: PRINIVIL;ZESTRIL  Take 1 tablet by mouth 2 times daily     LORazepam 1 MG tablet  Commonly known as: ATIVAN     metoprolol tartrate 50 MG tablet  Commonly known as: LOPRESSOR  TAKE ONE TABLET BY MOUTH TWICE A DAY     pioglitazone 30 MG tablet  Commonly known as: ACTOS  Take 1 tablet by mouth daily     sertraline 25 MG tablet  Commonly known as: ZOLOFT     spironolactone 100 MG tablet  Commonly known as: Aldactone  Take 1 tablet by mouth daily            Objective Findings at Discharge:   /71   Pulse 61   Temp 97.8 °F (36.6 °C) (Oral)   Resp 18   Ht 5' 5\" (1.651 m) Wt 198 lb 9.6 oz (90.1 kg)   SpO2 93%   BMI 33.05 kg/m²            PHYSICAL EXAM   GEN Awake, Alert, in no apparent distress  HEENT Atraumatic, nomocephalic, PERRLA, EOMI  NECK Supple, no lymphadenopaty  RESP Clear lungs to auscultation bilaterally  CARDIO/VASC Normal S1/S2. RRRR. No mumurs. GI Abdomen is soft without significant tenderness, masses, or guarding. Bowel sounds +  MSK No gross joint deformities. SKIN Normal coloration, warm, dry. NEURO Cranial nerves appear grossly intact, normal speech, no lateralizing weakness. PSYCH Awake, alert, oriented x 3. Affect appropriate.     BMP/CBC  Recent Labs     06/04/22  0706   *   K 4.2      CO2 16*   BUN 46*   CREATININE 1.6*       IMAGING:      Discharge Time of 35 minutes    Electronically signed by Sadiq Saxena MD on 6/5/2022 at 12:37 PM

## 2022-06-05 NOTE — DISCHARGE INSTR - COC
Continuity of Care Form    Patient Name: Abbey Goodpasture   :  1945  MRN:  [id number]    Admit date:  2022  Discharge date:  2022    Code Status Order: Full Code   Advance Directives:      Admitting Physician:  Kala Friend MD  PCP: Silvana Ken MD    Discharging Nurse: [facility] Unit/Room#: 8111/3059-N  Discharging Unit Phone Number: [phone]    Emergency Contact:   Extended Emergency Contact Information  Primary Emergency Contact: Per Patient,None  Address: [address] [place] [address] Phone: [phone]  Relation: None    Past Surgical History:  History reviewed. No pertinent surgical history. Immunization History:   Immunization History   Administered Date(s) Administered    COVID-19, Pfizer Purple top, DILUTE for use, 12+ yrs, 30mcg/0.3mL dose 2021, 07/15/2021    Influenza Vaccine, unspecified formulation 2018    Influenza, Quadv, adjuvanted, 65 yrs +, IM, PF (Fluad) 2020, 2021       Active Problems:  Patient Active Problem List   Diagnosis Code    Stage 4 chronic kidney disease ([facility] [address] N18.4    Type 2 diabetes mellitus with stage 4 chronic kidney disease, without long-term current use of insulin (HCC) E11.22, N18.4    HTN (hypertension) I10    Hyperlipidemia E78.5    Hypothyroidism E03.9    Anxiety disorder F41.9    Tobacco abuse Z72.0    Chronic depression F32. A    RLS (restless legs syndrome) G25.81    Arthritis of both knees M17.0    Generalized weakness R53.1    Ischemic stroke ([facility]) I63.9       Isolation/Infection:   Isolation            No Isolation          Patient Infection Status       None to display            Nurse Assessment:  Last Vital Signs: /71   Pulse 61   Temp 97.8 °F (36.6 °C) (Oral)   Resp 18   Ht 5' 5\" (1.651 m)   Wt 198 lb 9.6 oz (90.1 kg)   SpO2 93%   BMI 33.05 kg/m²     Last documented pain score (0-10 scale): Pain Level: 5  Last Weight:    Wt Readings from Last 1 Encounters:   06/04/22 198 lb 9.6 oz (90.1 kg)     Mental Status:  oriented, alert, coherent, logical, and thought processes intact    IV Access:  - Peripheral IV - site  R Upper Arm, insertion date: 6/4/2022    Nursing Mobility/ADLs:  Walking   Assisted  Transfer  Assisted  Bathing  Assisted  Dressing  Assisted  Toileting  Assisted  Feeding  Independent  Med Admin  Assisted  Med Delivery   whole    Wound Care Documentation and Therapy:  Wound 06/01/22 Groin Left cluster (Active)   Wound Image   06/01/22 1030   Wound Etiology Other 06/05/22 1033   Dressing Status Reinforced dressing 06/05/22 1033   Wound Cleansed Cleansed with saline 06/05/22 1033   Dressing/Treatment Interdry Ag/wicking fabric with Ag 06/05/22 1033   Wound Length (cm) 2 cm 06/01/22 1030   Wound Width (cm) 4 cm 06/01/22 1030   Wound Depth (cm) 0.1 cm 06/01/22 1030   Wound Surface Area (cm^2) 8 cm^2 06/01/22 1030   Wound Volume (cm^3) 0.8 cm^3 06/01/22 1030   Distance Tunneling (cm) 0 cm 06/03/22 2359   Tunneling Position ___ O'Clock 0 06/03/22 2359   Undermining Starts ___ O'Clock 0 06/03/22 2359   Undermining Ends___ O'Clock 0 06/03/22 2359   Undermining Maxium Distance (cm) 0 06/03/22 2359   Wound Assessment Pink/red 06/05/22 1033   Drainage Amount Moderate 06/05/22 1033   Drainage Description Serosanguinous; Yellow 06/05/22 1033   Odor None 06/05/22 1033   Aminah-wound Assessment Excoriated 06/05/22 1033   Margins Defined edges 06/03/22 2359   Wound Thickness Description not for Pressure Injury Partial thickness 06/03/22 2359   Number of days: 4        Elimination:  Continence: Bowel: Yes  Bladder: Yes  Urinary Catheter:  none    Colostomy/Ileostomy/Ileal Conduit: No       Date of Last BM: 6/5/2022    Intake/Output Summary (Last 24 hours) at 6/5/2022 1327  Last data filed at 6/5/2022 0552  Gross per 24 hour   Intake 240 ml   Output 750 ml   Net -510 ml     I/O last 3 completed shifts:   In: 240 [P.O.:240]  Out: 750 [Urine:750]    Safety Concerns:     History of Falls (last 30 days)    Impairments/Disabilities:      Vision and Hearing    Nutrition Therapy:  Current Nutrition Therapy:   - Oral Diet:  General and Renal    Routes of Feeding: Oral  Liquids: Thin Liquids  Daily Fluid Restriction: no  Last Modified Barium Swallow with Video (Video Swallowing Test): not done    Treatments at the Time of Hospital Discharge:   Respiratory Treatments:   Oxygen Therapy:  is not on home oxygen therapy. Ventilator:    - No ventilator support    Rehab Therapies: Physical Therapy and Occupational Therapy  Weight Bearing Status/Restrictions: No weight bearing restrictions  Other Medical Equipment (for information only, NOT a DME order):  walker  Other Treatments: ***    Patient's personal belongings (please select all that are sent with patient):  None    RN SIGNATURE:  {Esignature:[id number]}    CASE MANAGEMENT/SOCIAL WORK SECTION    Inpatient Status Date: ***    Readmission Risk Assessment Score:  Readmission Risk              Risk of Unplanned Readmission:  20           Discharging to Facility/ Agency   Name:   Address:  Phone:  Fax:    Dialysis Facility (if applicable)   Name:  Address:  Dialysis Schedule:  Phone:  Fax:    / signature: {Esignature:[id number]}    PHYSICIAN SECTION    Prognosis: {Prognosis:1800934530}    Condition at Discharge: [facility] Patient Condition:909000993}    Rehab Potential (if transferring to Rehab): {Prognosis:8252981918}    Recommended Labs or Other Treatments After Discharge: ***    Physician Certification: I certify the above information and transfer of Carmine Hoang  is necessary for the continuing treatment of the diagnosis listed and that she requires {Admit to Appropriate Level of Care:63500} for {GREATER/LESS:804739558} 30 days.      Update Admission H&P: {CHP DME Changes in MVCTZ:791867199}    PHYSICIAN SIGNATURE:  {Esignature:[id number]}

## 2022-06-05 NOTE — PROGRESS NOTES
Neurology Service Progress Note   [facility]  Patient Name: Yin Anthony  : 1945        Subjective:   Reason for consult: \"acute stroke\"  Patient seen and examined. Chart reviewed in detail. Patient has had no acute changes overnight  We reviewed her MRA and I explained the need for her to work on stroke risk factors while also having a cardio-embolic work up completed outpatient. She denies CP and SOB  No new focal weaknesses     Past Medical History:   Diagnosis Date    Acute kidney failure ([facility] [address] 2018    Chronic kidney disease     Hypertension     Hypothyroidism     Type 2 diabetes mellitus without complication ([facility] [address]     :   History reviewed. No pertinent surgical history. Medications:  Scheduled Meds:   aspirin  81 mg Oral Daily    clopidogrel  75 mg Oral Daily    nicotine  1 patch TransDERmal Daily    sodium chloride flush  5-40 mL IntraVENous 2 times per day    heparin (porcine)  5,000 Units SubCUTAneous TID    amLODIPine  10 mg Oral Daily    atorvastatin  40 mg Oral Daily    busPIRone  15 mg Oral BID    levothyroxine  88 mcg Oral Daily    metoprolol tartrate  50 mg Oral BID    sertraline  200 mg Oral Daily    miconazole   Topical BID     Continuous Infusions:   dextrose 75 mL/hr at 22 2233    sodium chloride      dextrose       PRN Meds:. HYDROcodone-acetaminophen, sodium chloride flush, sodium chloride, ondansetron **OR** ondansetron, polyethylene glycol, acetaminophen **OR** acetaminophen, glucose, dextrose bolus **OR** dextrose bolus, glucagon (rDNA), dextrose, LORazepam    Allergies   Allergen Reactions    Seasonal      Social History     Socioeconomic History    Marital status:      Spouse name: Not on file    Number of children: Not on file    Years of education: Not on file    Highest education level: Not on file   Occupational History    Not on file   Tobacco Use    Smoking status: Current Every Day Smoker     Packs/day: 1.00     Years: 59.00     Pack years: 59.00     Types: Cigarettes     Start date: 1962    Smokeless tobacco: Never Used   Vaping Use    Vaping Use: Never used   Substance and Sexual Activity    Alcohol use: No    Drug use: Not on file    Sexual activity: Not on file   Other Topics Concern    Not on file   Social History Narrative    Not on file     Social Determinants of Health     Financial Resource Strain: Low Risk     Difficulty of Paying Living Expenses: Not very hard   Food Insecurity: No Food Insecurity    Worried About Running Out of Food in the Last Year: Never true    Imelda of Food in the Last Year: Never true   Transportation Needs:     Lack of Transportation (Medical): Not on file    Lack of Transportation (Non-Medical):  Not on file   Physical Activity:     Days of Exercise per Week: Not on file    Minutes of Exercise per Session: Not on file   Stress:     Feeling of Stress : Not on file   Social Connections:     Frequency of Communication with Friends and Family: Not on file    Frequency of Social Gatherings with Friends and Family: Not on file    Attends Oriental orthodox Services: Not on file    Active Member of [address] [place] [address] or Organizations: Not on file    Attends Club or Organization Meetings: Not on file    Marital Status: Not on file   Intimate Partner Violence:     Fear of Current or Ex-Partner: Not on file    Emotionally Abused: Not on file    Physically Abused: Not on file    Sexually Abused: Not on file   Housing Stability:     Unable to Pay for Housing in the Last Year: Not on file    Number of [place] in the Last Year: Not on file    Unstable Housing in the Last Year: Not on file      Family History   Problem Relation Age of Onset    Diabetes Father     High Blood Pressure Father     Kidney Disease Father     Cancer Brother         unknown origin    Stroke Maternal Grandmother     Cancer Paternal Grandmother     Other Mother         hysterectomy    No Known Problems Brother          ROS (10 systems)  In addition to that documented in the HPI above, the additional ROS was obtained:  Constitutional: Denies fevers or chills  Eyes: Denies vision changes  ENMT: Denies sore throat  CV: Denies chest pain  Resp: Denies SOB  GI: Denies vomiting or diarrhea  : Denies painful urination  MSK: Denies recent trauma  Skin: Denies new rashes  Neuro: Dizziness and slurred speech  Endocrine: Denies unexpected weight loss  Heme: Denies bleeding disorders    Physical Exam:      Vitals:    06/04/22 1036 06/04/22 1459 06/04/22 2058 06/05/22 0640   BP:  (!) 117/49 136/66 (!) 121/58   Pulse:  55 59 60   Resp: 18 16 16    Temp:  97.9 °F (36.6 °C) 98.1 °F (36.7 °C) 98.4 °F (36.9 °C)   TempSrc:   Oral    SpO2:  98% 92% 94%   Weight:       Height:           Wt Readings from Last 3 Encounters:   06/04/22 198 lb 9.6 oz (90.1 kg)   06/01/22 200 lb (90.7 kg)   04/22/22 199 lb (90.3 kg)     Temp Readings from Last 3 Encounters:   06/05/22 98.4 °F (36.9 °C)   12/29/21 96.8 °F (36 °C)   12/07/21 96.6 °F (35.9 °C) (Infrared)     BP Readings from Last 3 Encounters:   06/05/22 (!) 121/58   04/22/22 130/74   12/29/21 134/76     Pulse Readings from Last 3 Encounters:   06/05/22 60   04/22/22 57   12/29/21 73        Gen: A&O x 4, NAD, cooperative  HEENT: NC/AT, EOMI, PERRL, mmm, neck supple, no meningeal signs;    Heart: SR  Lungs: Respirations Unlabored  Ext: no edema, no calf tenderness b/l  Psych: normal mood and affect  Skin: no rashes or lesions    NEUROLOGIC EXAM:    Mental Status: A&O to self, location, month and year, NAD, speech mildly slurred clear, language fluent, repetition and naming intact, follows commands appropriately    Cranial Nerve Exam:   CN II-XII: PERRL, VFF, no nystagmus, no gaze paresis, sensation V1-V3 intact b/l, muscles of facial expression symmetric; hearing intact to conversational tone, palate elevates symmetrically, shoulder elevation symmetric and tongue protrudes midline with movement side to side. Motor Exam:       Strength 5/5 UE's/LE's b/l  Tone and bulk normal   No pronator drift    Deep Tendon Reflexes: 2/4 biceps, triceps, brachioradialis, patellar, and achilles b/l; flexor plantar responses b/l    Sensation: Intact light touch/vibration UE's/LE's b/l    Coordination/Cerebellum:       Tremors--none      Rapidly alternating movements: mild dysdiadochokinesia LUE            Heel-to-Shin: no dysmetria b/l      Finger-to-Nose: no dysmetria b/l    Gait and stance:      Gait: deferred      LABS:        CBC:   No results for input(s): WBC, RBC, HGB, HCT, PLT, MCV in the last 72 hours. BMP:    Recent Labs     06/04/22  0706   *   K 4.2      CO2 16*   BUN 46*   CREATININE 1.6*   GLUCOSE 124*   CALCIUM 8.1*     2D echo      Summary   Trace aortic regurgitation is noted. Left ventricular systolic function is normal.   Ejection fraction is visually estimated at 55%. Moderate left ventricular hypertrophy. No evidence of any pericardial effusion. IMAGING:    CTH     Impression   No acute intracranial abnormality     MRI     Impression   1. Patient motion limits evaluation. 2. There are 2 tiny acute to subacute infarcts involving the left cerebellum   as well as a tiny acute infarct involving the left parietal lobe cortex.  No   significant mass effect or midline shift. 3. Otherwise, no acute intracranial abnormality. 4. Chronic lacunar infarcts involving the cerebellar hemispheres bilaterally. 5. Mild global parenchymal volume loss with mild-to-moderate chronic   microvascular ischemic changes. MRA head and neck: unremarkable     Above imaging personally reviewed in detail. ASSESSMENT/PLAN:     68 y.o. -female with PMH of CKD, RICH, HTN, T2DM and tobacco use of presenting to [facility] for unresponsive episode, as she was reportedly awake, but not responding.   Neurology consulted for further evaluation for strokelike symptoms. 1. 2 areas of acute/subacute infarcts in the left cerebellum and left parietal lobe cortex. 2. Chronic lacunar infarct in the cerebellar hemisphere bilaterally, consider posterior stenosis and/or cardiombolic   -- Medications; aspirin, Plavix, statin for 21 days for secondary stroke prevention, then transition to aspirin, and statin. -- Stroke risk factors: Counseled on smoking cessation, diabetes and blood pressure control along with diet modifications and activity increasing. Neurodiagnostics  --CT as above  --CTA of head/ neck as above  --MRI brain as above  --MRA head and neck as above   -- 2D echo no bubble study completed    -- Recommend cardioembolic work up outpatient and additional follow up in our office, we will sign off. Thank you for allowing us to participate in the care of your patient. If there are any questions regarding evaluation please feel free to contact us.            SILVIA Spann - LUCÍA, 6/5/2022

## 2022-06-05 NOTE — PLAN OF CARE
Problem: Discharge Planning  Goal: Discharge to home or other facility with appropriate resources  6/5/2022 1322 by Nasreen Miranda RN  Outcome: Adequate for Discharge  6/5/2022 0219 by Thomas White RN  Outcome: Progressing     Problem: Pain  Goal: Verbalizes/displays adequate comfort level or baseline comfort level  6/5/2022 1322 by Nasreen Miranda RN  Outcome: Adequate for Discharge  Flowsheets (Taken 6/5/2022 0900)  Verbalizes/displays adequate comfort level or baseline comfort level:   Encourage patient to monitor pain and request assistance   Assess pain using appropriate pain scale  6/5/2022 0219 by Thomas White RN  Outcome: Progressing     Problem: Chronic Conditions and Co-morbidities  Goal: Patient's chronic conditions and co-morbidity symptoms are monitored and maintained or improved  6/5/2022 1322 by Nasreen Miranda RN  Outcome: Adequate for Discharge  6/5/2022 0219 by Thomas White RN  Outcome: Progressing     Problem: Safety - Adult  Goal: Free from fall injury  6/5/2022 1322 by Nasreen Miranda RN  Outcome: Adequate for Discharge  6/5/2022 0219 by Thomas White RN  Outcome: Progressing     Problem: ABCDS Injury Assessment  Goal: Absence of physical injury  6/5/2022 1322 by Nasreen Miranda RN  Outcome: Adequate for Discharge  Flowsheets (Taken 6/5/2022 1320)  Absence of Physical Injury: Implement safety measures based on patient assessment  6/5/2022 0219 by Thomas White RN  Outcome: Progressing     Problem: Skin/Tissue Integrity  Goal: Absence of new skin breakdown  Description: 1. Monitor for areas of redness and/or skin breakdown  2. Assess vascular access sites hourly  3. Every 4-6 hours minimum:  Change oxygen saturation probe site  4. Every 4-6 hours:  If on nasal continuous positive airway pressure, respiratory therapy assess nares and determine need for appliance change or resting period.   6/5/2022 1322 by Nasreen Miranda RN  Outcome: Adequate for Discharge  6/5/2022 1435 by Silviano Kent RN  Outcome: Progressing     Problem: Nutrition Deficit:  Goal: Optimize nutritional status  6/5/2022 1322 by Sharee Ware RN  Outcome: Adequate for Discharge  6/5/2022 0219 by Silviano Kent RN  Outcome: Progressing

## 2022-06-05 NOTE — PROGRESS NOTES
Nephrology Progress Note  6/5/2022 1:42 PM  Subjective: Interval History: Minh Palma is a 68 y.o. female weak resting in bed appetite slightly better today        Data:   Scheduled Meds:   aspirin  81 mg Oral Daily    clopidogrel  75 mg Oral Daily    nicotine  1 patch TransDERmal Daily    sodium chloride flush  5-40 mL IntraVENous 2 times per day    heparin (porcine)  5,000 Units SubCUTAneous TID    amLODIPine  10 mg Oral Daily    atorvastatin  40 mg Oral Daily    busPIRone  15 mg Oral BID    levothyroxine  88 mcg Oral Daily    metoprolol tartrate  50 mg Oral BID    sertraline  200 mg Oral Daily    miconazole   Topical BID     Continuous Infusions:   sodium chloride      dextrose 100 mL/hr (06/05/22 1242)         CBC   No results for input(s): WBC, HGB, HCT, PLT in the last 72 hours. BMP   Recent Labs     06/04/22  0706   *   K 4.2      CO2 16*   PHOS 2.6   BUN 46*   CREATININE 1.6*     Hepatic:   Recent Labs     06/04/22  0706   AST 26   ALT 21   BILITOT 0.3   ALKPHOS 81     Troponin: No results for input(s): TROPONINI in the last 72 hours. BNP: No results for input(s): BNP in the last 72 hours. Lipids:   Recent Labs     06/02/22  2017   CHOL 95   HDL 31*     ABGs: No results found for: PHART, PO2ART, LXX2CAN  INR: No results for input(s): INR in the last 72 hours.   Renal Labs  Albumin:    Lab Results   Component Value Date    LABALBU 3.5 06/04/2022     Calcium:    Lab Results   Component Value Date    CALCIUM 8.1 06/04/2022     Phosphorus:    Lab Results   Component Value Date    PHOS 2.6 06/04/2022     U/A:    Lab Results   Component Value Date    NITRU NEGATIVE 05/31/2022    NITRU Negative 03/15/2018    COLORU YELLOW 05/31/2022    PHUR 6.0 03/15/2018    WBCUA <1 05/31/2022    RBCUA <1 05/31/2022    TRICHOMONAS NONE SEEN 05/31/2022    BACTERIA NEGATIVE 05/31/2022    CLARITYU CLEAR 05/31/2022    SPECGRAV 1.025 05/31/2022    UROBILINOGEN 0.2 05/31/2022    BILIRUBINUR SMALL 05/31/2022    BLOODU MODERATE 05/31/2022    GLUCOSEU Negative 03/15/2018    KETUA TRACE 05/31/2022     ABG:  No results found for: PHART, GVD3QWZ, PO2ART, IPK5XKB, BEART, THGBART, OIK9XFL, L3WHWYWU  HgBA1c:    Lab Results   Component Value Date    LABA1C 6.1 06/02/2022     Microalbumen/Creatinine ratio:  No components found for: RUCREAT  TSH:    Lab Results   Component Value Date    TSH 4.74 04/22/2022     IRON:  No results found for: IRON  Iron Saturation:  No components found for: PERCENTFE  TIBC:  No results found for: TIBC  FERRITIN:  No results found for: FERRITIN  RPR:  No results found for: RPR  JESSEE:  No results found for: ANATITER, JESSEE  24 Hour Urine for Creatinine Clearance:  No components found for: CREAT4, UHRS10, UTV10      Objective:   I/O: 06/04 0701 - 06/05 0700  In: 240 [P.O.:240]  Out: 750 [Urine:750]  I/O last 3 completed shifts: In: 240 [P.O.:240]  Out: 750 [Urine:750]  No intake/output data recorded. Vitals: /71   Pulse 61   Temp 97.8 °F (36.6 °C) (Oral)   Resp 18   Ht 5' 5\" (1.651 m)   Wt 198 lb 9.6 oz (90.1 kg)   SpO2 93%   BMI 33.05 kg/m²  {  General appearance: awake weak  HEENT: Head: Normal, normocephalic, atraumatic.   Neck: supple, symmetrical, trachea midline  Lungs: diminished breath sounds bilaterally  Heart: S1, S2 normal  Abdomen: abnormal findings:  soft nt  Extremities: edema trace  Neurologic: Mental status: alertness: alert but slightly confused        Assessment and Plan:      IMP:  #1 subacute infarct left cerebellum in the setting of chronic lacunar infarct  #2 acute renal failure from ATN on CKD 4  #3 type 2 diabetes  #4 hypertension  #5 metabolic acidosis with starvation ketoacidosis    Plan     #1 monitor neuro status follow recommendations of neurology  #2 creatinine holding at 1.6 will monitor for now  #3 monitor glucose holding stable watch off IV fluids with oral intake  #4 blood pressure stable  #5 monitor acidosis encourage oral hydration start oral bicarb if not improved           Travis Basurto MD, MD

## 2022-06-05 NOTE — PLAN OF CARE

## 2022-06-06 ENCOUNTER — HOSPITAL ENCOUNTER (INPATIENT)
Age: 77
LOS: 5 days | Discharge: HOME HEALTH | DRG: 057 | End: 2022-06-11
Attending: PHYSICAL MEDICINE & REHABILITATION | Admitting: PHYSICAL MEDICINE & REHABILITATION
Payer: MEDICARE

## 2022-06-06 VITALS
WEIGHT: 198.6 LBS | RESPIRATION RATE: 18 BRPM | HEART RATE: 64 BPM | DIASTOLIC BLOOD PRESSURE: 79 MMHG | BODY MASS INDEX: 33.09 KG/M2 | OXYGEN SATURATION: 97 % | SYSTOLIC BLOOD PRESSURE: 143 MMHG | HEIGHT: 65 IN | TEMPERATURE: 97.6 F

## 2022-06-06 PROBLEM — I63.9 ACUTE CVA (CEREBROVASCULAR ACCIDENT) (HCC): Status: ACTIVE | Noted: 2022-06-06

## 2022-06-06 LAB
CULTURE: NORMAL
GLUCOSE BLD-MCNC: 130 MG/DL (ref 70–99)
Lab: NORMAL
SPECIMEN: NORMAL

## 2022-06-06 PROCEDURE — 6370000000 HC RX 637 (ALT 250 FOR IP): Performed by: INTERNAL MEDICINE

## 2022-06-06 PROCEDURE — 1280000000 HC REHAB R&B

## 2022-06-06 PROCEDURE — 6370000000 HC RX 637 (ALT 250 FOR IP): Performed by: PHYSICAL MEDICINE & REHABILITATION

## 2022-06-06 PROCEDURE — 94761 N-INVAS EAR/PLS OXIMETRY MLT: CPT

## 2022-06-06 PROCEDURE — 6360000002 HC RX W HCPCS: Performed by: INTERNAL MEDICINE

## 2022-06-06 PROCEDURE — 6370000000 HC RX 637 (ALT 250 FOR IP): Performed by: HOSPITALIST

## 2022-06-06 PROCEDURE — 2500000003 HC RX 250 WO HCPCS: Performed by: HOSPITALIST

## 2022-06-06 PROCEDURE — 6370000000 HC RX 637 (ALT 250 FOR IP): Performed by: NURSE PRACTITIONER

## 2022-06-06 PROCEDURE — 82962 GLUCOSE BLOOD TEST: CPT

## 2022-06-06 PROCEDURE — 2580000003 HC RX 258: Performed by: INTERNAL MEDICINE

## 2022-06-06 PROCEDURE — 6360000002 HC RX W HCPCS: Performed by: PHYSICAL MEDICINE & REHABILITATION

## 2022-06-06 PROCEDURE — 99223 1ST HOSP IP/OBS HIGH 75: CPT | Performed by: PHYSICAL MEDICINE & REHABILITATION

## 2022-06-06 RX ORDER — ONDANSETRON 4 MG/1
4 TABLET, ORALLY DISINTEGRATING ORAL 4 TIMES DAILY PRN
Status: DISCONTINUED | OUTPATIENT
Start: 2022-06-06 | End: 2022-06-11 | Stop reason: HOSPADM

## 2022-06-06 RX ORDER — NICOTINE 21 MG/24HR
1 PATCH, TRANSDERMAL 24 HOURS TRANSDERMAL DAILY
Status: DISCONTINUED | OUTPATIENT
Start: 2022-06-06 | End: 2022-06-11 | Stop reason: HOSPADM

## 2022-06-06 RX ORDER — HYDROCODONE BITARTRATE AND ACETAMINOPHEN 5; 325 MG/1; MG/1
1 TABLET ORAL EVERY 6 HOURS PRN
Status: DISCONTINUED | OUTPATIENT
Start: 2022-06-06 | End: 2022-06-10

## 2022-06-06 RX ORDER — AMLODIPINE BESYLATE 10 MG/1
10 TABLET ORAL DAILY
Status: DISCONTINUED | OUTPATIENT
Start: 2022-06-07 | End: 2022-06-11 | Stop reason: HOSPADM

## 2022-06-06 RX ORDER — SENNA PLUS 8.6 MG/1
1 TABLET ORAL NIGHTLY
Status: DISCONTINUED | OUTPATIENT
Start: 2022-06-06 | End: 2022-06-11 | Stop reason: HOSPADM

## 2022-06-06 RX ORDER — POLYETHYLENE GLYCOL 3350 17 G/17G
17 POWDER, FOR SOLUTION ORAL DAILY PRN
Status: DISCONTINUED | OUTPATIENT
Start: 2022-06-06 | End: 2022-06-11 | Stop reason: HOSPADM

## 2022-06-06 RX ORDER — BUSPIRONE HYDROCHLORIDE 15 MG/1
15 TABLET ORAL 2 TIMES DAILY
Status: DISCONTINUED | OUTPATIENT
Start: 2022-06-06 | End: 2022-06-11 | Stop reason: HOSPADM

## 2022-06-06 RX ORDER — ATORVASTATIN CALCIUM 40 MG/1
40 TABLET, FILM COATED ORAL NIGHTLY
Status: DISCONTINUED | OUTPATIENT
Start: 2022-06-07 | End: 2022-06-11 | Stop reason: HOSPADM

## 2022-06-06 RX ORDER — LEVOTHYROXINE SODIUM 88 UG/1
88 TABLET ORAL DAILY
Status: DISCONTINUED | OUTPATIENT
Start: 2022-06-07 | End: 2022-06-11 | Stop reason: HOSPADM

## 2022-06-06 RX ORDER — ASPIRIN 81 MG/1
81 TABLET, CHEWABLE ORAL DAILY
Status: DISCONTINUED | OUTPATIENT
Start: 2022-06-07 | End: 2022-06-11 | Stop reason: HOSPADM

## 2022-06-06 RX ORDER — HEPARIN SODIUM 5000 [USP'U]/ML
5000 INJECTION, SOLUTION INTRAVENOUS; SUBCUTANEOUS EVERY 8 HOURS SCHEDULED
Status: DISCONTINUED | OUTPATIENT
Start: 2022-06-06 | End: 2022-06-10

## 2022-06-06 RX ORDER — DOCUSATE SODIUM 100 MG/1
100 CAPSULE, LIQUID FILLED ORAL DAILY
Status: DISCONTINUED | OUTPATIENT
Start: 2022-06-06 | End: 2022-06-11 | Stop reason: HOSPADM

## 2022-06-06 RX ORDER — ACETAMINOPHEN 325 MG/1
650 TABLET ORAL EVERY 4 HOURS PRN
Status: DISCONTINUED | OUTPATIENT
Start: 2022-06-06 | End: 2022-06-11 | Stop reason: HOSPADM

## 2022-06-06 RX ORDER — LORAZEPAM 1 MG/1
1 TABLET ORAL EVERY 6 HOURS PRN
Status: DISCONTINUED | OUTPATIENT
Start: 2022-06-06 | End: 2022-06-11 | Stop reason: HOSPADM

## 2022-06-06 RX ORDER — CLOPIDOGREL BISULFATE 75 MG/1
75 TABLET ORAL DAILY
Status: DISCONTINUED | OUTPATIENT
Start: 2022-06-07 | End: 2022-06-11 | Stop reason: HOSPADM

## 2022-06-06 RX ORDER — ATORVASTATIN CALCIUM 40 MG/1
40 TABLET, FILM COATED ORAL DAILY
Status: DISCONTINUED | OUTPATIENT
Start: 2022-06-07 | End: 2022-06-06

## 2022-06-06 RX ORDER — METOPROLOL TARTRATE 50 MG/1
50 TABLET, FILM COATED ORAL 2 TIMES DAILY
Status: DISCONTINUED | OUTPATIENT
Start: 2022-06-06 | End: 2022-06-10

## 2022-06-06 RX ADMIN — SODIUM CHLORIDE, PRESERVATIVE FREE 10 ML: 5 INJECTION INTRAVENOUS at 09:01

## 2022-06-06 RX ADMIN — LEVOTHYROXINE SODIUM 88 MCG: 0.09 TABLET ORAL at 06:08

## 2022-06-06 RX ADMIN — MICONAZOLE NITRATE: 2 POWDER TOPICAL at 09:00

## 2022-06-06 RX ADMIN — CLOPIDOGREL BISULFATE 75 MG: 75 TABLET ORAL at 08:54

## 2022-06-06 RX ADMIN — HEPARIN SODIUM 5000 UNITS: 5000 INJECTION INTRAVENOUS; SUBCUTANEOUS at 14:10

## 2022-06-06 RX ADMIN — METOPROLOL TARTRATE 50 MG: 50 TABLET, FILM COATED ORAL at 08:55

## 2022-06-06 RX ADMIN — SENNOSIDES 8.6 MG: 8.6 TABLET, FILM COATED ORAL at 20:40

## 2022-06-06 RX ADMIN — METOPROLOL TARTRATE 50 MG: 50 TABLET, FILM COATED ORAL at 20:40

## 2022-06-06 RX ADMIN — HEPARIN SODIUM 5000 UNITS: 5000 INJECTION INTRAVENOUS; SUBCUTANEOUS at 21:47

## 2022-06-06 RX ADMIN — DOCUSATE SODIUM 100 MG: 100 CAPSULE, LIQUID FILLED ORAL at 20:44

## 2022-06-06 RX ADMIN — HEPARIN SODIUM 5000 UNITS: 5000 INJECTION INTRAVENOUS; SUBCUTANEOUS at 09:04

## 2022-06-06 RX ADMIN — HYDROCODONE BITARTRATE AND ACETAMINOPHEN 1 TABLET: 5; 325 TABLET ORAL at 03:32

## 2022-06-06 RX ADMIN — AMLODIPINE BESYLATE 10 MG: 10 TABLET ORAL at 08:54

## 2022-06-06 RX ADMIN — BUSPIRONE HYDROCHLORIDE 15 MG: 15 TABLET ORAL at 08:55

## 2022-06-06 RX ADMIN — LORAZEPAM 1 MG: 1 TABLET ORAL at 21:53

## 2022-06-06 RX ADMIN — BUSPIRONE HYDROCHLORIDE 15 MG: 15 TABLET ORAL at 20:40

## 2022-06-06 RX ADMIN — ATORVASTATIN CALCIUM 40 MG: 40 TABLET, FILM COATED ORAL at 08:55

## 2022-06-06 RX ADMIN — SERTRALINE HYDROCHLORIDE 200 MG: 50 TABLET ORAL at 08:55

## 2022-06-06 RX ADMIN — LORAZEPAM 1 MG: 1 TABLET ORAL at 14:10

## 2022-06-06 RX ADMIN — ASPIRIN 81 MG 81 MG: 81 TABLET ORAL at 08:54

## 2022-06-06 ASSESSMENT — PAIN DESCRIPTION - LOCATION: LOCATION: GENERALIZED

## 2022-06-06 ASSESSMENT — LIFESTYLE VARIABLES: HOW OFTEN DO YOU HAVE A DRINK CONTAINING ALCOHOL: NEVER

## 2022-06-06 ASSESSMENT — PAIN DESCRIPTION - DESCRIPTORS: DESCRIPTORS: ACHING

## 2022-06-06 ASSESSMENT — PAIN SCALES - GENERAL: PAINLEVEL_OUTOF10: 0

## 2022-06-06 NOTE — PROGRESS NOTES
4 Eyes Skin Assessment     NAME:  Dorina Key  YOB: 1945  MEDICAL RECORD NUMBER:  [id number]    The patient is being assess for  Admission    I agree that 2 RN's have performed a thorough Head to Toe Skin Assessment on the patient. ALL assessment sites listed below have been assessed. Areas assessed by both nurses:    Head, Face, Ears, Shoulders, Back, Chest, Arms, Elbows, Hands, Sacrum. Buttock, Coccyx, Ischium and Legs. Feet and Heels        Does the Patient have a Wound? Yes wound(s) were present on assessment.  LDA wound assessment was Initiated and completed        Savage Prevention initiated:  Yes   Wound Care Orders initiated:  Yes    Pressure Injury (Stage 3,4, Unstageable, DTI, NWPT, and Complex wounds) if present place consult order under [de-identified] NA    New and Established Ostomies if present place consult order under : NA      Nurse 1 eSignature: Electronically signed by Svetlana Carey RN on 7/3/35 at 6:30 PM EDT    **SHARE this note so that the co-signing nurse is able to place an eSignature**    Nurse 2 eSignature: Electronically signed by Jesse Newby RN on 6/6/22 at 6:32 PM EDT

## 2022-06-06 NOTE — PROGRESS NOTES
Nephrology Progress Note  6/6/2022 2:37 PM        Subjective:   Admit Date: 5/31/2022  PCP: Guido Duncan MD    Interval History:  patient seen in early morning, this is a late entry    Diet:  she reported eating some    ROS:   she was alert awake and oriented, this morning she knew my name, I had a long conversation with him   urine output recorded 2.4 L for the last 24 hours   no fever and acceptable blood pressure    Data:     Current meds:    aspirin  81 mg Oral Daily    clopidogrel  75 mg Oral Daily    nicotine  1 patch TransDERmal Daily    sodium chloride flush  5-40 mL IntraVENous 2 times per day    heparin (porcine)  5,000 Units SubCUTAneous TID    amLODIPine  10 mg Oral Daily    atorvastatin  40 mg Oral Daily    busPIRone  15 mg Oral BID    levothyroxine  88 mcg Oral Daily    metoprolol tartrate  50 mg Oral BID    sertraline  200 mg Oral Daily    miconazole   Topical BID      sodium chloride      dextrose 100 mL/hr (06/05/22 1242)         I/O last 3 completed shifts: In: 240 [P.O.:240]  Out: 3150 [Urine:3150]    CBC: No results for input(s): WBC, HGB, PLT in the last 72 hours.        Recent Labs     06/04/22  0706   *   K 4.2      CO2 16*   BUN 46*   CREATININE 1.6*   GLUCOSE 124*       Lab Results   Component Value Date    CALCIUM 8.1 (L) 06/04/2022    PHOS 2.6 06/04/2022       Objective:     Vitals: BP (!) 143/79   Pulse 64   Temp 97.6 °F (36.4 °C) (Oral)   Resp 18   Ht 5' 5\" (1.651 m)   Wt 198 lb 9.6 oz (90.1 kg)   SpO2 97%   BMI 33.05 kg/m² ,    General appearance:   alert, awake and oriented  HEENT:   no conjunctival pallor, she is a dentulous  Neck:   seemed supple  Lungs:   few rhonchi no crackles on auscultation  Heart:   seems regular rate and rhythm  Abdomen:  soft, nontender  Extremities:   no edema      Problem List :         Impression :     1.  acute kidney injury- with underlying CKD stage IIIb/IV A1- was recovering, no labs since June 4. likely from toxic and ischemic tubular injury  2.  metabolic acidosis, thought to be from combination of starvation ketosis based on high beta hydroxybutyrate level and normal glucose level, and perhaps acute kidney injury- expected to improve  3.   presumed CVA- based on the MRI of brain interpretation- symptom is doing much better, her mentation has improved- no significant intracranial or extracranial arterial obstruction  4.  underlying diabetes and hypertension    Recommendation/Plan  :     1.  secondary risk reduction would be the goal  2.  she needs excellent blood pressure, lipid and blood sugar control- with appropriate cardiorenal protective medication- mainly renin-angiotensin aldosterone blocker, SGLT2 inhibitor, and if necessary GLP-1 receptor agonist. will can be done sequentially as an outpatient  3.  discontinue D5 water  4.  lab if possible  5.  follow clinically      Suha Lockwood MD MD

## 2022-06-06 NOTE — PROGRESS NOTES
Hospitalist Progress Note      Name:  Sybil Mathews /Age/Sex: 1945  (68 y.o. female)   MRN & CSN:  [id number] & [id number] Admission Date/Time: 2022  6:54 PM   Location:  [address] [place] [address] PCP: Dex Long MD         Hospital Day: 7      Assessment and Plan:     Patient is a 60-year-old female past medical history of hypertension, CKD, diabetes, hyperlipidemia, depression, was admitted for dizziness, acute kidney injury and elevated troponins. Patient kidney function is improving. Patient remains now medically cleared for discharge awaiting placement inpatient rehab. #.  MRI evidence of acute/subacute stroke. MRI obtained yesterday reviewed and shows acute/subacute stroke  A1c, lipid panel ordered  Continue neurochecks per stroke protocol  MRA head and neck reviewed  Continue statin  Continue aspirin and Plavix for 21 days followed by aspirin monotherapy. Cardiology reconsulted for event monitor at [facility]. #.  RICH on CKD  Creatinine continues to improve. Nephrotoxic medications and continue to monitor renal functions close  Nephrology following     #.  Mild hyperkalemia     #. Detectable troponin  -Patient denying any chest pain, shortness of breath, no history of CAD  -Could be secondary to worsening renal function  -Serial troponins, repeat EKG  -2D echo: EF 55% with no regional wall abnormality,  Stress test read did not show any evidence of reversible chronic ischemia.  -Consult following. #.  Non-anion gap metabolic acidosis could be secondary to worsening renal function  . IV fluids for nephrology     #. Hypoglycemia: Improved  Continue dextrose containing fluids per nephrology  Obtain urine culture, blood cultures        #. Fall  -CT head, CT C-spine-no acute process  -X-ray-left [id number] acute fractures  -PT/OT evaluation.      #.  Dizziness: Improved  -Patient admits to having dizziness since last Thursday, unsteady gait  -CT head-no acute process  -MRI shows stroke. -PT/OT evaluation. #.  Hypertension  -Patient is on metoprolol, lisinopril, Norvasc, doxazosin, spironolactone, chlorthalidone  -Hold lisinopril, spironolactone, chlorthalidone, doxazosin     #. Diabetes mellitus type 2, not on long-term insulin  -Patient is on glimepiride, pioglitazone, Farxiga-hold  -Hypoglycemia protocol ordered  -HbA1c-six-point 2-4/2020 2/2022     #. Hyperlipidemia  -Patient is on atorvastatin     #. Hypothyroidism-continue levothyroxine     #. Depression/anxiety/agoraphobia  -Patient is on buspirone, lorazepam, sertraline     #. CKD stage IV     #. Chronic tobacco dependence     DVT prophylaxis: Heparin  CODE STATUS: Total support      Subjective. :     Was seen and examined today. No acute events overnight. Patient with afebrile with stable vital signs. Objective:   Vitals:   Vitals:    06/06/22 0748   BP: (!) 143/79   Pulse: 64   Resp: 18   Temp: 97.6 °F (36.4 °C)   SpO2: 97%         Physical Exam:   GEN: Awake, alert, in no apparent distress awake female, sitting upright in bed in no apparent distress. Appears given age. HEENT: Atraumatic, normocephalic, PERRLA, EOMI  NECK: Supple, no apparent thyromegaly or masses. RESP: Clear lungs to auscultation  CARDIO/VASC: Regular rate and rhythm, normal S1, S2, no murmurs  GI: Abdomen is soft, nontender, no significant organomegaly noted, bowel sounds present  MSK: No gross joint deformities.   Skin: No significant rashes, skin lesions noted  Neuro: AOx3, cranial nerves grossly intact, no focal motor or sensory deficits   PSYCH: Affect appropriate    Medications:   Medications:    aspirin  81 mg Oral Daily    clopidogrel  75 mg Oral Daily    nicotine  1 patch TransDERmal Daily    sodium chloride flush  5-40 mL IntraVENous 2 times per day    heparin (porcine)  5,000 Units SubCUTAneous TID    amLODIPine  10 mg Oral Daily    atorvastatin  40 mg Oral Daily    busPIRone  15 mg Oral BID    levothyroxine  88 mcg Oral Daily  metoprolol tartrate  50 mg Oral BID    sertraline  200 mg Oral Daily    miconazole   Topical BID      Infusions:    sodium chloride      dextrose 100 mL/hr (06/05/22 1242)     PRN Meds: HYDROcodone-acetaminophen, 1 tablet, Q8H PRN  sodium chloride flush, 5-40 mL, PRN  sodium chloride, , PRN  ondansetron, 4 mg, Q8H PRN   Or  ondansetron, 4 mg, Q6H PRN  polyethylene glycol, 17 g, Daily PRN  acetaminophen, 650 mg, Q6H PRN   Or  acetaminophen, 650 mg, Q6H PRN  glucose, 4 tablet, PRN  dextrose bolus, 125 mL, PRN   Or  dextrose bolus, 250 mL, PRN  glucagon (rDNA), 1 mg, PRN  dextrose, 100 mL/hr, PRN  LORazepam, 1 mg, Q8H PRN          Electronically signed by Aruna Reyes MD on 6/6/2022 at 12:01 PM

## 2022-06-06 NOTE — PROGRESS NOTES
Hospitalist Progress Note      Name:  Abbey Goodpasture /Age/Sex: 1945  (68 y.o. female)   MRN & CSN:  [id number] & [id number] Admission Date/Time: 2022  6:54 PM   Location:  [address] [place] [address]- PCP: Silvana Ken MD         Hospital Day: 7      Assessment and Plan:     Patient is a 72-year-old female past medical history of hypertension, CKD, diabetes, hyperlipidemia, depression, was admitted for dizziness, acute kidney injury and elevated troponins. Patient kidney function is improving. Patient now medically stable for discharge awaiting placement in rehab. .      #.  MRI evidence of acute/subacute stroke. MRI obtained yesterday reviewed and shows acute/subacute stroke  A1c, lipid panel ordered  Continue neurochecks per stroke protocol  MRA head and neck pending  Continue statin  Continue aspirin and Plavix  Neurology following  Will need event monitor at discharge per neurology. #.  RICH on CKD  Creatinine continues to improve. Nephrotoxic medications and continue to monitor renal functions close  Nephrology following     #.  Mild hyperkalemia     #. Detectable troponin  -Patient denying any chest pain, shortness of breath, no history of CAD  -Could be secondary to worsening renal function  -Serial troponins, repeat EKG  -2D echo: EF 55% with no regional wall abnormality,  Stress test read did not show any evidence of reversible chronic ischemia.  -Consult following. #.  Non-anion gap metabolic acidosis could be secondary to worsening renal function  . IV fluids for nephrology     #. Hypoglycemia: Improved  Continue dextrose containing fluids per nephrology  Obtain urine culture, blood cultures        #. Fall  -CT head, CT C-spine-no acute process  -X-ray-left [id number] acute fractures  -PT/OT evaluation. #.  Dizziness: Improved  -Patient admits to having dizziness since last Thursday, unsteady gait  -CT head-no acute process  -MRI shows stroke. -PT/OT evaluation.      #. BID    sertraline  200 mg Oral Daily    miconazole   Topical BID      Infusions:    sodium chloride      dextrose 100 mL/hr (06/05/22 1242)     PRN Meds: HYDROcodone-acetaminophen, 1 tablet, Q8H PRN  sodium chloride flush, 5-40 mL, PRN  sodium chloride, , PRN  ondansetron, 4 mg, Q8H PRN   Or  ondansetron, 4 mg, Q6H PRN  polyethylene glycol, 17 g, Daily PRN  acetaminophen, 650 mg, Q6H PRN   Or  acetaminophen, 650 mg, Q6H PRN  glucose, 4 tablet, PRN  dextrose bolus, 125 mL, PRN   Or  dextrose bolus, 250 mL, PRN  glucagon (rDNA), 1 mg, PRN  dextrose, 100 mL/hr, PRN  LORazepam, 1 mg, Q8H PRN          Electronically signed by Fuentes Farfan MD on 6/6/2022 at 12:03 PM

## 2022-06-06 NOTE — PROGRESS NOTES
Report from Louie Plaza. All questions answered at this time. Advised we are ready for the patient in room 1006 at this time.

## 2022-06-06 NOTE — H&P
Veronica Reveal    : 1945  Acct #: [de-identified]  MRN: [id number]              History and physical      Admitting diagnosis: Acute CVA ([address])    Comorbid diagnoses impacting rehabilitation: Generalized weakness, gait disturbance, essential hypertension, acute kidney injury, hyperkalemia, uncontrolled diabetes type 2 with peripheral neuropathy, depression with anxiety, uncontrolled pain (left lower limb), mixed hyperlipidemia, nicotine addiction    Chief complaint: Clumsiness of her limbs. History of present illness: Patient is a 59-year-old right-hand-dominant female with a history of nicotine addiction, depression with anxiety, hypertension diabetes who was found down by her daughter at home in the morning of 2022. Emergency services responded. She is found to be extremely hypoglycemic (blood sugar 33). She responded to glucose administration but declined transport to the hospital.  Later that afternoon she fell in the bathroom. She had altered alertness at that point in the emergency services brought her to our ED. She was still hypoglycemic but had no swelling, hemorrhage or mass identified by brain imaging. Her follow-up MRI showed left cerebellar and left parietal infarctions. She has had bilateral upper and lower limb weakness, fluctuating blood sugars and hypotension. Neurology consultation led to DAPT x21 days with anticipation of withdrawing the Plavix then. She has been unable to do her own toileting, transfers and self-care and has been quite anxious requiring as needed benzodiazepines. She is too unsafe to return directly home and requires inpatient rehabilitation at this time. Review of systems: Poor sleep, anxieties are under control and poor appetite. Urges to smoke. .  The remainder of their review of systems was negative except as mentioned in the history of present illness.      Social History: Lives With: Son,Daughter  Type of Home: House  Home Layout: Two level  Home Access: Stairs to enter with rails  Entrance Stairs - Number of Steps: 1  Entrance Stairs - Rails: None  Bathroom Shower/Tub: Tub/Shower unit  Bathroom Toilet: Standard  Bathroom Equipment: Shower chair  Has the patient had two or more falls in the past year or any fall with injury in the past year?: Yes  Receives Help From: Family  ADL Assistance: Independent  Homemaking Assistance: Needs assistance  Homemaking Responsibilities: No  Ambulation Assistance: Independent  Transfer Assistance: Independent  Active : Yes  Mode of Transportation: Car  Occupation: Retired  Leisure & Hobbies: scratch lottery, coloring  IADL Comments: Receives help from son: Dieter    She reports that she has been smoking cigarettes. She started smoking about 60 years ago. She has a 59.00 pack-year smoking history. She has never used smokeless tobacco. She reports that she does not drink alcohol. Prior (baseline) level of function: Independent with mobility and self-care. Current level of function: 25% physical assistance needed for mobility and self-care. Allergies:  Seasonal    Past Medical History:   Past Medical History:   Diagnosis Date    Acute kidney failure ([facility] [address] 2/16/2018    Chronic kidney disease     Hypertension     Hypothyroidism     Type 2 diabetes mellitus without complication ([facility] [address]         Past Surgical History:     No past surgical history on file.     Current Medications:     Current Facility-Administered Medications:     [START ON 6/7/2022] amLODIPine (NORVASC) tablet 10 mg, 10 mg, Oral, Daily, MD Abhi Hurt  [START ON 6/7/2022] aspirin chewable tablet 81 mg, 81 mg, Oral, Daily, Allen Manzano MD    busPIRone (BUSPAR) tablet 15 mg, 15 mg, Oral, BID, MD Abhi Hurt  [START ON 6/7/2022] clopidogrel (PLAVIX) tablet 75 mg, 75 mg, Oral, Daily, MD Abhi Hurt  [START ON 6/7/2022] levothyroxine (SYNTHROID) tablet 88 mcg, 88 mcg, Oral, Daily, Allen Manzano MD    metoprolol tartrate (LOPRESSOR) tablet 50 mg, 50 mg, Oral, BID, MD Heber Buchanan  [START ON 6/7/2022] sertraline (ZOLOFT) tablet 200 mg, 200 mg, Oral, Daily, MD Heber Buchanan  [START ON 6/7/2022] atorvastatin (LIPITOR) tablet 40 mg, 40 mg, Oral, Nightly, JAMES Butt MD    nicotine (NICODERM CQ) 21 MG/24HR 1 patch, 1 patch, TransDERmal, Daily, JAMES Butt MD    heparin (porcine) injection 5,000 Units, 5,000 Units, SubCUTAneous, 3 times per day, JAMES Butt MD    LORazepam (ATIVAN) tablet 1 mg, 1 mg, Oral, Q6H PRN, JAMES Butt MD    HYDROcodone-acetaminophen (NORCO) 5-325 MG per tablet 1 tablet, 1 tablet, Oral, Q6H PRN, JAMES Butt MD    ondansetron (ZOFRAN-ODT) disintegrating tablet 4 mg, 4 mg, Oral, 4x Daily PRN, JAMES Butt MD    docusate sodium (COLACE) capsule 100 mg, 100 mg, Oral, Daily, JAMES Butt MD    [facility]) tablet 8.6 mg, 1 tablet, Oral, Nightly, JAMES Butt MD    acetaminophen (TYLENOL) tablet 650 mg, 650 mg, Oral, Q4H PRN, JAMES Butt MD    polyethylene glycol [facility]) packet 17 g, 17 g, Oral, Daily PRN, JAMES Butt MD    Family History:   Family History   Problem Relation Age of Onset    Diabetes Father     High Blood Pressure Father     Kidney Disease Father     Cancer Brother         unknown origin    Stroke Maternal Grandmother     Cancer Paternal [de-identified]     Other Mother         hysterectomy    No Known Problems Brother        Exam:    Blood pressure 135/70, pulse 57, temperature 97.9 °F (36.6 °C), temperature source Oral, resp. rate 18, height 5' 5\" (1.651 m), weight 190 lb 11.2 oz (86.5 kg), SpO2 96 %, not currently breastfeeding. General: Patient was seen semirecumbent in bed. Talkative and occasionally tearful. Poor insight and reasoning. Some word finding difficulties. HEENT: No signs of trauma to her head or neck. Guarded cervical rotation beyond 60 degrees. MMM. Speech is intelligible.   No visual field loss.    Pulmonary: Shallow respirations without wheezes or rales. Cardiac: Regular rate and rhythm. Abdomen: Patient's abdomen was soft and nondistended. Bowel sounds were present throughout. There was no rebound, guarding or masses noted. Spinal exam: No percussion tenderness or malalignment. Skin intact. Upper extremities: Clumsy movements across the elbows and wrist.  Fair  strength bilaterally. Full coordination. No swelling or bruising. Lower extremities: Clumsy movements bilaterally. Widened base of support with weightbearing. Give way with MMT. No signs of DVT. Heels clear. Sitting balance was fair. Standing balance was poor. Lab Results   Component Value Date    WBC 7.5 06/02/2022    HGB 15.2 06/02/2022    HCT 47.6 (H) 06/02/2022    .9 (H) 06/02/2022     06/02/2022     No results found for: INR, PROTIME  Lab Results   Component Value Date    CREATININE 1.6 (H) 06/04/2022    BUN 46 (H) 06/04/2022     (L) 06/04/2022    K 4.2 06/04/2022     06/04/2022    CO2 16 (L) 06/04/2022     Lab Results   Component Value Date    ALT 21 06/04/2022    AST 26 06/04/2022    ALKPHOS 81 06/04/2022    BILITOT 0.3 06/04/2022         Impression: 51-year-old female with a history of diabetes, depression with anxiety, hypertension and nicotine addiction. She has suffered cerebellar and cerebral infarctions and has generalized weakness. Strengths for the patient: Reasonably accessible home. Supportive family. Limitations/barriers for the patient: Poor insight and reasoning, her multiple medical comorbidities and her anxieties. Recommendation: Acute inpatient rehabilitation with occupational and physical therapy 180 minutes 5 out of every 7 days. Will address basic and  advancing mobility with self-care instruction and adaptive equipment training. Caregiver education will be offered.  Expected length of stay  prior to a supervised level of function for discharge home with a walker and [facility] OT/PT is 12 to 14 days. Additional recommendation:    1. Acute CVA with generalized weakness: Patient requires daily occupational and physical therapy with speech-language pathology. She needs adaptive equipment training, aggressive pulmonary hygiene measures and DVT prophylaxis. We must provide nutritional support while maximizing blood sugar and blood pressure treatment. Caregiver education will be planned. Antiplatelet therapy with Plavix and aspirin for 21 days then withdrawing the Plavix. Statin therapy. 2. DVT prophylaxis: Heparin 5000 units every 8 hours. I must periodically monitor her hemoglobin and platelet count while on this medication. Weightbearing activities will be pursued daily. GI prophylaxis is available. 3. Uncontrolled diabetes type 2 with hypoglycemia: Because of poor oral intake she will be on a regular diet. We will monitor blood sugars twice daily. Encouraging consistent oral intake. 4. Acute kidney injury: Avoiding nephrotoxic medications. Encouraging consistent oral hydration. Periodic monitoring of her chemistries. 5. Depression with anxiety: Sleep regulation, cautious pain management and BuSpar 15 mg twice daily. Lorazepam as needed. Encouraging family involvement with caregiver training. 6. Nicotine addiction: NicoDerm. 7. Hypertension: Lopressor and Norvasc for blood pressure regulation. Target systolic blood pressures 582-648. Vital signs are checked at rest and with activity. I personally performed a history and physical on this patient within 24 hours of admission to the rehab unit. I have reviewed the preadmission screening and concur with its findings without change. A detailed plan of care will be established by hospital day 4 and I attest the patient is appropriate for inpatient rehabilitation at this time.   I have compared the patient's current functional status noted during my history and physical with that of the preadmission screen and I have found no significant differences.

## 2022-06-06 NOTE — CARE COORDINATION
Per my coworker Lili Fuentes she met with patient today and discussed admitting to ARU. Patient states she is on board with admitting to ARU prior to discharging home with her son Gladys Newton. Patient gave Lili Fuentes permission to discuss admission to ARU. Per Lili Fuentes she had to leave a . Patient meets criteria and is approved to come to ARU. Patient able to admit once medically stable and after ARU Medical Director and  sign the pre-admission screen (PAS). COVID negative test noted on 6/3. Patient won't need a new one. Notified MD of bed being available for patient to admit to ARU today.

## 2022-06-06 NOTE — DISCHARGE SUMMARY
Discharge Summary    Name:  Carmine Hoang /Age/Sex: 1945  (68 y.o. female)   MRN & CSN:  [id number] & [id number] Admission Date/Time: 2022  6:54 PM   Attending:  Allen Manzano MD Discharging Physician: Allen Manzano MD     Hospital Course:   Carmine Hoang is a 68 y.o.  female  who presents with Generalized weakness    Hospital Course.        Patient is a 49-year-old female past medical history of hypertension, CKD, diabetes, hyperlipidemia, depression, was admitted for dizziness, acute kidney injury and elevated troponins.    Patient MRI showed acute stroke. Patient underwent ischemic stroke work-up including MRA. Patient now being discharged to inpatient rehab. Discussed with cardiology. They will see patient in inpatient rehab for event monitor.        #.  MRI evidence of acute/subacute stroke. MRI brain: Acute/subacute stroke  MRA head and neck reviewed. Continue statin DC  Started aspirin and Plavix for 21 days followed by aspirin monotherapy        #.  Dodie Tirado on CKD improved  Was evaluated by nephrology during hospitalization.        #.  Detectable troponin  Echocardiogram reviewed which showed EF of 50% with no regional wall abnormalities  Stress test not show any evidence of reversible myocardial ischemia.        #.  Fall  -CT head, CT C-spine-no acute process  -X-ray-left [id number] acute fractures  -PT/OT evaluation.        #.  Hypertension: Controlled  Continue current inpatient medication for hypertension at inpatient rehab.        #.  Diabetes mellitus type 2, not on long-term insulin  -Patient is on glimepiride, pioglitazone, Farxiga-hold  -Hypoglycemia protocol ordered  -HbA1c-six-point 2-2020     #. Althia Rushing  -Patient is on atorvastatin     #.  Hypothyroidism-continue levothyroxine     #.  Depression/anxiety/agoraphobia  -Patient is on buspirone, lorazepam, sertraline    The patient expressed appropriate understanding of and agreement with the discharge recommendations, medications, and plan.      Consults this admission:  IP CONSULT TO HOSPITALIST  IP CONSULT TO NEPHROLOGY  IP CONSULT TO CARDIOLOGY  IP CONSULT TO NEUROLOGY  IP CONSULT TO CARDIOLOGY  IP CONSULT TO CARDIOLOGY    Discharge Instruction:   Follow up appointments:   Primary care physician:  within 2 weeks    Diet:  cardiac diet   Activity: activity as tolerated  Disposition: Discharged to:   []Home, [][facility], []SNF, [x]Acute Rehab, []Hospice   Condition on discharge: Stable    Discharge Medications:        Medication List      ASK your doctor about these medications    amLODIPine 10 MG tablet  Commonly known as: NORVASC  Take 1 tablet by mouth daily     atorvastatin 40 MG tablet  Commonly known as: LIPITOR  TAKE ONE TABLET BY MOUTH DAILY     busPIRone 15 MG tablet  Commonly known as: BUSPAR  Take 15 mg by mouth 3 times daily     chlorthalidone 25 MG tablet  Commonly known as: HYGROTON  TAKE ONE TABLET BY MOUTH DAILY     dapagliflozin 5 MG tablet  Commonly known as: Farxiga  Take 1 tablet by mouth every morning     doxazosin 2 MG tablet  Commonly known as: Cardura  Take 1 tablet by mouth daily     glimepiride 4 MG tablet  Commonly known as: AMARYL  Take 1 tablet by mouth in the morning and at bedtime     levothyroxine 88 MCG tablet  Commonly known as: SYNTHROID  Take 1 tablet by mouth Daily     lisinopril 20 MG tablet  Commonly known as: PRINIVIL;ZESTRIL  Take 1 tablet by mouth 2 times daily     LORazepam 1 MG tablet  Commonly known as: ATIVAN     metoprolol tartrate 50 MG tablet  Commonly known as: LOPRESSOR  TAKE ONE TABLET BY MOUTH TWICE A DAY     pioglitazone 30 MG tablet  Commonly known as: ACTOS  Take 1 tablet by mouth daily     sertraline 25 MG tablet  Commonly known as: ZOLOFT     spironolactone 100 MG tablet  Commonly known as: Aldactone  Take 1 tablet by mouth daily            Objective Findings at Discharge:   BP (!) 143/79   Pulse 64   Temp 97.6 °F (36.4 °C) (Oral)   Resp 18   Ht 5' 5\" (1.651 m)   Wt 198 lb 9.6 oz (90.1 kg)   SpO2 97%   BMI 33.05 kg/m²            PHYSICAL EXAM   GEN Awake, Alert, in no apparent distress  HEENT Atraumatic, nomocephalic, PERRLA, EOMI  NECK Supple, no lymphadenopaty  RESP Clear lungs to auscultation bilaterally  CARDIO/VASC Normal S1/S2. RRRR. No mumurs. GI Abdomen is soft without significant tenderness, masses, or guarding. Bowel sounds +  MSK No gross joint deformities. SKIN Normal coloration, warm, dry. NEURO Cranial nerves appear grossly intact, normal speech, no lateralizing weakness. PSYCH Awake, alert, oriented x 3. Affect appropriate.     BMP/CBC  Recent Labs     06/04/22  0706   *   K 4.2      CO2 16*   BUN 46*   CREATININE 1.6*       IMAGING:      Discharge Time of 35minutes    Electronically signed by Shelley Rivera MD on 6/6/2022 at 12:07 PM

## 2022-06-06 NOTE — PLAN OF CARE
ARU Interdisciplinary Plan of Care (IPOC)  [facility], [address]  [phone]  Fax: [phone]        Becky Quintero    : 1945  Acct #: [de-identified]  MRN: [id number]   PHYSICIAN:  Bg Perez MD  Primary Active Problems:   Active Hospital Problems    Diagnosis Date Noted    Acute CVA (cerebrovascular accident) [facility]) [I63.9] 2022     Priority: Medium       Rehabilitation Diagnosis:     Acute CVA (cerebrovascular accident) [facility]) [I63.9]          CARE PLAN     NURSING:  Becky Quintero while on this unit will:      Bowel and Bladder   [x] Be continent of bowel and bladder      [x] Have an adequate number of bowel movements   [] Urinate with no urinary retention >300ml in bladder   [] Bladder Scan: (details)   [] Complete bladder protocol with carreno removal   [] Initiate Bladder Program to toilet every ___ hours   [] Initiate Bowel Program to toilet every ___hours   [] Bladder training    [] Bowel training  Pulmonary   [x] Maintain O2 SATs at 92% or greater  Pain Management   [x] Have pain managed while on ARU        [] Be pain free by discharge    [] Medication Management and Education  Maintenance of Skin Integrity/Wound Management   [x] Have no skin breakdown while on ARU   [x] Have improved skin integrity via wound measurements   [] Have no signs/symptoms of infection via infection protection and monitoring at the          wound site  Fall Prevention   [] Be free from injury during hospitalization via fall prevention measures     [] Disease management and Education  Precautions   [] Weight Bearing Precautions   [x] Swallowing Precautions   [] Monitoring of Risks of Complications   [x] DVT Prophylaxis    [] Fluid/electrolyte/Nutrition Management    [x] Complete education with patient/family with understanding demonstrated for          in-room safety with transfers to bed, toilet, wheelchair, shower as well as bathroom activities and hygiene. [] Adjustment   [] Other:   Nursing interventions may include bowel/bladder training, education for medical assistive devices, medication education, O2 saturation management, energy conservation, stress management techniques, fall prevention, alarms protocol, seating and positioning, skin/wound care, pressure relief instruction,dressing changes,  infection protection, DVT prophylaxis, and/or assistance with in room safety with transfers to bed, toilet, wheelchair, shower as well as bathroom activities and hygiene. Patient/caregiver education for:   [x] Disease/sustained injury/management      [x] Medication Use   [] Surgical intervention   [x] Safety/Precautions   [] Body mechanics and or joint protection   [x] Health maintenance         PHYSICAL THERAPY:  Goals:                  Short Term Goals  Time Frame for Short term goals: 10 tx days:  Short term goal 1: Pt will complete sup<->sit Ind. Short term goal 2: Pt will complete OOB transfers Mod Ind-Ind. Short term goal 3: Pt will ambulate 10 ft and 50 ft with turns over level surface using LRAD Mod Ind-Ind. Short term goal 4: Pt will ambulate at least 10 ft of uneven surface and 150 ft over level surface using LRAD with SBA-Sup. Short term goal 5: Pt will ascend/descend curb step using LRAD and 1 flight of stairs using railings with SBA-Sup. Additional Goals?: Yes  Short Term Goal 6: Pt will ascend/descend 4-5 steps using railings Mod Ind. Short Term Goal 7: Pt will complete object retrieval from the floor in standing with 1UE support Mod Ind. These goals were reviewed with this patient at the time of assessment and Delfina Madden is in agreement. Plan of Care: Pt to be seen 5 days per week for a minimum of 60 minutes for 10 days.                 Current Treatment Recommendations: Strengthening,Balance training,Functional mobility training,Transfer training,Endurance training,Gait training,Stair training,Home exercise program,Safety education & training,Patient/Caregiver education & training,Equipment evaluation, education, & procurement,Therapeutic activities community reintegration,animal assisted therapy, and concurrent/group therapy.     PT IRF-CRISTIN scores and goals for initial assessment:   Bed Mobility:   Sit to Lying  Assistance Needed: Supervision or touching assistance  Comment: CGA without use of bed features  (pt crawls up in bed requiring steadying assist)  CARE Score: 4  Discharge Goal: Independent  Roll Left and Right  Assistance Needed: Independent  Comment: Ind without use of bed features  CARE Score: 6  Discharge Goal: Independent  Lying to Sitting on Side of Bed  Assistance Needed: Supervision or touching assistance  Comment: SBA without use of bed features; midline trunk control intact  CARE Score: 4  Discharge Goal: Independent    Transfers:    Sit to Stand  Assistance Needed: Supervision or touching assistance  Comment: CGA without AD  CARE Score: 4  Discharge Goal: Independent  Chair/Bed-to-Chair Transfer  Assistance Needed: Supervision or touching assistance  Comment: CGA (pt automatically reached out for armrest of chair to turn and sit)  CARE Score: 4  Discharge Goal: Independent     Car Transfer  Assistance Needed: Partial/moderate assistance  Comment: Min A to get in as pt stepped in x 2 trials due to decreased strength of LLE to fully clear over ledge of car; CGA to get up and complete transfer out of car; pt used car as external support to complete transfers  CARE Score: 3  Discharge Goal: Independent    Ambulation:    Walking Ability  Does the Patient Walk?: Yes     Walk 10 Feet  Assistance Needed: Partial/moderate assistance  Comment: Min A without AD  CARE Score: 3     Walk 50 Feet with Two Turns  Assistance Needed: Partial/moderate assistance  Comment: Min A without AD  CARE Score: 3     Walk 150 Feet  Comment: pt was not performing this at baseline, however demonstrates potential to progressing to this mobility task; max tolerance today was 54 ft without AD with Min A (limited by fatigue)  Reason if not Attempted: Not attempted due to medical condition or safety concerns  CARE Score: 88  Discharge Goal: Supervision or touching assistance     Walking 10 Feet on Uneven Surfaces  Assistance Needed: Partial/moderate assistance  Comment: Min A without AD  CARE Score: 3  Discharge Goal: Independent     1 Step (Curb)  Assistance Needed: Partial/moderate assistance  Comment: Min A (required HHA to be able to shift weight on ascent/descent)  CARE Score: 3  Discharge Goal: Supervision or touching assistance     4 Steps  Assistance Needed: Supervision or touching assistance  Comment: CGA using railings on 4\"/6\" step heights with step-through pattern on ascent and step-to pattern on descent  CARE Score: 4  Discharge Goal: Independent     12 Steps  Comment: max tolerance was 5 steps (limited by fatigue and R knee pain)  Reason if not Attempted: Not attempted due to medical condition or safety concerns  CARE Score: 88  Discharge Goal: Supervision or touching assistance    Gait Deviations: []None []Slow logan  [] Increased LIZ  [] Staggers []Deviated Path  [] Decreased step length  [] Decreased step height  []Decreased arm swing  [] Shuffles  [] Decreased head and trunk rotation  []other:        Wheelchair:  w/c Ability: Wheelchair Ability  Uses a Wheelchair and/or Scooter?: No                Balance:        Object: Picking Up Object  Assistance Needed: Supervision or touching assistance  Comment: CGA with L UE support; task completed without use of reacher  CARE Score: 4  Discharge Goal: Independent  OCCUPATIONAL THERAPY:  Goals:             Short Term Goals  Time Frame for Short term goals: STGs=LTGs :  Long Term Goals  Time Frame for Long term goals : 7-10 days or until d/c.   Long Term Goal 1: Pt will complete self-feeding c Mod I.  Long Term Goal 2: Pt will complete grooming and oral care tasks c Mod I.  Long Term Goal 3: Pt will complete total body bathing c AE PRN and Mod I.  Long Term Goal 4: Pt will complete UB dressing c Mod I.  Long Term Goal 5: Pt will complete LB dressing c AE PRN and Mod I. Additional Goals?: Yes  Long Term Goal 6: Pt will doff/don footwear c AE PRN and Mod I.  Long Term Goal 7: Pt will perform toileting c Mod I.  Long Term Goal 8: Pt will perform functional transfers (bed, chair, toilet, shower) c DME PRN and Mod I.  Long Term Goal 9: Pt will perform therex/therax to facilitate an increase in strength/endurance/ax tolerance (c emphasis on dynamic standing balance/tolerance > 8 mins, BUE strength, FMC) c Mod I.  Long Term Goal 10: Pt will complete light home management tasks c Mod I. :    These goals were reviewed with this patient at the time of assessment and Anastasia Munoz is in agreement    Plan of Care:  Pt to be seen 5 days per week for a minimum of 60 minutes for 10 days. Plan  Times per Day: Daily  Current Treatment Recommendations: Strengthening,Balance training,Functional mobility training,Endurance training,Safety education & training,Patient/Caregiver education & training,Equipment evaluation, education, & procurement,Self-Care / ADL,Coordination training,Neuromuscular re-education,Home management training,Cognitive/Perceptual training             OT IRF-CRISTIN scores and goals for initial assessment:    ADLs:    Eating: Eating  Assistance Needed: Setup or clean-up assistance  Comment: Setup assist to open butter and seasoning packets for breakfast.  CARE Score: 5  Discharge Goal: Independent       Oral Hygiene: Oral Hygiene  Assistance Needed: Setup or clean-up assistance  Comment: Setup assist to open toothpaste. CARE Score: 5  Discharge Goal: Independent    UB/LB Bathing: Shower/Bathe Self  Assistance Needed: Supervision or touching assistance  Comment: CGA in stance to wash up at sink; majority seated.   CARE Score: 4  Discharge Goal: Independent    UB Dressing: Upper Body Dressing  Assistance Needed: Supervision or touching assistance  Comment: CGA in stance to pull down pullover tshirt. CARE Score: 4  Discharge Goal: Independent         LB Dressing: Lower Body Dressing  Assistance Needed: Partial/moderate assistance  Comment: Pt required Min A to get L foot threaded into depends. Pt able to doff/don pants with CGA in stance to manage over hips. CARE Score: 3  Discharge Goal: Independent    Donning and [place] Footwear: Putting On/Taking Off Footwear  Assistance Needed: Partial/moderate assistance  Comment: Pt able to doff/don socks, however required assistance getting both feet into shoes. CARE Score: 3  Discharge Goal: Independent      Toiletin [address] needed: Supervision or touching assistance  Comment: Pt CG/SBA in stance to manage clothing; Pt completed perineal hygiene seated after episode of urination with SBA d/t Pt with far L lateral lean. CARE Score: 4  Discharge Goal: Independent      Toilet Transfers: Toilet Transfer  Assistance needed: Supervision or touching assistance  Comment: CGA with 2WW and grab bars and Max verbal cues for safety. CARE Score: 4  Discharge Goal: Independent      SPEECH THERAPY: (If ordered)  Plan of Care and Goals:   LTG       Duration/Frequency of Treatment  Duration of Treatment: No ST recommended                       Short-term Goals  Timeframe for Short-term Goals: No therapy at this time unless PT/OT have concerns in fx. Timeframe for Short-term Goals: No therapy at this time unless PT/OT have concerns in fx. LTG:                           Treatments may include speech/language/communication therapy, cognitive training, animal assisted therapy, group therapy, education, and/or dysphagia therapy based on the above goals. Co-treats where appropriate with PT or OT to facilitate patient goals in functional tasks.         These goals were reviewed with this patient at the time of assessment and Simin Sanabria is in agreement. CASE MANAGEMENT:  Goals:   Assist patient/family with discharge planning, patient/family counseling, assistance in obtaining recommended equipment and other services, and coordination with insurance during ARU stay. Patient Goals:   Return to maximum level of independent function. Nutrition goal: Patient will consume at least 75% at meals during stay     Activities Prior to Admit:   Homemaking Responsibilities: Yes  Active : Yes  Mode of Transportation: Car  Occupation: Retired  Leisure & Hobbies: Pt enjoys scratch lottery and coloring. Housework. Grocery  Pt manages meds via pill bottles. Finances are completed via auto deposit. Intensity of Therapy  Simin Sanabria will be seen a minimum of 3 hours of therapy per day/a minimum of 5 out of 7 days per week. [] In this rare instance due to the nature of this patient's medical involvement, this patient will be seen 15 hours per week (900 minutes within a 7 day period). Treatments may include therapeutic exercises, gait training, neuromuscular re-ed, transfer training, community reintegration, bed mobility, w/c mobility and training, self care, home mgmt, cognitive training, energy conservation,dysphagia tx, speech/language/communication therapy, group therapy, and patient/family education. In addition, dietician/nutritionist may monitor calorie count as well as intake and collaboratively work with SLP on dietary upgrades. Neuropsychology/Psychology may evaluate and provide necessary support. Group therapy as appropriate to facilitate improved endurance, STR, COORD, function, safety, transfers, awareness and insight into deficits, problem solving, memory, and social interaction and engagement.     Medical issues being managed closely and that require 24 hour availability of a physician:   [x] Swallowing Precautions                                     [] Weight bearing precautions   [] Wound Care                             [x] Infection Prevention   [x] DVT Prophylaxis/assessment              [x] Monitoring for complications    [x] Fall Precautions/Prevention                         [x] Fluid/Electrolyte/Nutrition Balance   [x] Voice Protection                           [x] Medication Management   [x] Respiratory                   [x] Pain Mgmt   [x] Bowel/Bladder Fx    Medical Prognosis: [] Good  [x] Fair    [] Guarded   Total expected IRF days 12                                            Physician anticipated functional outcomes:  FWW and HHC OT/PT and supervision. Rehab Goals:   [] Return to premorbid function of_______________________________.    [] Independent   [] Mod I  [x] Supervision  [] CGA   [] Min A   [] Mod A  Level for ambulation []without assistive device  [x] with assistive device        [] Independent   [] Mod I  [x] Supervision [] CGA   [] Min A   [] Mod A  Level for transfers []without assistive device  [x] with assistive device         [] Independent   [] Mod I  [x] Supervision [] CGA   [] Min A   [] Mod A Level with ADL's []without assistive device   [x] with assistive device     ___________________     Level with cognitive skills requiring [] No assist [x] Supervision  [] Active Assist/Cues     [] Maximize level of mobility and ADL's to decrease burden on caregiver    IPOC brief synthesis of Preadmission Screen, Post-Admission Evaluation, and Therapy Evaluations: Acute inpatient rehabilitation with occupational and physical therapy 180 minutes 5 out of every 7 days. Will address basic and  advancing mobility with self-care instruction and adaptive equipment training. Caregiver education will be offered. Expected length of stay  prior to a supervised level of function for discharge home with a walker and C OT/PT is 12 to 14 days.     Additional recommendation:     1.  Acute CVA with generalized weakness: Patient requires daily occupational and physical therapy with speech-language pathology. She needs adaptive equipment training, aggressive pulmonary hygiene measures and DVT prophylaxis. We must provide nutritional support while maximizing blood sugar and blood pressure treatment. Caregiver education will be planned. Antiplatelet therapy with Plavix and aspirin for 21 days then withdrawing the Plavix. Statin therapy. 2. DVT prophylaxis: Heparin 5000 units every 8 hours. I must periodically monitor her hemoglobin and platelet count while on this medication. Weightbearing activities will be pursued daily. GI prophylaxis is available. 3. Uncontrolled diabetes type 2 with hypoglycemia: Because of poor oral intake she will be on a regular diet. We will monitor blood sugars twice daily. Encouraging consistent oral intake. 4. Acute kidney injury: Avoiding nephrotoxic medications. Encouraging consistent oral hydration. Periodic monitoring of her chemistries. 5. Depression with anxiety: Sleep regulation, cautious pain management and BuSpar 15 mg twice daily. Lorazepam as needed. Encouraging family involvement with caregiver training. 6. Nicotine addiction: NicoDerm. 7. Hypertension: Lopressor and Norvasc for blood pressure regulation. Target systolic blood pressures [id number]. Vital signs are checked at rest and with activity.     Anticipated discharge destination:    [] Home Independently   [x] Home with supervision    []SNF     [] Other       This plan has been reviewed with me in a language I understand.  I have had the opportunity to include my input with my therapy team.    ________________________________________________   ______________________  Patient/Significant Other      Date    I have reviewed this initial plan of care and agree with its contents:    Title   Name    Date    Time    Physician: Alvaro Nuñez MD 6/8/2022 7:14 AM    Case Mgmt:  Suad Horn 6/7/2022 [id number]    OT: Ludger Corner, OTD, OTR/L 06/07/22 [id number]    PT: Alonso Garzon, [place]     06/07/2022   12:53    RN: Lizy Iglesias RN 6/6/22    ST: Michael Lancaster.  [place], [place], [address] [place] [address] 6/7/2022  10:32 AM      Dietician: BOB Byers  06/07/2022  13:11     ADMIT DATE:6/6/2022

## 2022-06-07 PROBLEM — F41.8 DEPRESSION WITH ANXIETY: Status: ACTIVE | Noted: 2018-02-16

## 2022-06-07 PROBLEM — E11.649 UNCONTROLLED TYPE 2 DIABETES MELLITUS WITH HYPOGLYCEMIA WITHOUT COMA (HCC): Status: ACTIVE | Noted: 2018-02-16

## 2022-06-07 PROBLEM — N17.0 ACUTE KIDNEY INJURY (AKI) WITH ACUTE TUBULAR NECROSIS (ATN) (HCC): Status: ACTIVE | Noted: 2018-02-16

## 2022-06-07 PROCEDURE — 97535 SELF CARE MNGMENT TRAINING: CPT

## 2022-06-07 PROCEDURE — 6360000002 HC RX W HCPCS: Performed by: PHYSICAL MEDICINE & REHABILITATION

## 2022-06-07 PROCEDURE — 92523 SPEECH SOUND LANG COMPREHEN: CPT

## 2022-06-07 PROCEDURE — 97166 OT EVAL MOD COMPLEX 45 MIN: CPT

## 2022-06-07 PROCEDURE — 6370000000 HC RX 637 (ALT 250 FOR IP): Performed by: PHYSICAL MEDICINE & REHABILITATION

## 2022-06-07 PROCEDURE — 97116 GAIT TRAINING THERAPY: CPT

## 2022-06-07 PROCEDURE — 99232 SBSQ HOSP IP/OBS MODERATE 35: CPT | Performed by: PHYSICAL MEDICINE & REHABILITATION

## 2022-06-07 PROCEDURE — 1280000000 HC REHAB R&B

## 2022-06-07 PROCEDURE — 94150 VITAL CAPACITY TEST: CPT

## 2022-06-07 PROCEDURE — 97163 PT EVAL HIGH COMPLEX 45 MIN: CPT

## 2022-06-07 PROCEDURE — 97530 THERAPEUTIC ACTIVITIES: CPT

## 2022-06-07 PROCEDURE — 94761 N-INVAS EAR/PLS OXIMETRY MLT: CPT

## 2022-06-07 PROCEDURE — 6370000000 HC RX 637 (ALT 250 FOR IP): Performed by: HOSPITALIST

## 2022-06-07 RX ADMIN — DOCUSATE SODIUM 100 MG: 100 CAPSULE, LIQUID FILLED ORAL at 08:00

## 2022-06-07 RX ADMIN — HEPARIN SODIUM 5000 UNITS: 5000 INJECTION INTRAVENOUS; SUBCUTANEOUS at 13:44

## 2022-06-07 RX ADMIN — HEPARIN SODIUM 5000 UNITS: 5000 INJECTION INTRAVENOUS; SUBCUTANEOUS at 06:18

## 2022-06-07 RX ADMIN — METOPROLOL TARTRATE 50 MG: 50 TABLET, FILM COATED ORAL at 08:00

## 2022-06-07 RX ADMIN — HYDROCODONE BITARTRATE AND ACETAMINOPHEN 1 TABLET: 5; 325 TABLET ORAL at 06:22

## 2022-06-07 RX ADMIN — LEVOTHYROXINE SODIUM 88 MCG: 0.09 TABLET ORAL at 06:18

## 2022-06-07 RX ADMIN — BUSPIRONE HYDROCHLORIDE 15 MG: 15 TABLET ORAL at 21:34

## 2022-06-07 RX ADMIN — ATORVASTATIN CALCIUM 40 MG: 40 TABLET, FILM COATED ORAL at 21:34

## 2022-06-07 RX ADMIN — AMLODIPINE BESYLATE 10 MG: 10 TABLET ORAL at 08:00

## 2022-06-07 RX ADMIN — SERTRALINE HYDROCHLORIDE 200 MG: 50 TABLET ORAL at 07:59

## 2022-06-07 RX ADMIN — SENNOSIDES 8.6 MG: 8.6 TABLET, FILM COATED ORAL at 21:34

## 2022-06-07 RX ADMIN — HYDROCODONE BITARTRATE AND ACETAMINOPHEN 1 TABLET: 5; 325 TABLET ORAL at 18:27

## 2022-06-07 RX ADMIN — CLOPIDOGREL BISULFATE 75 MG: 75 TABLET ORAL at 08:00

## 2022-06-07 RX ADMIN — HEPARIN SODIUM 5000 UNITS: 5000 INJECTION INTRAVENOUS; SUBCUTANEOUS at 21:35

## 2022-06-07 RX ADMIN — METOPROLOL TARTRATE 50 MG: 50 TABLET, FILM COATED ORAL at 21:35

## 2022-06-07 RX ADMIN — ASPIRIN 81 MG 81 MG: 81 TABLET ORAL at 08:00

## 2022-06-07 RX ADMIN — LORAZEPAM 1 MG: 1 TABLET ORAL at 21:39

## 2022-06-07 RX ADMIN — BUSPIRONE HYDROCHLORIDE 15 MG: 15 TABLET ORAL at 08:00

## 2022-06-07 ASSESSMENT — PAIN DESCRIPTION - ORIENTATION
ORIENTATION: RIGHT

## 2022-06-07 ASSESSMENT — PAIN DESCRIPTION - FREQUENCY
FREQUENCY: INTERMITTENT
FREQUENCY: CONTINUOUS
FREQUENCY: INTERMITTENT

## 2022-06-07 ASSESSMENT — PAIN DESCRIPTION - DESCRIPTORS
DESCRIPTORS: ACHING;DISCOMFORT

## 2022-06-07 ASSESSMENT — PAIN DESCRIPTION - ONSET
ONSET: GRADUAL
ONSET: ON-GOING
ONSET: GRADUAL

## 2022-06-07 ASSESSMENT — PAIN DESCRIPTION - PAIN TYPE
TYPE: CHRONIC PAIN

## 2022-06-07 ASSESSMENT — PAIN SCALES - GENERAL
PAINLEVEL_OUTOF10: 0
PAINLEVEL_OUTOF10: 5
PAINLEVEL_OUTOF10: 7
PAINLEVEL_OUTOF10: 7

## 2022-06-07 ASSESSMENT — PAIN DESCRIPTION - LOCATION
LOCATION: KNEE;LEG

## 2022-06-07 ASSESSMENT — PAIN - FUNCTIONAL ASSESSMENT
PAIN_FUNCTIONAL_ASSESSMENT: ACTIVITIES ARE NOT PREVENTED

## 2022-06-07 NOTE — PROGRESS NOTES
Physical Therapy    [facility] ARU PHYSICAL THERAPY EVALUATION    Chart Review:  Past Medical History:   Diagnosis Date    Acute kidney failure ([facility] [address] 2/16/2018    Chronic kidney disease     Hypertension     Hypothyroidism     Type 2 diabetes mellitus without complication ([facility] [address]      No past surgical history on file. Fall History: 3 without significant injuries   Social History:  Social/Functional History  Lives With: Son (Son - Gael)  Type of Home: House  Home Layout: Multi-level,Laundry in basement (Pt lives in a tri-level home. Pt's bedroom and bathroom on top level with 7 stairs with no HR.  Pt reports she never has to go to basement level.)  Home Access: Stairs to enter without rails  Entrance Stairs - Number of Steps: 1  Entrance Stairs - Rails: None  Bathroom Shower/Tub: Tub/Shower unit  Bathroom Toilet: Standard  Bathroom Equipment: Shower chair (Possible shower chair, Pt unsure.)  Bathroom Accessibility: Walker accessible  Home Equipment: Cane,Cane, quad  Has the patient had two or more falls in the past year or any fall with injury in the past year?: Yes (Pt reports 3 falls in last year with no significant injuries.)  Receives Help From: Family (Pt lives with son who can assist PRN and has 2 daughters in the area that can assist if needed.)  ADL Assistance: Independent (Pt completing sponge baths at baseline.)  Homemaking Assistance: Needs assistance  Homemaking Responsibilities: Yes  Meal Prep Responsibility: Primary (Pt uses the microwave and will make sandwiches.)  Laundry Responsibility: No (Son and daughters complete.)  Cleaning Responsibility: Primary  Bill Paying/Finance Responsibility: Primary  Shopping Responsibility: Secondary (Shared responsibility with son.)  Health Care Management: Primary  Ambulation Assistance: Independent  Transfer Assistance: Independent  Active : Yes  Mode of Transportation: Car  Education: HS education  Occupation: Retired  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 Housework. Grocery  Pt manages meds via pill bottles. Finances are completed via auto deposit. IADL Comments: Joey Folds help from son: Tiffany Lundberg  Additional Comments: Pt has regular flat bed at home. Pt reports 3 falls in last year with no significant injuries. Pt is R hand dominant. Restrictions:  Restrictions/Precautions  Restrictions/Precautions: General Precautions,Fall Risk (Hx of anxiety disorder and agoraphobia)       Subjective: Pt asleep in bed, easily awakens with auditory cues, reports feeling tired and sleepy but willing to participate in PT. Pt states no recollection of the events that happened on the day of her admission to the hospital.    Pain Level: sore (pain not rated)  Pain Location: R knee     Objective:  Orientation  Overall Orientation Status: Within Normal Limits  Orientation Level: Oriented X4        Vision  Vision Exceptions: Wears glasses for reading  Hearing  Hearing: Ex[facility] to [facility]  Hearing Exceptions: Hard of hearing/hearing concerns,No hearing aid    Sensation:  Sensation  Overall Sensation Status: WFL    Observation:   Observation/Palpation  Observation: Pt in semi-luna's position upon entrance, pleasant and agreeable to therapy. During session, OT observed depressive behaviors with Pt's comments and actions. Body Mechanics: Pt with poor eccentric control for stand to sit transfers requiring Max verbal cues for slowed and controlled descent.     ROM:      AROM RLE (degrees)  RLE AROM: WFL     AROM LLE (degrees)  LLE AROM : WFL                 Strength:    Strength RLE  Strength RLE: WFL  Comment: grossly 3+/5  Strength LLE  Strength LLE: WFL  Comment: grossly 3+/5              Bed Mobility:   Lying to Sitting on Side of Bed  Assistance Needed: Supervision or touching assistance  Comment: SBA without use of bed features; midline trunk control intact  CARE Score: 4  Discharge Goal: Independent  Roll Left and Right  Assistance Needed: Independent  Comment: Ind without use of bed features  CARE Score: 6  Discharge Goal: Independent  Sit to Lying  Assistance Needed: Supervision or touching assistance  Comment: CGA without use of bed features  (pt crawls up in bed requiring steadying assist)  CARE Score: 4  Discharge Goal: Independent    Transfers:    Sit to Stand  Assistance Needed: Supervision or touching assistance  Comment: CGA without AD  CARE Score: 4  Discharge Goal: Independent  Chair/Bed-to-Chair Transfer  Assistance Needed: Supervision or touching assistance  Comment: CGA (pt automatically reached out for armrest of chair to turn and sit)  CARE Score: 4  Discharge Goal: Independent     Car Transfer  Assistance Needed: Partial/moderate assistance  Comment: Min A to get in as pt stepped in x 2 trials due to decreased strength of LLE to fully clear over ledge of car; CGA to get up and complete transfer out of car; pt used car as external support to complete transfers  CARE Score: 3  Discharge Goal: Independent    Ambulation:   Device used PTA: none    Walking Ability  Does the Patient Walk?: Yes     Walk 10 Feet  Assistance Needed: Partial/moderate assistance  Comment: Min A without AD  CARE Score: 3     Walk 50 Feet with Two Turns  Assistance Needed: Partial/moderate assistance  Comment: Min A without AD  CARE Score: 3     Walk 150 Feet  Comment: pt was not performing this at baseline, however demonstrates potential to progressing to this mobility task; max tolerance today was 54 ft without AD with Min A (limited by fatigue)  Reason if not Attempted: Not attempted due to medical condition or safety concerns  CARE Score: 88  Discharge Goal: Supervision or touching assistance     Walking 10 Feet on Uneven Surfaces  Assistance Needed: Partial/moderate assistance  Comment: Min A without AD  CARE Score: 3  Discharge Goal: Independent     1 Step (Curb)  Assistance Needed: Partial/moderate assistance  Comment: Min A (required HHA to be able to shift weight on ascent/descent)  CARE Score: 3  Discharge Goal: Supervision or touching assistance     4 Steps  Assistance Needed: Supervision or touching assistance  Comment: CGA using railings on 4\"/6\" step heights with step-through pattern on ascent and step-to pattern on descent  CARE Score: 4  Discharge Goal: Independent     12 Steps  Comment: max tolerance was 5 steps (limited by fatigue and R knee pain)  Reason if not Attempted: Not attempted due to medical condition or safety concerns  CARE Score: 88  Discharge Goal: Supervision or touching assistance    Gait Deviations: []None [x]Slow logan  [] Increased LIZ  [] Staggers []Deviated Path  [x] Decreased stride length  [x] Decreased step height bilat  []Decreased arm swing  [] Shuffles  [x] Decreased head and trunk rotation  []other:        Wheelchair:  w/c Ability: Wheelchair Ability  Uses a Wheelchair and/or Scooter?: No                Balance:        Object: Picking Up Object  Assistance Needed: Supervision or touching assistance  Comment: CGA with L UE support; task completed without use of reacher  CARE Score: 4  Discharge Goal: Independent               Assessment:   The patient is a 68year old female admitted onto ARU after hospitalization for generalized weakness, recurrent falls, and hypoglycemia. Pt was found on the floor and was unresponsive due to hypoglycemia. Pt was also found to have abnormal labs requiring nephrology and cardiology consult. Stress test was negative. MRI revealed tiny acute to subacute infarcts involving the L cerebellum and tiny acute infarct in the L parietal lobe. Pt reports mild, acute  R knee pain that she believes to be acquired when she became unresponsive at home. Despite the said pain, pt was able to tolerate all standing transfers and ambulation tasks today. Midline trunk control is intact and had active movements on BUE/BLE.  She states BLE weakness compared to baseline but no focal, neurologic weakness observed on BLE today. Pt exhibited decreased safety awareness with her routine as observed during car transfer and transfer back to bed and this can be a barrier if she becomes resistant to safety education and/or modification of transfer technique. AD training will be beneficial to address her strength and balance deficits along with the R knee pain if it persists. It is anticipated that her mood or response to medical care can be variable considering her psych Hx and can be a barrier to her progress if she becomes non-receptive to encouragement and education. Body Structures, Functions, Activity Limitations Requiring Skilled Therapeutic Intervention: Decreased functional mobility ,Decreased strength,Decreased safe awareness,Decreased endurance,Decreased balance,Decreased high-level IADLs,Increased pain,Decreased cognition     Therapy Prognosis: Good  Decision Making: High Complexity  Clinical Presentation: unstable/unpredictable characteristics      Patient education:   ARU schedule, ARU expectations for participation, plan of care  Treatment Initiated:  Functional mobility training, gait training, patient education  Barriers to Improvement: mood considering Hx of anxiety disorder and agoraphobia, despises medical care, may be resistant to change in her routine  Discharge Recommendations:   [facility] PT   Equipment Recommendations:  TBD (has cane)     Goals:  Patient Goals   Patient goals : to go home  Short Term Goals  Time Frame for Short term goals: 10 tx days:  Short term goal 1: Pt will complete sup<->sit Ind. Short term goal 2: Pt will complete OOB transfers Mod Ind-Ind. Short term goal 3: Pt will ambulate 10 ft and 50 ft with turns over level surface using LRAD Mod Ind-Ind. Short term goal 4: Pt will ambulate at least 10 ft of uneven surface and 150 ft over level surface using LRAD with SBA-Sup.   Short term goal 5: Pt will ascend/descend curb step using LRAD and 1 flight of stairs using railings with SBA-Sup. Additional Goals?: Yes  Short Term Goal 6: Pt will ascend/descend 4-5 steps using railings Mod Ind. Short Term Goal 7: Pt will complete object retrieval from the floor in standing with 1UE support Mod Ind.      Plan:    Requires PT Follow-Up: Yes  Pt will be seen at least 60 minutes per day for a minimum of 5 days per week, plus group therapy as appropriate  Plan  Current Treatment Recommendations: Strengthening,Balance training,Functional mobility training,Transfer training,Endurance training,Gait training,Stair training,Home exercise program,Safety education & training,Patient/Caregiver education & training,Equipment evaluation, education, & procurement,Therapeutic activities    PT Individual Minutes  Time In: 1100  Time Out: 2923  Minutes: 65        Timed Code Treatment Minutes: 50 Minutes    Number of Minutes/Billable Intervention      PT Evaluation 15   Gait Training 30   Therapeutic Exercise    Neuro Re-Ed    Therapeutic Activity 20   Wheelchair Propulsion    Group    Other:    TOTAL 65       Electronically signed by:    Gopi Maravilla, PT   6/7/2022, 12:52

## 2022-06-07 NOTE — CARE COORDINATION
Case Management Admission Note      Patient:Mckenna Dias      :1945  XPE:[id number]  Rehab Dx/Hx: Acute CVA (cerebrovascular accident) [facility]) [I63.9]    Chief Complaint:   Past Medical History:   Diagnosis Date    Acute kidney failure ([facility] [address] 2018    Chronic kidney disease     Hypertension     Hypothyroidism     Type 2 diabetes mellitus without complication ([facility] [address]      No past surgical history on file. Allergies   Allergen Reactions    Seasonal      Precautions: falls    Date of Admit: 2022  Room #: 1006/1006-A      Current functional status at time of admit:        Home Living/DME Available:      Type of Home: House  Home Access: Stairs to enter without rails  Bathroom Shower/Tub: Tub/Shower unit  Bathroom Toilet: Standard  Bathroom Equipment: Shower chair (Possible shower chair, Pt unsure.)  Home Equipment: Cane,Cane, quad       IADL Hx:   Homemaking Responsibilities: Yes  Active : Yes  Mode of Transportation: Car  Occupation: Retired  Leisure & Hobbies: Pt enjoys scratch lottery and coloring. Housework. Grocery  Pt manages meds via pill bottles. Finances are completed via auto deposit. Spouse:   Family:  3 local adult children     Comments:  Patient plans dc 2-level home with supportive adult children - two dtrs & a son. Patient has no dc concerns at this time. [address] PTA. Patient hoping for a stay shorter than 11 days.     Julio Mancini, 2022, 1:34 PM

## 2022-06-07 NOTE — PROGRESS NOTES
Occupational Therapy                              [facility] ARU OCCUPATIONAL THERAPY EVALUATION    Chart Review:  Past Medical History:   Diagnosis Date    Acute kidney failure ([facility] [address] 2/16/2018    Chronic kidney disease     Hypertension     Hypothyroidism     Type 2 diabetes mellitus without complication ([facility] [address]      No past surgical history on file. Social History:  Social/Functional History  Lives With: Son (Son Krunal Mayo)  Type of Home: House  Home Layout: Multi-level,Laundry in basement (Pt lives in a tri-level home. Pt's bedroom and bathroom on top level with 7 stairs with no HR. Pt reports she never has to go to basement level.)  Home Access: Stairs to enter without rails  Entrance Stairs - Number of Steps: 1  Entrance Stairs - Rails: None  Bathroom Shower/Tub: Tub/Shower unit  Bathroom Toilet: Standard  Bathroom Equipment: Shower chair (Possible shower chair, Pt unsure.)  Bathroom Accessibility: Walker accessible  Home Equipment: Cane,Cane, quad  Has the patient had two or more falls in the past year or any fall with injury in the past year?: Yes (Pt reports 3 falls in last year with no significant injuries.)  Receives Help From: Family (Pt lives with son who can assist PRN and has 2 daughters in the area that can assist if needed.)  ADL Assistance: Independent (Pt completing sponge baths at baseline.)  Homemaking Assistance: Needs assistance  Homemaking Responsibilities: Yes  Meal Prep Responsibility: Primary (Pt uses the microwave and will make sandwiches.)  Laundry Responsibility: No (Son and daughters complete.)  Cleaning Responsibility: Primary  Bill Paying/Finance Responsibility: Primary  Shopping Responsibility: Secondary (Shared responsibility with son.)  Health Care Management: Primary  Ambulation Assistance: Independent  Transfer Assistance: Independent  Active : Yes  Mode of Transportation: Car  Occupation: Retired  Type of Occupation: Pt worked for [employer].   Leisure & Hobbies: Pt enjoys scratch lottery and coloring. Additional Comments: Pt has regular flat bed at home. Pt reports 3 falls in last year with no significant injuries. Pt is R hand dominant. Restrictions:  Restrictions/Precautions  Restrictions/Precautions: General Precautions,Fall Risk                  Pain Level: 5  Pain Location: Knee,Leg    Objective:  Observation/Palpation  Observation: Pt in semi-luna's position upon entrance, pleasant and agreeable to therapy. During session, OT observed depressive behaviors with Pt's comments and actions. Body Mechanics: Pt with poor eccentric control for stand to sit transfers requiring Max verbal cues for slowed and controlled descent. Vision  Vision Exceptions: Wears glasses for reading (Previous cataract surgeries.)     Hearing  Hearing: Exceptions to [facility]  Hearing Exceptions: Hard of hearing/hearing concerns    ROM:      LUE AROM (degrees)  LUE AROM : WFL     Left Hand AROM (degrees)  Left Hand AROM: WFL     RUE AROM (degrees)  RUE AROM : WFL     Right Hand AROM (degrees)  Right Hand AROM: WFL    Strength:    LUE Strength  L Hand General: 4/5  LUE Strength Comment: 4/5 grossly  RUE Strength  R Hand General: 4/5  RUE Strength Comment: 4/5 grossly    Quality of Movement: Tone RUE  RUE Tone: Normotonic  Tone LUE  LUE Tone: Normotonic  Coordination  Movements Are Fluid And Coordinated: Yes  Coordination and Movement Description: Fine motor impairments       Sensation:    Sensation  Overall Sensation Status: WNL     ADLs:  Eating: Eating  Assistance Needed: Setup or clean-up assistance  Comment: Setup assist to open butter and seasoning packets for breakfast.  CARE Score: 5  Discharge Goal: Independent       Oral Hygiene: Oral Hygiene  Assistance Needed: Setup or clean-up assistance  Comment: Setup assist to open toothpaste.   CARE Score: 5  Discharge Goal: Independent    UB/LB Bathing: Shower/Bathe Self  Assistance Needed: Supervision or touching assistance  Comment: CGA in stance to wash up at sink; majority seated. CARE Score: 4  Discharge Goal: Independent    UB Dressing: Upper Body Dressing  Assistance Needed: Supervision or touching assistance  Comment: CGA in stance to pull down pullover tshirt. CARE Score: 4  Discharge Goal: Independent         LB Dressing:   Lower Body Dressing  Assistance Needed: Partial/moderate assistance  Comment: Pt required Min A to get L foot threaded into depends. Pt able to doff/don pants with CGA in stance to manage over hips. CARE Score: 3  Discharge Goal: Independent    Donning and [place] Footwear: Putting On/Taking Off Footwear  Assistance Needed: Partial/moderate assistance  Comment: Pt able to doff/don socks, however required assistance getting both feet into shoes. CARE Score: 3  Discharge Goal: Independent      To[address] needed: Supervision or touching assistance  Comment: Pt CG/SBA in stance to manage clothing; Pt completed perineal hygiene seated after episode of urination with SBA d/t Pt with far L lateral lean. CARE Score: 4  Discharge Goal: Independent      Bed Mobility:    Bed mobility  Supine to Sit: Minimal assistance    Transfers:    Transfers  Stand Pivot Transfers: Contact guard assistance (CGA with Max verbal cues for safety.)  Sit to stand: Contact guard assistance  Stand to sit: Contact guard assistance           Toilet Transfer  Assistance needed: Supervision or touching assistance  Comment: CGA with 2WW and grab bars and Max verbal cues for safety.   CARE Score: 4  Discharge Goal: Independent    Functional Mobility:    Balance  Sitting Balance: Supervision  Standing Balance: Contact guard assistance  Standing Balance  Time: ~ 2 mins  Activity: ADL  Functional Mobility  Functional - Mobility Device: Rolling Walker  Activity: To/from bathroom  Assist Level: Contact guard assistance     Cognition:  Cognition  Overall Cognitive Status: Exceptions  Arousal/Alertness: Appropriate responses to stimuli  Following Commands: Follows multistep commands with increased time  Attention Span: Attends with cues to redirect  Memory: Decreased short term memory  Safety Judgement: Decreased awareness of need for assistance,Decreased awareness of need for safety  Problem Solving: Decreased awareness of errors,Assistance required to generate solutions,Assistance required to identify errors made,Assistance required to correct errors made,Assistance required to implement solutions  Insights: Decreased awareness of deficits  Initiation: Requires cues for some  Sequencing: Requires cues for some    Perception:  Perception  Overall Perceptual Status: WFL      Assessment:     Performance deficits / Impairments: Decreased functional mobility ,Decreased safe awareness,Decreased balance,Decreased coordination,Decreased ADL status,Decreased cognition,Decreased endurance,Decreased high-level IADLs,Decreased strength,Decreased fine motor control    The patient is a 68year old female admitted onto ARU after hospitalization for tiny acute to subacute infarcts involving the left cerebellum and tiny acute infarct involving the left parietal lobe cortex with generalized weakness. Pt originally presented to the ED on 5/31 d/t generalized weakness recurrent fall hypoglycemia. The day of ER presentation, Pt's daughter found her on the floor unresponsive, profoundly hypoglycemic and squad was called. Pt was given dextrose infusion and became responsive with Pt then refusing to come to ED. Later that day, daughter found Pt crawling up the stair with worsening symptoms, therefore, EMS called again and Pt with MRI with evident infarcts. PTA, Pt living with her son in a tri-level home, with Pt completing all ADLs independently including sponge bathing at baseline. Pt was not using an assistive device for functional mobility at home as Pt stated she did not know how to use her cane.  Pt's son and 2 daughters in the area are available to assist prn. Today, Pt demonstrated depressive behaviors making comments \"I just don't want to do this anymore\", etc. And pausing activities d/t becoming upset. Pt was able to complete all ADLs with CGA with the exception of LB dressing and footwear where Pt required Min A. Pt completed functional mobility/transfers with 2WW and CGA with Max verbal cues for safety. The QI, MMT, and ROM standardized assessments were used this date to determine the above performance deficits, which compromise pt's ability to safely complete ADLs/IADLs/mobility. Pt will benefit from ARU OT services to increase functional performance and return to PLOF. Decision Making: Medium Complexity  Clinical Presentation:  Pt presents to this ARU with deficits including functional balance/transfers/mobility, strength, endurance, ax tolerance, cognition, and ADLs/IADLs. Patient education:   ARU namrata, Role of O.T., O.T. plan of care  []   Patient goal was established and reviewed in Rehabtracker with patient and/or family this date. REQUIRES OT FOLLOW-UP: Yes  Discharge Recommendations:  Home with son and [facility] OT. Equipment Recommendations:  None. Goals:     Short Term Goals  Time Frame for Short term goals: STGs=LTGs  Long Term Goals  Time Frame for Long term goals : 7-10 days or until d/c. Long Term Goal 1: Pt will complete self-feeding c Mod I.  Long Term Goal 2: Pt will complete grooming and oral care tasks c Mod I.  Long Term Goal 3: Pt will complete total body bathing c AE PRN and Mod I.  Long Term Goal 4: Pt will complete UB dressing c Mod I.  Long Term Goal 5: Pt will complete LB dressing c AE PRN and Mod I.   Additional Goals?: Yes  Long Term Goal 6: Pt will doff/don footwear c AE PRN and Mod I.  Long Term Goal 7: Pt will perform toileting c Mod I.  Long Term Goal 8: Pt will perform functional transfers (bed, chair, toilet, shower) c DME PRN and Mod I.  Long Term Goal 9: Pt will perform therex/therax to facilitate an increase in strength/endurance/ax tolerance (c emphasis on dynamic standing balance/tolerance > 8 mins, BUE strength, FMC) c Mod I.  Long Term Goal 10:  Pt will complete light home management tasks c Mod I.    Plan:    Pt will be seen at least 60 minutes per day for a minimum of 5 days per week, plus group therapy as appropriate  Current Treatment Recommendations: Strengthening,Balance training,Functional mobility training,Endurance training,Safety education & training,Patient/Caregiver education & training,Equipment evaluation, education, & procurement,Self-Care / ADL,Coordination training,Neuromuscular re-education,Home management training,Cognitive/Perceptual training    OT Individual Minutes  Time In: 7239  Time Out: 0900  Minutes: 85                Number of Minutes/Billable Intervention      OT Evaluation 25   Therapeutic Exercise    ADL Self-care 60   Neuro Re-Ed    Therapeutic Activity    Group    Other:    TOTAL 85     Electronically signed by:    NORY Ray, OTR/L  6/7/2022, 9:25 AM

## 2022-06-07 NOTE — PROGRESS NOTES
g/kg)  Method Used for Fluid Requirements: 1 ml/kcal  Fluid (ml/day): 1800    Nutrition Diagnosis:   · Predicted inadequate energy intake related to other (comment) (reduced appetite in illness) as evidenced by poor intake prior to admission      Nutrition Interventions:   Food and/or Nutrient Delivery: Continue Current Diet,Continue Oral Nutrition Supplement     Coordination of Nutrition Care: Continue to monitor while inpatient       Goals:     Goals: PO intake 75% or greater,by next RD assessment       Nutrition Monitoring and Evaluation:      Food/Nutrient Intake Outcomes: Diet Advancement/Tolerance,Food and Nutrient Intake,Supplement Intake  Physical Signs/Symptoms Outcomes: Biochemical Data,Meal Time Behavior,Skin,Weight,Nutrition Focused Physical Findings    Discharge Planning:     Too soon to determine     Aminah Rm, RD, LD  Contact: [phone]

## 2022-06-07 NOTE — PROGRESS NOTES
Facility/Department: [address] [place] [address]  Initial Speech/Language/Cognitive Assessment    NAME: Malu Turcios  : 1945   MRN: [id number]  ADMISSION DATE: 2022  ADMITTING DIAGNOSIS: has Stage 4 chronic kidney disease ([facility] [address] Type 2 diabetes mellitus with stage 4 chronic kidney disease, without long-term current use of insulin ([facility] [address] HTN (hypertension); Hyperlipidemia; Hypothyroidism; Anxiety disorder; Tobacco abuse; Chronic depression; RLS (restless legs syndrome); Arthritis of both knees; Generalized weakness; Ischemic stroke ([facility] [address] and Acute CVA (cerebrovascular accident) [facility]) on their problem list.  DATE ONSET: 22    Date of Eval: 2022   Evaluating Therapist: CLARK Son    RECENT RESULTS  CT OF HEAD/MRI: Radiology Findings  1. Patient motion limits evaluation. 2. There are 2 tiny acute to subacute infarcts involving the left cerebellum as well as a tiny acute infarct involving the left parietal lobe cortex. No significant mass effect or midline shift. 3. Otherwise, no acute intracranial abnormality. 4. Chronic lacunar infarcts involving the cerebellar hemispheres bilaterally. 5. Mild global parenchymal volume loss with mild-to-moderate chronic microvascular ischemic changes. Primary Complaint:  I just want to get home. I don't Sarika Potash be here very long. Pain:  Pain Assessment  Pain Assessment: 0-10  Pain Level: 0    Assessment:  Per PAS: Ms Chad Waite is a 49-year-old female, who presented to ED on  via EMS with generalized weakness recurrent fall hypoglycemia. Apparently patient has had symptoms for several days according to family when patient was admitted. The day of her ER presentation daughter found her on the floor unresponsive, she was profoundly hypoglycemic at the time and the squad was called. She was given dextrose infusion and became responsive. Ms Chad Waite refused to come to the emergency department at that time.  Daughter had patient stay on the ground floor at the home d/t her symptoms. Later that day, daughter found her trying to crawl up the stairs. As her symptoms did not improve and rather worsen, daughter decided to call the EMS again. Upon arrival patient was found to have increased creatinine of 3.1, and BUN of 82, with a low serum bicarbonate of 10. Along with potassium of 5.4. Her most recent creatinine was 2.0. Additional abnormal lab include high troponin of 0.325, high proBNP level more than 1000. Also elevated total CK. Nephrology and cardiology was consulted. Stress test was negative, elevated troponin potentially 2/2 worsening renal function. Nephrology following as patients renal function is improving. Per CM note she spoke with family who states patient is mildly confused at baseline, but increased confusion noted prior to admitting and during hospital stay. MRI was obtained and patient was found to have Tiny acute to subacute infarcts involving the left cerebellum and Tiny acute infarct involving the left parietal lobe cortex. Per therapy notes patient presenting with generalized weakness. Also told CM the goal is for patient to return back home with them at d/c from ARU. Patient lives with her children and is IND with ADL and ambulation. Currently patient is min-maxA with ADL's and Myrtle with RW for mobility. Tried reaching out to family number listed on facesheet has been disconnected and patient doesn't know any other numbers. No one was at patients bedside when talking with her like when CM spoke with them earlier. COVID negative test noted on 6/3. Medical/Surgical History   Acute kidney failure ([facility] [address] 2/16/2018  Chronic kidney disease    Hypertension    Hypothyroidism    Type 2 diabetes mellitus without complication ([address]    Pt with  Mild working memory deficits in structure with good compensation. Problem solving verbally intact. Some self limiting behaviors and resistance to call button and alarm but understands rationale. Recommendations:  Requires SLP Intervention: No  Duration of Treatment: No ST recommended          Plan:   Goals:  Short-term Goals  Timeframe for Short-term Goals: No therapy at this time unless PT/OT have concerns in fx. Patient/family involved in developing goals and treatment plan: Pt in agreement    Subjective:   Previous level of function and limitations: Minh Palma was independent with activities of daily living prior to admit. 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 Housework. Grocery  Pt manages meds via pill bottles. Finances are completed via auto deposit. IADL Comments: Eliana Client help from son: Shellie Guidry  Additional Comments: Pt has regular flat bed at home. Pt reports 3 falls in last year with no significant injuries. Pt is R hand dominant.   Vision  Vision Exceptions: Wears glasses for reading  Hearing  Hearing Exceptions: Hard of hearing/hearing concerns           Objective:     Oral/Motor  Oral Hygiene: Moist  WFL    Auditory Comprehension  Comprehension: Within Functional Limits    Reading Comprehension  Reading Status: Within functional limits (Paragraph--intact)    Expression  Primary Mode of Expression: Verbal    Verbal Expression  Verbal Expression: Within functional limits    Written Expression  Dominant Hand: Right  Written Expression: Within Functional Limits         Pragmatics/Social Functioning  Pragmatics: Within functional limits    Cognition:      Orientation  Overall Orientation Status: Within Functional Limits  Attention  Attention: Within Functional Limits  Memory  Memory: Exceptions to [facility]  Working Memory: Mild (Improves with choice cues or category cues in structure.)  Problem Solving  Problem Solving: Within Functional Limits (Pt expresses that she doesn't always want to wait for call button to be answered for toileting needs but understands the need and rationale to use call button.)  Abstract Reasoning  Abstract Reasoning: Within Functional Limits  Safety/Judgment  Safety/Judgment: Within Functional Limits (Some self limiting behaviors and comments.)    Additional Assessments: BIMS  Understanding Verbal and Non-Verbal Content: Understands  Expression of Ideas and Wants: Without difficulty  Cognitive Pattern Assessment Used: BIMS  BIMS Summary Score: 14 out of 15 cognitive score  The Brief Cognitive Assessment Tool (BCAT®) was administered 6/7/2022 to assess the following cognitive domains: orientation, verbal recall, visual recognition, visual recall, attention, abstraction, language, executive functions, and visuo-spatial processing. The emphasis of the tool is assessing contextual memory, executive functions, and attentional capacity.    Cognitive Impairment Scale:  NORMAL:    46-50   NO FUNCTIONAL DEFICIT; INDEPENDENT LIVING; MAY BE  SUBJECTIVE MEMORY COMPLAINTS BUT LITTLE TO NO EVIDENCE  MILD COGNITIVE  IMPAIRMENT; 34-46   GENERALLY FUNCTIONAL WITH EARLY SPECIFIC FX DECLINE (IADL)   LOWER SCORES MORE SUGGESTIVE OF ADDITIONAL SUPPORT NEEDS   BOTTOM RANGE SCORES CONSIDER MED MGMT AND SUPPORT FOR COMMUNITY REINTEGRATION    MILD DEMENTIA 26-34   IADL DEFICITS; CLEARLY OBJECTIVE EVIDENCE OF MEMORY AND OTHER COGNITIVE DECLINE; MED MGMT AND COMMUNITY REINTEGRATION SUPPORT NEEDED    MODERATE TO   SEVERE DEMENTIA 0-25   MODERATE (UPPER END OF RANGE)--PERVASIVE FX DEFICITS (IADLS) BUT ADLS GENERALLY INTACT   MARKED DEFICITS IN MEMORY AND EXECUTIVE FX; BEHAVIORAL AND PSYCH SYMPTOMS ARE COMMON      Dementia Impairment Scale:  Mild Cognitive Impairment  38  Mild Dementia  28  Moderate Dementia  18      BCAT score for Mckenna Selby: 45/50 indicating mild cognitive impairment. Strengths:  Ability to put names to objects  Ability to concentrate and focus  Determine how objects are similar to one another  Understand and express speech  \"Command and control\" cognitive activities  Awareness of self, time, place, and situation  Ability to immediately recall a word list of 4 items: banana/justice/Rosanne/bridge. Pt able to complete immediate recall 4/4 and delayed recall 3/4  Recall previously presented words over a time delay and recall elements of a story and previously presented pictures/story  Story specifics included:  Sally Pea / borrowed / $9 / from her brother / Buzz Donning / last week. / She couldn't pay him back / because she bought /a delicious / ice cream cone / at the circus instead. Pts immediate response was 8/11 and delayed response: 8/11  Strategies that improved performance included:   [x] repetition   [x] practical application    [] spaced retrieval    [x] choice cues  Weaknesses: Understand visual processes and relationships  Delayed recall           Prognosis:  Speech Therapy Prognosis  Prognosis: Good  Prognosis Considerations: Potential;Previous Level of Function;Participation Level  Individuals consulted  Consulted and agree with results and recommendations: Patient    Education:  Patient Education: results and recommendations; rehab expectations  Patient Education Response: Verbalizes understanding  Safety Devices in place: Yes  Type of devices: Left in chair;Call light within reach; Chair alarm in place    Therapy Time:   Individual Concurrent Group Co-treatment   Time In 0930         Time Out 1005         Minutes [address] [place], [place], CCC-SLP  6/7/2022 10:28 AM

## 2022-06-08 PROCEDURE — 1280000000 HC REHAB R&B

## 2022-06-08 PROCEDURE — 6360000002 HC RX W HCPCS: Performed by: PHYSICAL MEDICINE & REHABILITATION

## 2022-06-08 PROCEDURE — 99232 SBSQ HOSP IP/OBS MODERATE 35: CPT | Performed by: PHYSICAL MEDICINE & REHABILITATION

## 2022-06-08 PROCEDURE — 94761 N-INVAS EAR/PLS OXIMETRY MLT: CPT

## 2022-06-08 PROCEDURE — 97530 THERAPEUTIC ACTIVITIES: CPT

## 2022-06-08 PROCEDURE — 97116 GAIT TRAINING THERAPY: CPT

## 2022-06-08 PROCEDURE — 94664 DEMO&/EVAL PT USE INHALER: CPT

## 2022-06-08 PROCEDURE — 94150 VITAL CAPACITY TEST: CPT

## 2022-06-08 PROCEDURE — 97535 SELF CARE MNGMENT TRAINING: CPT

## 2022-06-08 PROCEDURE — 97110 THERAPEUTIC EXERCISES: CPT

## 2022-06-08 PROCEDURE — 6370000000 HC RX 637 (ALT 250 FOR IP): Performed by: HOSPITALIST

## 2022-06-08 PROCEDURE — 6370000000 HC RX 637 (ALT 250 FOR IP): Performed by: PHYSICAL MEDICINE & REHABILITATION

## 2022-06-08 RX ADMIN — HEPARIN SODIUM 5000 UNITS: 5000 INJECTION INTRAVENOUS; SUBCUTANEOUS at 20:59

## 2022-06-08 RX ADMIN — DOCUSATE SODIUM 100 MG: 100 CAPSULE, LIQUID FILLED ORAL at 09:27

## 2022-06-08 RX ADMIN — LEVOTHYROXINE SODIUM 88 MCG: 0.09 TABLET ORAL at 06:32

## 2022-06-08 RX ADMIN — SERTRALINE HYDROCHLORIDE 200 MG: 50 TABLET ORAL at 09:27

## 2022-06-08 RX ADMIN — HEPARIN SODIUM 5000 UNITS: 5000 INJECTION INTRAVENOUS; SUBCUTANEOUS at 12:55

## 2022-06-08 RX ADMIN — METOPROLOL TARTRATE 50 MG: 50 TABLET, FILM COATED ORAL at 09:27

## 2022-06-08 RX ADMIN — BUSPIRONE HYDROCHLORIDE 15 MG: 15 TABLET ORAL at 09:27

## 2022-06-08 RX ADMIN — METOPROLOL TARTRATE 50 MG: 50 TABLET, FILM COATED ORAL at 20:59

## 2022-06-08 RX ADMIN — CLOPIDOGREL BISULFATE 75 MG: 75 TABLET ORAL at 09:29

## 2022-06-08 RX ADMIN — HYDROCODONE BITARTRATE AND ACETAMINOPHEN 1 TABLET: 5; 325 TABLET ORAL at 06:32

## 2022-06-08 RX ADMIN — HEPARIN SODIUM 5000 UNITS: 5000 INJECTION INTRAVENOUS; SUBCUTANEOUS at 06:33

## 2022-06-08 RX ADMIN — BUSPIRONE HYDROCHLORIDE 15 MG: 15 TABLET ORAL at 20:59

## 2022-06-08 RX ADMIN — ATORVASTATIN CALCIUM 40 MG: 40 TABLET, FILM COATED ORAL at 20:59

## 2022-06-08 RX ADMIN — AMLODIPINE BESYLATE 10 MG: 10 TABLET ORAL at 09:27

## 2022-06-08 RX ADMIN — ASPIRIN 81 MG 81 MG: 81 TABLET ORAL at 09:29

## 2022-06-08 ASSESSMENT — PAIN SCALES - GENERAL
PAINLEVEL_OUTOF10: 0
PAINLEVEL_OUTOF10: 7
PAINLEVEL_OUTOF10: 2
PAINLEVEL_OUTOF10: 7

## 2022-06-08 ASSESSMENT — PAIN DESCRIPTION - ORIENTATION
ORIENTATION: RIGHT
ORIENTATION: RIGHT

## 2022-06-08 ASSESSMENT — PAIN DESCRIPTION - ONSET
ONSET: ON-GOING
ONSET: ON-GOING

## 2022-06-08 ASSESSMENT — PAIN DESCRIPTION - FREQUENCY
FREQUENCY: INTERMITTENT
FREQUENCY: INTERMITTENT

## 2022-06-08 ASSESSMENT — PAIN - FUNCTIONAL ASSESSMENT
PAIN_FUNCTIONAL_ASSESSMENT: ACTIVITIES ARE NOT PREVENTED
PAIN_FUNCTIONAL_ASSESSMENT: ACTIVITIES ARE NOT PREVENTED

## 2022-06-08 ASSESSMENT — PAIN DESCRIPTION - PAIN TYPE
TYPE: CHRONIC PAIN
TYPE: CHRONIC PAIN

## 2022-06-08 ASSESSMENT — PAIN DESCRIPTION - LOCATION
LOCATION: KNEE;LEG
LOCATION: KNEE

## 2022-06-08 ASSESSMENT — PAIN DESCRIPTION - DESCRIPTORS
DESCRIPTORS: ACHING;DISCOMFORT
DESCRIPTORS: ACHING;DISCOMFORT

## 2022-06-08 NOTE — FLOWSHEET NOTE
[x] daily progress note       [] discharge       Patient Name:  Delfina Madden   :  1945 MRN: [id number]  Room:  [address] [place], [place] [address] Date of Admission: 2022  Rehabilitation Diagnosis:   Acute CVA (cerebrovascular accident) [facility]) [I63.9]       Date 2022       Day of ARU Week:  3   Time IN/OUT 1293-0073  [id number]   Individual Tx Minutes 120   TOTAL Tx Time Mins 120   Restrictions Restrictions/Precautions  Restrictions/Precautions: General Precautions,Fall Risk (Hx of anxiety disorder and agoraphobia)      Communication with other providers: [x]   OK to see per nursing:     []   Spoke with team member regarding:      Subjective observations and cognitive status: AM session: pt seen sitting up in recliner at beginning of treatment. Agreeable to therapy. PM session: pt seen in semi-luna's position in bed at beginning of treatment. Agreeable to therapy. Pain level/location: 0/10          Discharge recommendations  Anticipated discharge date:  TBD  Destination: []home alone   []home alone with assist PRN     [] home w/ family      [] Continuous supervision  []SNF    [] Assisted living     [] Other:   Continued therapy: []HHC PT  []OUTPATIENT  PT   [] No Further PT  Equipment needs: TBD     Bed Mobility:           [x]   Pt received out of bed   Lying --> Sit: Setup assist with bed rails   Sit --> lying:  Setup assist with bed rails     Transfers:    Sit--> Stand: SBA  Stand --> Sit:   SBA  Chair-->Bed/Bed --> Chair:  SBA without AD   Car Transfers: SBA  Toilet Transfer: SBA with grab bars   Assistive device required for transfer:  RW  Vcs for proper hand placement. Gait:    Distance:  AM session: 20'+200'; PM session: 368'  Assistance:  SBA  Device:  RW  Gait Quality: reciprocal pattern; vcs to stay closer to walker.        Stairs (AM session)   # Completed: 13  Assistance:  SBA  Supportive Device: 2 rails     Uneven Surfaces:  (AM session) (2 trials)     Assistance:    SBA  Device:    RW  Surfaces Completed:   [x]  Carpeted Surface with bean bags beneath      []  Throw rugs       []  Ramp       []  Outdoor pavements        []  Grass             []  Loose gravel        []  Other:        2 trials: Pt amb up/down 4\" curb step SBA with RW. Vcs for walker safety. Additional Therapeutic activities/exercises completed this date:     [x]   Nu-step: PM session:  Time: 10 minutes       Level: 1    (for strengthening and endurance training)     []   Rebounder:    []  Seated     []  Standing        [x]   Balance training: AM session: pt stood at toilet and performed pull-up and pull-down of briefs and pants supervision for balance with RW. Pt stood at sink and performed washing and drying of hands SBA with use countertop. []   Postural training    []   Supine ther ex (reps/sets):     []   Seated ther ex (reps/sets):     [x]   Standing ther ex (reps/sets): PM session: marching, hip extension, hip abduction, heel raises, toe raises, mini-squats, knee flexion x 10 reps each for strengthening. Provided patient with HEP handout and specified 10 reps. Also specified to use countertop for stability instead of chair. Specified to stay in a pain-free range and to take rest breaks as needed. [x]   Picking up object from floor (standing): SBA with RW  (AM session)      []   Other:   []   Other:    Patient/Caregiver Education and Training:   [x]   Bed Mobility/Transfer technique/safety  [x]   Gait technique/sequencing  [x]   Proper use of assistive device  [x]   Advanced mobility safety and technique  []   Reinforced patient's precautions/mobility while maintaining precautions  []   Postural awareness  []   Family training    Treatment Plan for Next Session: gait; transfers; advanced gait; stair training; exercises; balance training      Assessment: This pt demonstrated a positive response to today's treatment as evidenced by improved mobility.   The patient is making progress toward established goals as evidenced by Needs assistance  Homemaking Responsibilities: Yes  Meal Prep Responsibility: Primary (Pt uses the microwave and will make sandwiches.)  Laundry Responsibility: No (Son and daughters complete.)  Cleaning Responsibility: Primary  Bill Paying/Finance Responsibility: Primary  Shopping Responsibility: Secondary (Shared responsibility with son.)  Health Care Management: Primary  Ambulation Assistance: Independent  Transfer Assistance: Independent  Active : Yes  Mode of Transportation: Car  Education: HS education  Occupation: Retired  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 Housework. Grocery  Pt manages meds via pill bottles. Finances are completed via auto deposit. IADL Comments: Nuzhat Estevez help from son: Korin Inman  Additional Comments: Pt has regular flat bed at home. Pt reports 3 falls in last year with no significant injuries. Pt is R hand dominant. Objective                                                                                    Goals:  (Update in navigator)  Short Term Goals  Time Frame for Short term goals: 10 tx days:  Short term goal 1: Pt will complete sup<->sit Ind. Short term goal 2: Pt will complete OOB transfers Mod Ind-Ind. Short term goal 3: Pt will ambulate 10 ft and 50 ft with turns over level surface using LRAD Mod Ind-Ind. Short term goal 4: Pt will ambulate at least 10 ft of uneven surface and 150 ft over level surface using LRAD with SBA-Sup. Short term goal 5: Pt will ascend/descend curb step using LRAD and 1 flight of stairs using railings with SBA-Sup. Additional Goals?: Yes  Short Term Goal 6: Pt will ascend/descend 4-5 steps using railings Mod Ind.   Short Term Goal 7: Pt will complete object retrieval from the floor in standing with 1UE support Mod Ind.:   :        Plan of Care                                                                              Times per week: 5 days per week for a minimum of 60 minutes/day plus group as appropriate for 60 minutes.   Treatment to include Current Treatment Recommendations: Strengthening,Balance training,Functional mobility training,Transfer training,Endurance training,Gait training,Stair training,Home exercise program,Safety education & training,Patient/Caregiver education & training,Equipment evaluation, education, & procurement,Therapeutic activities    Electronically signed by   Carlos Mercer, [id number]  6/8/2022, 10:57 AM

## 2022-06-08 NOTE — PROGRESS NOTES
Becky Quintero    : 1945  Acct #: [de-identified]  MRN: [id number]              PM&R Progress Note      Admitting diagnosis: Acute CVA ( [address])     Comorbid diagnoses impacting rehabilitation: Generalized weakness, gait disturbance, essential hypertension, acute kidney injury, hyperkalemia, uncontrolled diabetes type 2 with peripheral neuropathy, depression with anxiety, uncontrolled pain (left lower limb), mixed hyperlipidemia, nicotine addiction     Chief complaint: Fatigue with activity. Some loss of balance with standing tasks in therapy. Prior (baseline) level of function: Independent. Current level of function:         Current  IRF-CRISTIN and Goals:   Occupational Therapy:    Short Term Goals  Time Frame for Short term goals: STGs=LTGs :   Long Term Goals  Time Frame for Long term goals : 7-10 days or until d/c. Long Term Goal 1: Pt will complete self-feeding c Mod I.  Long Term Goal 2: Pt will complete grooming and oral care tasks c Mod I.  Long Term Goal 3: Pt will complete total body bathing c AE PRN and Mod I.  Long Term Goal 4: Pt will complete UB dressing c Mod I.  Long Term Goal 5: Pt will complete LB dressing c AE PRN and Mod I. Additional Goals?: Yes  Long Term Goal 6: Pt will doff/don footwear c AE PRN and Mod I.  Long Term Goal 7: Pt will perform toileting c Mod I.  Long Term Goal 8: Pt will perform functional transfers (bed, chair, toilet, shower) c DME PRN and Mod I.  Long Term Goal 9: Pt will perform therex/therax to facilitate an increase in strength/endurance/ax tolerance (c emphasis on dynamic standing balance/tolerance > 8 mins, BUE strength, FMC) c Mod I.  Long Term Goal 10:  Pt will complete light home management tasks c Mod I. :                                       Eating: Eating  Assistance Needed: Independent  Comment: X  CARE Score: 6  Discharge Goal: Independent       Oral Hygiene: Oral Hygiene  Assistance Needed: Independent  Comment: Manages and completes denture care MOd I seated sinkside  CARE Score: 6  Discharge Goal: Independent    UB/LB Bathing: Shower/Bathe Self  Assistance Needed: Supervision or touching assistance  Comment: SB/CGA in stance, seated majority of shower  CARE Score: 4  Discharge Goal: Independent    UB Dressing: Upper Body Dressing  Assistance Needed: Supervision or touching assistance  Comment: SB/CGA to bring back of short down in stance, verbal cues required for sequencing  CARE Score: 4  Discharge Goal: Independent         LB Dressing: Lower Body Dressing  Assistance Needed: Supervision or touching assistance  Comment: SB/CGA in stance to manage over hips, threeads B LE into pants using figure four mathod  CARE Score: 4  Discharge Goal: Independent    Donning and [place] Footwear: Putting On/Taking Off Footwear  Assistance Needed: Setup or clean-up assistance  Comment: Set up  CARE Score: 5  Discharge Goal: Independent      Toiletin [address] needed: Supervision or touching assistance  Comment: CGA  CARE Score: 4  Discharge Goal: Independent      Toilet Transfers: Toilet Transfer  Assistance needed: Supervision or touching assistance  Comment: CGA cues for hand foot and body placement using grab bars and anticipating sitting prior to squaring up to toilet  CARE Score: 4  Discharge Goal: Independent    Physical Therapy:   Short Term Goals  Time Frame for Short term goals: 10 tx days:  Short term goal 1: Pt will complete sup<->sit Ind. Short term goal 2: Pt will complete OOB transfers Mod Ind-Ind. Short term goal 3: Pt will ambulate 10 ft and 50 ft with turns over level surface using LRAD Mod Ind-Ind. Short term goal 4: Pt will ambulate at least 10 ft of uneven surface and 150 ft over level surface using LRAD with SBA-Sup. Short term goal 5: Pt will ascend/descend curb step using LRAD and 1 flight of stairs using railings with SBA-Sup.   Additional Goals?: Yes  Short Term Goal 6: Pt will ascend/descend 4-5 steps using railings Mod Ind.  Short Term Goal 7: Pt will complete object retrieval from the floor in standing with 1UE support Mod Ind.             Bed Mobility:   Sit to Lying  Assistance Needed: Supervision or touching assistance  Comment: CGA without use of bed features  (pt crawls up in bed requiring steadying assist)  CARE Score: 4  Discharge Goal: Independent  Roll Left and Right  Assistance Needed: Independent  Comment: Ind without use of bed features  CARE Score: 6  Discharge Goal: Independent  Lying to Sitting on Side of Bed  Assistance Needed: Supervision or touching assistance  Comment: SBA without use of bed features; midline trunk control intact  CARE Score: 4  Discharge Goal: Independent    Transfers:    Sit to Stand  Assistance Needed: Supervision or touching assistance  Comment: CGA without AD  CARE Score: 4  Discharge Goal: Independent  Chair/Bed-to-Chair Transfer  Assistance Needed: Supervision or touching assistance  Comment: CGA (pt automatically reached out for armrest of chair to turn and sit)  CARE Score: 4  Discharge Goal: Independent     Car Transfer  Assistance Needed: Partial/moderate assistance  Comment: Min A to get in as pt stepped in x 2 trials due to decreased strength of LLE to fully clear over ledge of car; CGA to get up and complete transfer out of car; pt used car as external support to complete transfers  CARE Score: 3  Discharge Goal: Independent    Ambulation:    Walking Ability  Does the Patient Walk?: Yes     Walk 10 Feet  Assistance Needed: Partial/moderate assistance  Comment: Min A without AD  CARE Score: 3     Walk 50 Feet with Two Turns  Assistance Needed: Partial/moderate assistance  Comment: Min A without AD  CARE Score: 3     Walk 150 Feet  Comment: pt was not performing this at baseline, however demonstrates potential to progressing to this mobility task; max tolerance today was 54 ft without AD with Min A (limited by fatigue)  Reason if not Attempted: Not attempted due to medical condition or safety concerns  CARE Score: 88  Discharge Goal: Supervision or touching assistance     Walking 10 Feet on Uneven Surfaces  Assistance Needed: Partial/moderate assistance  Comment: Min A without AD  CARE Score: 3  Discharge Goal: Independent     1 Step (Curb)  Assistance Needed: Partial/moderate assistance  Comment: Min A (required HHA to be able to shift weight on ascent/descent)  CARE Score: 3  Discharge Goal: Supervision or touching assistance     4 Steps  Assistance Needed: Supervision or touching assistance  Comment: CGA using railings on 4\"/6\" step heights with step-through pattern on ascent and step-to pattern on descent  CARE Score: 4  Discharge Goal: Independent     12 Steps  Comment: max tolerance was 5 steps (limited by fatigue and R knee pain)  Reason if not Attempted: Not attempted due to medical condition or safety concerns  CARE Score: 88  Discharge Goal: Supervision or touching assistance       Wheelchair:  w/c Ability: Wheelchair Ability  Uses a Wheelchair and/or Scooter?: No                Balance:        Object: Picking Up Object  Assistance Needed: Supervision or touching assistance  Comment: CGA with L UE support; task completed without use of reacher  CARE Score: 4  Discharge Goal: Independent    I      Exam:    Blood pressure (!) 120/55, pulse 56, temperature 97.9 °F (36.6 °C), resp. rate 17, height 5' 5\" (1.651 m), weight 193 lb 5.5 oz (87.7 kg), SpO2 93 %, not currently breastfeeding. General: Lying back in bed resting quietly. HEENT: Deliberate speech pattern. No drooling. Neck supple. Pulmonary: Unlabored respirations without wheezing or rales. Cardiac: Regular rate and rhythm. Abdomen: Patient's abdomen is soft and nondistended. Bowel sounds were present throughout. There was no rebound, guarding or masses noted. Upper extremities: Clumsy movements of both upper limbs. No significant swelling or bruising. Lower extremities: Calves soft. Heels clear. No signs of DVT. Clumsy weak movements across each ankle. Sitting balance was fair. Standing balance was poor. Lab Results   Component Value Date    WBC 7.5 06/02/2022    HGB 15.2 06/02/2022    HCT 47.6 (H) 06/02/2022    .9 (H) 06/02/2022     06/02/2022     No results found for: INR, PROTIME  Lab Results   Component Value Date    CREATININE 1.6 (H) 06/04/2022    BUN 46 (H) 06/04/2022     (L) 06/04/2022    K 4.2 06/04/2022     06/04/2022    CO2 16 (L) 06/04/2022     Lab Results   Component Value Date    ALT 21 06/04/2022    AST 26 06/04/2022    ALKPHOS 81 06/04/2022    BILITOT 0.3 06/04/2022       Expected length of stay  prior to a supervised level of function for discharge home with a walker and [facility] OT/PT is 2 weeks. Recommendations:    1. Acute CVA with generalized weakness:  Significant verbosity during her daily occupational and physical therapy with speech-language pathology. Continues to be self distracting from the task at hand. She will benefit from adaptive equipment training, aggressive pulmonary hygiene measures and DVT prophylaxis.  He is eating better. dooub education planned.  Antiplatelet therapy with Plavix and aspirin for 21 days then withdrawing the Plavix.  Statin therapy. Verbal cues and minimum physical assistance needed for transfers again today. 2. DVT prophylaxis: Heparin 5000 units every 8 hours.  I must periodically monitor her hemoglobin and platelet count while on this medication.  Weightbearing activities are slowly improving daily.  GI prophylaxis is available. No new bruising or swelling. 3. Uncontrolled diabetes type 2 with hypoglycemia: Because of poor oral intake she remains a regular diet. Monitoring blood sugars twice daily.  Encouraging consistent oral intake.    4. Acute kidney injury: Avoiding nephrotoxic medications.  Encouraging consistent oral hydration.  Periodic monitoring of her chemistries.   5. Depression with anxiety: Sleep regulation, cautious pain management and BuSpar 15 mg twice daily.  Lorazepam as needed.  Encouraging family involvement with caregiver training. 6. Nicotine addiction: NicoDerm. 7. Hypertension: Lopressor and Norvasc for blood pressure regulation.  Target systolic blood pressures [id number].  Vital signs are checked at rest and with activity.   Blood pressure within target range today.

## 2022-06-08 NOTE — PATIENT CARE CONFERENCE
ACUTE REHAB TEAM CONFERENCE SUMMARY   [facility]    NAME: Abbey Goodpasture  : 1945 ADMIT DATE: 2022    Rehab Admitting Dx:Acute CVA (cerebrovascular accident) [facility]) [I63.9]  Patient Comorbid Conditions: Active Hospital Problems    Diagnosis Date Noted    Gait disturbance [R26.9]      Priority: Medium    Cigarette nicotine dependence without complication [J00.891]      Priority: Medium    Uncontrolled pain [R52]      Priority: Medium    Acute CVA (cerebrovascular accident) ([address] [I63.9] 2022     Priority: Medium    Uncontrolled type 2 diabetes mellitus with hypoglycemia without coma ([facility] [E11.649] 2018    Essential hypertension [I10] 2018    Depression with anxiety [F41.8] 2018    Acute kidney injury (RICH) with acute tubular necrosis (ATN) ([facility] [address] [N17.0] 2018     Date: 2022    CASE MANAGEMENT  Current issues/needs regarding patient and family discharge status:   Patient plans dc 2-level home with adult children. 1 [address]. SC/indep/drive PTA    PHYSICAL THERAPY (Updated in QI)  Short Term Goals  Time Frame for Short term goals: 10 tx days:  Short term goal 1: Pt will complete sup<->sit Ind. Short term goal 2: Pt will complete OOB transfers Mod Ind-Ind. Short term goal 3: Pt will ambulate 10 ft and 50 ft with turns over level surface using LRAD Mod Ind-Ind. Short term goal 4: Pt will ambulate at least 10 ft of uneven surface and 150 ft over level surface using LRAD with SBA-Sup. Short term goal 5: Pt will ascend/descend curb step using LRAD and 1 flight of stairs using railings with SBA-Sup. Additional Goals?: Yes  Short Term Goal 6: Pt will ascend/descend 4-5 steps using railings Mod Ind. Short Term Goal 7: Pt will complete object retrieval from the floor in standing with 1UE support Mod Ind. Impairments/deficits, barriers:     Body Structures, Functions, Activity Limitations Requiring Skilled Therapeutic Intervention: Decreased functional mobility ,Decreased strength,Decreased safe awareness,Decreased endurance,Decreased balance,Decreased high-level IADLs,Increased pain,Decreased cognition     Therapy Prognosis: Good  Decision Making: High Complexity  Clinical Presentation: unstable/unpredictable characteristics  Equipment needed at discharge: considering 2WW; has SPC       PT IRF-CRISTIN scores since initial assessment  Bed Mobility:   Sit to Lying  Assistance Needed: Setup or clean-up assistance  Comment: Use of rails and bed features  CARE Score: 5  Discharge Goal: Independent    Roll Left and Right  Assistance Needed: Independent  Comment: Ind without use of bed features  CARE Score: 6  Discharge Goal: Independent    Lying to Sitting on Side of Bed  Assistance Needed: Setup or clean-up assistance  Comment: Use of rails and bed features  CARE Score: 5  Discharge Goal: Independent    Transfers:    Sit to Stand  Assistance Needed: Supervision or touching assistance  Comment: SBA with RW  CARE Score: 4  Discharge Goal: Independent    Chair/Bed-to-Chair Transfer  Assistance Needed: Supervision or touching assistance  Comment: SBA without RW  CARE Score: 4  Discharge Goal: Independent         Car Transfer  Assistance Needed: Supervision or touching assistance  Comment: SBA with RW  CARE Score: 4  Discharge Goal: Independent    Ambulation:    Walking Ability  Does the Patient Walk?: Yes     Walk 10 Feet  Assistance Needed: Supervision or touching assistance  Comment: SBA with RW  CARE Score: 4     Walk 50 Feet with Two Turns  Assistance Needed: Supervision or touching assistance  Comment: SBA with RW  CARE Score: 4     Walk 150 Feet  Assistance Needed: Supervision or touching assistance  Comment: SBA with RW  Reason if not Attempted: Not attempted due to medical condition or safety concerns  CARE Score: 4  Discharge Goal: Supervision or touching assistance     Walking 10 Feet on Uneven Surfaces  Assistance Needed: Supervision or touching assistance  Comment: SBA with RW  CARE Score: 4  Discharge Goal: Independent     1 Step (Curb)  Assistance Needed: Supervision or touching assistance  Comment: SBA with RW (4\" height step)  CARE Score: 4  Discharge Goal: Supervision or touching assistance     4 Steps  Assistance Needed: Supervision or touching assistance  Comment: SBA with 2 rails (4\" and 6\" step heights)  CARE Score: 4  Discharge Goal: Independent     12 Steps  Assistance Needed: Supervision or touching assistance  Comment: SBA with 2 rails (4\" and 6\" step heights)  Reason if not Attempted: Not attempted due to medical condition or safety concerns  CARE Score: 4  Discharge Goal: Supervision or touching assistance           Wheelchair:  w/c Ability: Wheelchair Ability  Uses a Wheelchair and/or Scooter?: No                        Balance:        Object: Picking Up Object  Assistance Needed: Supervision or touching assistance  Comment: SBA without use of reacher  CARE Score: 4  Discharge Goal: Independent    Fall Risk: []  Yes  []  No    OCCUPATIONAL THERAPY  (Updated in QI)  Short Term Goals  Time Frame for Short term goals: STGs=LTGs :   Long Term Goals  Time Frame for Long term goals : 7-10 days or until d/c. Long Term Goal 1: Pt will complete self-feeding c Mod I.  Long Term Goal 2: Pt will complete grooming and oral care tasks c Mod I.  Long Term Goal 3: Pt will complete total body bathing c AE PRN and Mod I.  Long Term Goal 4: Pt will complete UB dressing c Mod I.  Long Term Goal 5: Pt will complete LB dressing c AE PRN and Mod I.   Additional Goals?: Yes  Long Term Goal 6: Pt will doff/don footwear c AE PRN and Mod I.  Long Term Goal 7: Pt will perform toileting c Mod I.  Long Term Goal 8: Pt will perform functional transfers (bed, chair, toilet, shower) c DME PRN and Mod I.  Long Term Goal 9: Pt will perform therex/therax to facilitate an increase in strength/endurance/ax tolerance (c emphasis on dynamic standing balance/tolerance > 8 mins, BUE strength, FMC) c Mod I.  Long Term Goal 10: Pt will complete light home management tasks c Mod I. :                                       OT IRF-CRISTIN scores and goals since initial assessment:    ADLs:    Eating: Eating  Assistance Needed: Independent  Comment: X  CARE Score: 6  Discharge Goal: Independent       Oral Hygiene: Oral Hygiene  Assistance Needed: Independent  Comment: Manages and completes denture care MOd I seated sinkside  CARE Score: 6  Discharge Goal: Independent    UB/LB Bathing: Shower/Bathe Self  Assistance Needed: Supervision or touching assistance  Comment: SB/CGA in stance, seated majority of shower  CARE Score: 4  Discharge Goal: Independent    UB Dressing: Upper Body Dressing  Assistance Needed: Setup or clean-up assistance  Comment: Set up  CARE Score: 5  Discharge Goal: Independent         LB Dressing: Lower Body Dressing  Assistance Needed: Supervision or touching assistance  Comment: SB/CGA in stance to manage over hips, threeads B LE into pants using figure four mathod  CARE Score: 4  Discharge Goal: Independent    Donning and [place] Footwear: Putting On/Taking Off Footwear  Assistance Needed: Setup or clean-up assistance  Comment: Set up  CARE Score: 5  Discharge Goal: Independent      Toiletin [address] needed: Supervision or touching assistance  Comment: Sup in stance to manage over hips  CARE Score: 4  Discharge Goal: Independent      Toilet Transfers:    Toilet Transfer  Assistance needed: Supervision or touching assistance  Comment: SBA  CARE Score: 4  Discharge Goal: Independent      Impairments/deficits, barriers:  Functional balance, endurance, strength, FMC, and ADLs  Assessment  Performance deficits / Impairments: Decreased functional mobility ,Decreased safe awareness,Decreased balance,Decreased coordination,Decreased ADL status,Decreased cognition,Decreased endurance,Decreased high-level IADLs,Decreased strength,Decreased fine motor control  Decision Making: Medium Complexity  Equipment needed at discharge:None. COGNITIVE FUNCTION/SPEECH THERAPY (AS INDICATED)  LTG         Duration/Frequency of Treatment  Duration of Treatment: No ST recommended                     Short-term Goals  Timeframe for Short-term Goals: No therapy at this time unless PT/OT have concerns in fx. Nursing Current Medical Status:   [] Is continent of bowel and bladder     [] Is incontinent of bowel and bladder    [] Has had an adequate number of bowel movements   [] Urinates with no urinary retention >300ml in bladder   [] Targeting bladder protocol with carreno removal   [x] Maintaining O2 SATs at 92% or greater   [] Has pain managed while on ARU         [x] Has had no skin breakdown while on ARU   [] Has improved skin integrity via wound measurements   [] Has no signs/symptoms of infection at the wound site   [] Pressure wounds Stage/Location:    [] Arrived on unit with pressure wound  [] Has been free from injury during hospitalization   [] Has experienced a fall during hospitalization  [] Ongoing education with patient/family with understanding demonstrated for:  [] Receives IV Fluids  [] Other:        NUTRITION  Weight: 193 lb 9 oz (87.8 kg) / Body mass index is 32.21 kg/m². Current diet: ADULT DIET; Regular; Low Sodium (2 gm); Low Potassium (Less than 3000 mg/day); Low Phosphorus (Less than 1000 mg)  Intake: Meal intake %, dislikes oral nutrition supplement      Medical improvements/barriers: progressing well, safety concerns, some anxiety,         Team goals for next treatment period/Intervention for current barriers:   [x] Pt will increase activity tolerance for daily tasks.   [] Pt will improve bed mobility with reduced assist.  [x] Pt will improve safety in fx tasks with reduced cues/assist  [] Pt will improve transfers with reduced assist  [x] Pt will improve toileting with reduced assist  [x] Pt will improve ADL's with use of adaptive equipment with reduced assist  [] Pt will improve pain mgmt for maximum participation in tx program  [] Pt will improve communication to get basic needs met on unit  [] Pt will improve swallowing for safe diet advancement with use of strategies  []  Plan for discharge to home. Patient Strengths: Motivated, Cooperative and Pleasant    Justification for Continued Stay  Based on my medical assessment of the patient and review of information from the interdisciplinary team as part of this weekly team conference, the patient continues to meet the following criteria for IRF level of care:   The patient requires active and ongoing intervention of multiple therapy disciplines   The patient requires and intensive rehabilitation therapy program   The patient requires continued physician supervision by a rehabilitation physician   The patient requires 24 hours rehab nursing care   The patient requires an intensive and coordinated interdisciplinary team approach to the delivery of rehabilitative care.      Assessment/Plan   [x]  The patient is making good progression towards their long term goals and is actively participating in and has a reasonable expectation to continue to benefit from the intensive rehabilitation therapy program   []  The estimated discharge date has been changed from initial team conference due to:   []  The estimated discharge destination has been changed from initial team conference due to:         Ongoing tx following discharge: [x][facility] OT, PT     []OUTPATIENT     [] No Further Treatment     [] Family/Caregiver Training  []  Transitional Living Arrangement   [] Home Assessment (date  )     [] Family Conference   []  Therapeutic Pass       []  Other: (specify)    Estimated Discharge Date: 6/11/22    Estimated Discharge Destination: []home alone   []home alone with assist prn  []Continuous supervision [x]Return home with s/o/spouse/family   [] Assisted living    []SNF     Team members participating in today's conference. [x] Lea Burr, Medical Director  [] Yany Gilbert,       [x] Marlena Ayers, RN Nurse Supervisor     [x]  Fabi Graf, PT  []  Anuradha Cage, PT       [] Shana Corrales, OT   [x] Mahin Castle, OT  [] Ford Llanes, OT     [x]  Sharonda Rader, SLP    []  Keira Cao, CLARK   [] Hyun Johnson, CLARK      [] Annel Paulson Mgr   [x]Nataly Crawford,      [] Rosetta Jacobson RN   [] Esperanza Beltre RN    [] Wendy Tello RN    [] Jeffrey Fernando, GREGG    [] Deborah Elizondo RN   [] Alyssa Salazar RN   [] Ary Mantilla, RN Nurse Mgr     I have led this Team Conference and agree with the plan, Lea Burr MD, 6/9/2022, 12:33 PM  Goals have been updated to reflect recent status.     Team conference note transcribed this date by: Michael Huang MA, [id number] [facility], Therapy Coordinator

## 2022-06-08 NOTE — PROGRESS NOTES
Sybil Mathews    : 1945  Acct #: [de-identified]  MRN: [id number]              PM&R Progress Note      Admitting diagnosis: Acute CVA ( [place] [address])     Comorbid diagnoses impacting rehabilitation: Generalized weakness, gait disturbance, essential hypertension, acute kidney injury, hyperkalemia, uncontrolled diabetes type 2 with peripheral neuropathy, depression with anxiety, uncontrolled pain (left lower limb), mixed hyperlipidemia, nicotine addiction     Chief complaint: Expressed awareness that her anxieties are active. Prior (baseline) level of function: Independent. Current level of function:         Current  IRF-CRISTIN and Goals:   Occupational Therapy:    Short Term Goals  Time Frame for Short term goals: STGs=LTGs :   Long Term Goals  Time Frame for Long term goals : 7-10 days or until d/c. Long Term Goal 1: Pt will complete self-feeding c Mod I.  Long Term Goal 2: Pt will complete grooming and oral care tasks c Mod I.  Long Term Goal 3: Pt will complete total body bathing c AE PRN and Mod I.  Long Term Goal 4: Pt will complete UB dressing c Mod I.  Long Term Goal 5: Pt will complete LB dressing c AE PRN and Mod I. Additional Goals?: Yes  Long Term Goal 6: Pt will doff/don footwear c AE PRN and Mod I.  Long Term Goal 7: Pt will perform toileting c Mod I.  Long Term Goal 8: Pt will perform functional transfers (bed, chair, toilet, shower) c DME PRN and Mod I.  Long Term Goal 9: Pt will perform therex/therax to facilitate an increase in strength/endurance/ax tolerance (c emphasis on dynamic standing balance/tolerance > 8 mins, BUE strength, FMC) c Mod I.  Long Term Goal 10:  Pt will complete light home management tasks c Mod I. :                                       Eating: Eating  Assistance Needed: Setup or clean-up assistance  Comment: Setup assist to open butter and seasoning packets for breakfast.  CARE Score: 5  Discharge Goal: Independent       Oral Hygiene: Oral Hygiene  Assistance Needed: Setup or clean-up assistance  Comment: Setup assist to open toothpaste. CARE Score: 5  Discharge Goal: Independent    UB/LB Bathing: Shower/Bathe Self  Assistance Needed: Supervision or touching assistance  Comment: CGA in stance to wash up at sink; majority seated. CARE Score: 4  Discharge Goal: Independent    UB Dressing: Upper Body Dressing  Assistance Needed: Supervision or touching assistance  Comment: CGA in stance to pull down pullover tshirt. CARE Score: 4  Discharge Goal: Independent         LB Dressing: Lower Body Dressing  Assistance Needed: Partial/moderate assistance  Comment: Pt required Min A to get L foot threaded into depends. Pt able to doff/don pants with CGA in stance to manage over hips. CARE Score: 3  Discharge Goal: Independent    Donning and [place] Footwear: Putting On/Taking Off Footwear  Assistance Needed: Partial/moderate assistance  Comment: Pt able to doff/don socks, however required assistance getting both feet into shoes. CARE Score: 3  Discharge Goal: Independent      Toiletin [address] needed: Supervision or touching assistance  Comment: Pt CG/SBA in stance to manage clothing; Pt completed perineal hygiene seated after episode of urination with SBA d/t Pt with far L lateral lean. CARE Score: 4  Discharge Goal: Independent      Toilet Transfers: Toilet Transfer  Assistance needed: Supervision or touching assistance  Comment: CGA with 2WW and grab bars and Max verbal cues for safety. CARE Score: 4  Discharge Goal: Independent    Physical Therapy:   Short Term Goals  Time Frame for Short term goals: 10 tx days:  Short term goal 1: Pt will complete sup<->sit Ind. Short term goal 2: Pt will complete OOB transfers Mod Ind-Ind. Short term goal 3: Pt will ambulate 10 ft and 50 ft with turns over level surface using LRAD Mod Ind-Ind. Short term goal 4: Pt will ambulate at least 10 ft of uneven surface and 150 ft over level surface using LRAD with SBA-Sup.   Short term goal 5: Pt will ascend/descend curb step using LRAD and 1 flight of stairs using railings with SBA-Sup. Additional Goals?: Yes  Short Term Goal 6: Pt will ascend/descend 4-5 steps using railings Mod Ind. Short Term Goal 7: Pt will complete object retrieval from the floor in standing with 1UE support Mod Ind.             Bed Mobility:   Sit to Lying  Assistance Needed: Supervision or touching assistance  Comment: CGA without use of bed features  (pt crawls up in bed requiring steadying assist)  CARE Score: 4  Discharge Goal: Independent  Roll Left and Right  Assistance Needed: Independent  Comment: Ind without use of bed features  CARE Score: 6  Discharge Goal: Independent  Lying to Sitting on Side of Bed  Assistance Needed: Supervision or touching assistance  Comment: SBA without use of bed features; midline trunk control intact  CARE Score: 4  Discharge Goal: Independent    Transfers:    Sit to Stand  Assistance Needed: Supervision or touching assistance  Comment: CGA without AD  CARE Score: 4  Discharge Goal: Independent  Chair/Bed-to-Chair Transfer  Assistance Needed: Supervision or touching assistance  Comment: CGA (pt automatically reached out for armrest of chair to turn and sit)  CARE Score: 4  Discharge Goal: Independent     Car Transfer  Assistance Needed: Partial/moderate assistance  Comment: Min A to get in as pt stepped in x 2 trials due to decreased strength of LLE to fully clear over ledge of car; CGA to get up and complete transfer out of car; pt used car as external support to complete transfers  CARE Score: 3  Discharge Goal: Independent    Ambulation:    Walking Ability  Does the Patient Walk?: Yes     Walk 10 Feet  Assistance Needed: Partial/moderate assistance  Comment: Min A without AD  CARE Score: 3     Walk 50 Feet with Two Turns  Assistance Needed: Partial/moderate assistance  Comment: Min A without AD  CARE Score: 3     Walk 150 Feet  Comment: pt was not performing this at baseline, however demonstrates potential to progressing to this mobility task; max tolerance today was 54 ft without AD with Min A (limited by fatigue)  Reason if not Attempted: Not attempted due to medical condition or safety concerns  CARE Score: 88  Discharge Goal: Supervision or touching assistance     Walking 10 Feet on Uneven Surfaces  Assistance Needed: Partial/moderate assistance  Comment: Min A without AD  CARE Score: 3  Discharge Goal: Independent     1 Step (Curb)  Assistance Needed: Partial/moderate assistance  Comment: Min A (required HHA to be able to shift weight on ascent/descent)  CARE Score: 3  Discharge Goal: Supervision or touching assistance     4 Steps  Assistance Needed: Supervision or touching assistance  Comment: CGA using railings on 4\"/6\" step heights with step-through pattern on ascent and step-to pattern on descent  CARE Score: 4  Discharge Goal: Independent     12 Steps  Comment: max tolerance was 5 steps (limited by fatigue and R knee pain)  Reason if not Attempted: Not attempted due to medical condition or safety concerns  CARE Score: 88  Discharge Goal: Supervision or touching assistance       Wheelchair:  w/c Ability: Wheelchair Ability  Uses a Wheelchair and/or Scooter?: No                Balance:        Object: Picking Up Object  Assistance Needed: Supervision or touching assistance  Comment: CGA with L UE support; task completed without use of reacher  CARE Score: 4  Discharge Goal: Independent    I      Exam:    Blood pressure 130/65, pulse 55, temperature 97.6 °F (36.4 °C), temperature source Oral, resp. rate 16, height 5' 5\" (1.651 m), weight 191 lb 9.3 oz (86.9 kg), SpO2 93 %, not currently breastfeeding. General: Semirecumbent in bed. Intermittently tearful. Quite talkative and verbose. Tangential.  Occasional word finding difficulty. HEENT: Minimal dysarthria. Gazing right and left. Neck supple. No JVD. Pulmonary: Clear and unlabored.     Cardiac: RRR.    Abdomen: Patient's abdomen is soft and nondistended. Bowel sounds were present throughout. There was no rebound, guarding or masses noted. Upper extremities: Clumsy movements of both upper limbs with weak  bilaterally. No tremor or atrophy noted. Lower extremities: No signs of DVT. Clumsy movements within the bed. Calf soft. Sitting balance was fair. Standing balance was poor. Lab Results   Component Value Date    WBC 7.5 06/02/2022    HGB 15.2 06/02/2022    HCT 47.6 (H) 06/02/2022    .9 (H) 06/02/2022     06/02/2022     No results found for: INR, PROTIME  Lab Results   Component Value Date    CREATININE 1.6 (H) 06/04/2022    BUN 46 (H) 06/04/2022     (L) 06/04/2022    K 4.2 06/04/2022     06/04/2022    CO2 16 (L) 06/04/2022     Lab Results   Component Value Date    ALT 21 06/04/2022    AST 26 06/04/2022    ALKPHOS 81 06/04/2022    BILITOT 0.3 06/04/2022       Expected length of stay  prior to a supervised level of function for discharge home with a walker and C OT/PT is 2 weeks. Recommendations:    1. Acute CVA with generalized weakness: Developing the routine for her daily occupational and physical therapy with speech-language pathology. She is quite self distracting from the task at hand. Ongoing adaptive equipment training, aggressive pulmonary hygiene measures and DVT prophylaxis. We must provide nutritional support while maximizing blood sugar and blood pressure treatment. Caregiver education planned. Antiplatelet therapy with Plavix and aspirin for 21 days then withdrawing the Plavix. Statin therapy. Verbal cues and minimum physical assistance needed for transfers. 2. DVT prophylaxis: Heparin 5000 units every 8 hours. I must periodically monitor her hemoglobin and platelet count while on this medication. Weightbearing activities are pursued daily. GI prophylaxis is available. No signs of acute blood loss.   3. Uncontrolled diabetes type 2 with hypoglycemia: Because of poor oral intake she remains a regular diet. Monitoring blood sugars twice daily. Encouraging consistent oral intake. 4. Acute kidney injury: Avoiding nephrotoxic medications. Encouraging consistent oral hydration. Periodic monitoring of her chemistries. 5. Depression with anxiety: Sleep regulation, cautious pain management and BuSpar 15 mg twice daily. Lorazepam as needed. Encouraging family involvement with caregiver training. 6. Nicotine addiction: NicoDerm. 7. Hypertension: Lopressor and Norvasc for blood pressure regulation. Target systolic blood pressures [id number]. Vital signs are checked at rest and with activity. Blood pressure within target range today.

## 2022-06-08 NOTE — PROGRESS NOTES
Physical Rehabilitation: OCCUPATIONAL THERAPY     [x] daily progress note       [] discharge       Patient Name:  Carmine Hoang   :  1945 MRN: [id number]  Room:  [address] [place], [place] [address] Date of Admission: 2022  Rehabilitation Diagnosis:   Acute CVA (cerebrovascular accident) [facility]) [I63.9]       Date 2022       Day of ARU Week:  3   Time IN//920   Individual Tx Minutes 60   Group Tx Minutes    Co-Treat Minutes    Concurrent Tx Minutes    TOTAL Tx Time Mins 60   Variance Time    Variance Time []   Refusal due to:     []   Medical hold/reason:    []   Illness   []   Off Unit for test/procedure  []   Extra time needed to complete task  []   Therapeutic need  []   Other (specify):   Restrictions Restrictions/Precautions: General Precautions,Fall Risk (Hx of anxiety disorder and agoraphobia)         Communication with other providers: [x]   OK to see per nursing:     []   Spoke with team member regarding:      Subjective observations and cognitive status: Patient sleeping upon approach, easily awalened, pleasant and agreeable to therapy      Pain level/location:    /10       Location: per patient no pain noted    Discharge recommendations  Anticipated discharge date:  TBD  Destination: []home alone   []home alone w assist prn   [x] home w/ family    [] Continuous supervision       []SNF    [] Assisted living     [] Other:   Continued therapy: [x]HHC OT  []OUTPATIENT  OT   [] No Further OT  Equipment needs: Carmine Hoang requires the assistance of a tub transfer bench to successfully and safely complete bathing activities necessary due to reduced balance, endurance, need for ADL assist, and LE weakness and reduced ROM. These tasks cannot be safely completed without this device and would place Carmine Hoang at greater risk of falls.          ADLs:    Eating: Eating  Assistance Needed: Independent  Comment: X  CARE Score: 6  Discharge Goal: Independent       Oral Hygiene: Oral Hygiene  Assistance Needed: Independent  Comment: Manages and completes denture care MOd I seated sinkside  CARE Score: 6  Discharge Goal: Independent    UB/LB Bathing: Shower/Bathe Self  Assistance Needed: Supervision or touching assistance  Comment: SB/CGA in stance, seated majority of shower  CARE Score: 4  Discharge Goal: Independent    UB Dressing: Upper Body Dressing  Assistance Needed: Supervision or touching assistance  Comment: SB/CGA to bring back of short down in stance, verbal cues required for sequencing  CARE Score: 4  Discharge Goal: Independent         LB Dressing: Lower Body Dressing  Assistance Needed: Supervision or touching assistance  Comment: SB/CGA in stance to manage over hips, threeads B LE into pants using figure four mathod  CARE Score: 4  Discharge Goal: Independent    Donning and [place] Footwear: Putting On/Taking Off Footwear  Assistance Needed: Setup or clean-up assistance  Comment: Set up  CARE Score: 5  Discharge Goal: Independent      Toiletin [address] needed: Supervision or touching assistance  Comment: CGA  CARE Score: 4  Discharge Goal: Independent      Toilet Transfers:    Toilet Transfer  Assistance needed: Supervision or touching assistance  Comment: CGA cues for hand foot and body placement using grab bars and anticipating sitting prior to squaring up to toilet  CARE Score: 4  Discharge Goal: Independent  Device Used:    [x]   Standard Toilet         [x]   Grab Bars           []  Bedside Commode       []   Elevated Toilet          []   Other:        Bed Mobility:           []   Pt received out of bed   Rolling R/L:    Scooting:  Sup/SBA  Supine --> Sit:  Sup/SBA  Sit --> Supine:   No bed features used during bed mobility      Transfers:    Sit--> Stand:  SB/CGA  Stand --> Sit:   SB/CGA  Stand-Pivot:   SB/CGA  Other:    Assistive device required for transfer:   None       Functional Mobility:  Throughout room/bathroom   Assistance:  CGA   Device:   []   Ramon Varela     [] Standard Walker []   Wheelchair        []   Fabien West       []   Kameron Black         []   Cardiac Haley Mcgrath       [x]   Other: No device        Homemaking Tasks:   Patient transports clothing from counter top in room to bathroom using no device      Additional Therapeutic activities/exercises completed this date:     [x]   ADL Training   [x]   Balance/Postural training     [x]   Bed/Transfer Training   []   Endurance Training   []   Neuromuscular Re-ed   []   Nu-step:  Time:        Level:         #Steps:       []   Rebounder:    []  Seated     []  Standing        []   Supine Ther Ex (reps/sets):     []   Seated Ther Ex (reps/sets):     []   Standing Ther Ex (reps/sets):     []   Other:      Comments:  Patient requires some encouragement after ADL task that is more difficult than prior to admit, patient educated on diagnosis and prognosis, educated on energy conservation techniques to reduce frustration and increase ease of completing ADL tasks     Patient/Caregiver Education and Training:   [x]   Adaptive Equipment Use  [x]   Bed Mobility/Transfer Technique/Safety  [x]   Energy Conservation Tips  []   Family training  [x]   Postural Awareness  [x]   Safety During Functional Activities  []   Reinforced Patient's Precautions   []   Progress was updated and reviewed in Rehabtracker with patient and/or family this         date.     Treatment Plan for Next Session: POC to continue as tolerated            Treatment/Activity Tolerance:   [x] Tolerated treatment with no adverse effects    [] Patient limited by fatigue  [] Patient limited by pain   [] Patient limited by medical complications:    [] Adverse reaction to Tx:   [] Significant change in status    Safety:       []  bed alarm set    [x]  chair alarm set    []  Pt refused alarms                []  Telesitter activated      [x]  Gait belt used during tx session      []other:       Number of Minutes/Billable Intervention  Therapeutic Exercise    ADL Self-care 45   Neuro Re-Ed    Therapeutic Activity 15   Group    Other:    TOTAL 60       Social History  Social/Functional History  Lives With: Son (Son - Gael)  Type of Home: House  Home Layout: Multi-level,Laundry in basement (Pt lives in a tri-level home. Pt's bedroom and bathroom on top level with 7 stairs with no HR. Pt reports she never has to go to basement level.)  Home Access: Stairs to enter without rails  Entrance Stairs - Number of Steps: 1  Entrance Stairs - Rails: None  Bathroom Shower/Tub: Tub/Shower unit  Bathroom Toilet: Standard  Bathroom Equipment: Shower chair (Possible shower chair, Pt unsure.)  Bathroom Accessibility: Walker accessible  Home Equipment: Cane,Cane, quad  Has the patient had two or more falls in the past year or any fall with injury in the past year?: Yes (Pt reports 3 falls in last year with no significant injuries.)  Receives Help From: Family (Pt lives with son who can assist PRN and has 2 daughters in the area that can assist if needed.)  ADL Assistance: Independent (Pt completing sponge baths at baseline.)  Homemaking Assistance: Needs assistance  Homemaking Responsibilities: Yes  Meal Prep Responsibility: Primary (Pt uses the microwave and will make sandwiches.)  Laundry Responsibility: No (Son and daughters complete.)  Cleaning Responsibility: Primary  Bill Paying/Finance Responsibility: Primary  Shopping Responsibility: Secondary (Shared responsibility with son.)  Health Care Management: Primary  Ambulation Assistance: Independent  Transfer Assistance: Independent  Active : Yes  Mode of Transportation: Car  Education: HS education  Occupation: Retired  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 Housework. Grocery  Pt manages meds via pill bottles. Finances are completed via auto deposit. IADL Comments: Izora Buerger help from son: Gabi Cantu  Additional Comments: Pt has regular flat bed at home. Pt reports 3 falls in last year with no significant injuries. Pt is R hand dominant. Objective                                                                                    Goals:  (Update in navigator)  Short Term Goals  Time Frame for Short term goals: STGs=LTGs:  Long Term Goals  Time Frame for Long term goals : 7-10 days or until d/c. Long Term Goal 1: Pt will complete self-feeding c Mod I.  Long Term Goal 2: Pt will complete grooming and oral care tasks c Mod I.  Long Term Goal 3: Pt will complete total body bathing c AE PRN and Mod I.  Long Term Goal 4: Pt will complete UB dressing c Mod I.  Long Term Goal 5: Pt will complete LB dressing c AE PRN and Mod I. Additional Goals?: Yes  Long Term Goal 6: Pt will doff/don footwear c AE PRN and Mod I.  Long Term Goal 7: Pt will perform toileting c Mod I.  Long Term Goal 8: Pt will perform functional transfers (bed, chair, toilet, shower) c DME PRN and Mod I.  Long Term Goal 9: Pt will perform therex/therax to facilitate an increase in strength/endurance/ax tolerance (c emphasis on dynamic standing balance/tolerance > 8 mins, BUE strength, FMC) c Mod I.  Long Term Goal 10: Pt will complete light home management tasks c Mod I.:        Plan of Care                                                                              Times per week: 5 days per week for a minimum of 60 minutes/day plus group as appropriate for 60 minutes.   Treatment to include Plan  Times per Day: Daily  Current Treatment Recommendations: Strengthening,Balance training,Functional mobility training,Endurance training,Safety education & training,Patient/Caregiver education & training,Equipment evaluation, education, & procurement,Self-Care / ADL,Coordination training,Neuromuscular re-education,Home management training,Cognitive/Perceptual training    Electronically signed by   ISAURO Benavides,  6/8/2022, 9:19 AM

## 2022-06-09 LAB
CULTURE: NORMAL
CULTURE: NORMAL
Lab: NORMAL
Lab: NORMAL
SPECIMEN: NORMAL
SPECIMEN: NORMAL

## 2022-06-09 PROCEDURE — 6370000000 HC RX 637 (ALT 250 FOR IP): Performed by: HOSPITALIST

## 2022-06-09 PROCEDURE — 97116 GAIT TRAINING THERAPY: CPT

## 2022-06-09 PROCEDURE — 6370000000 HC RX 637 (ALT 250 FOR IP): Performed by: PHYSICAL MEDICINE & REHABILITATION

## 2022-06-09 PROCEDURE — 1280000000 HC REHAB R&B

## 2022-06-09 PROCEDURE — 94761 N-INVAS EAR/PLS OXIMETRY MLT: CPT

## 2022-06-09 PROCEDURE — 97530 THERAPEUTIC ACTIVITIES: CPT

## 2022-06-09 PROCEDURE — 99233 SBSQ HOSP IP/OBS HIGH 50: CPT | Performed by: PHYSICAL MEDICINE & REHABILITATION

## 2022-06-09 PROCEDURE — 6360000002 HC RX W HCPCS: Performed by: PHYSICAL MEDICINE & REHABILITATION

## 2022-06-09 PROCEDURE — 97535 SELF CARE MNGMENT TRAINING: CPT

## 2022-06-09 RX ADMIN — HEPARIN SODIUM 5000 UNITS: 5000 INJECTION INTRAVENOUS; SUBCUTANEOUS at 21:53

## 2022-06-09 RX ADMIN — LORAZEPAM 1 MG: 1 TABLET ORAL at 01:27

## 2022-06-09 RX ADMIN — ASPIRIN 81 MG 81 MG: 81 TABLET ORAL at 08:53

## 2022-06-09 RX ADMIN — METOPROLOL TARTRATE 50 MG: 50 TABLET, FILM COATED ORAL at 08:54

## 2022-06-09 RX ADMIN — LEVOTHYROXINE SODIUM 88 MCG: 0.09 TABLET ORAL at 05:45

## 2022-06-09 RX ADMIN — HEPARIN SODIUM 5000 UNITS: 5000 INJECTION INTRAVENOUS; SUBCUTANEOUS at 12:56

## 2022-06-09 RX ADMIN — LORAZEPAM 1 MG: 1 TABLET ORAL at 20:51

## 2022-06-09 RX ADMIN — HEPARIN SODIUM 5000 UNITS: 5000 INJECTION INTRAVENOUS; SUBCUTANEOUS at 05:45

## 2022-06-09 RX ADMIN — SENNOSIDES 8.6 MG: 8.6 TABLET, FILM COATED ORAL at 20:48

## 2022-06-09 RX ADMIN — CLOPIDOGREL BISULFATE 75 MG: 75 TABLET ORAL at 08:54

## 2022-06-09 RX ADMIN — DOCUSATE SODIUM 100 MG: 100 CAPSULE, LIQUID FILLED ORAL at 08:53

## 2022-06-09 RX ADMIN — BUSPIRONE HYDROCHLORIDE 15 MG: 15 TABLET ORAL at 20:48

## 2022-06-09 RX ADMIN — BUSPIRONE HYDROCHLORIDE 15 MG: 15 TABLET ORAL at 08:54

## 2022-06-09 RX ADMIN — AMLODIPINE BESYLATE 10 MG: 10 TABLET ORAL at 08:53

## 2022-06-09 RX ADMIN — SERTRALINE HYDROCHLORIDE 200 MG: 50 TABLET ORAL at 08:54

## 2022-06-09 RX ADMIN — HYDROCODONE BITARTRATE AND ACETAMINOPHEN 1 TABLET: 5; 325 TABLET ORAL at 10:18

## 2022-06-09 RX ADMIN — ATORVASTATIN CALCIUM 40 MG: 40 TABLET, FILM COATED ORAL at 20:48

## 2022-06-09 ASSESSMENT — PAIN SCALES - GENERAL: PAINLEVEL_OUTOF10: 8

## 2022-06-09 NOTE — PROGRESS NOTES
Alon Cage    : 1945  Acct #: [de-identified]  MRN: [id number]              PM&R Progress Note      Admitting diagnosis: Acute CVA ( [place] [address])     Comorbid diagnoses impacting rehabilitation: Generalized weakness, gait disturbance, essential hypertension, acute kidney injury, hyperkalemia, uncontrolled diabetes type 2 with peripheral neuropathy, depression with anxiety, uncontrolled pain (left lower limb), mixed hyperlipidemia, nicotine addiction     Chief complaint: She is gaining confidence with her movements. Denies pain, nausea or chills. Prior (baseline) level of function: Independent. Current level of function:         Current  IRF-CRISTIN and Goals:   Occupational Therapy:    Short Term Goals  Time Frame for Short term goals: STGs=LTGs :   Long Term Goals  Time Frame for Long term goals : 7-10 days or until d/c. Long Term Goal 1: Pt will complete self-feeding c Mod I.  Long Term Goal 2: Pt will complete grooming and oral care tasks c Mod I.  Long Term Goal 3: Pt will complete total body bathing c AE PRN and Mod I.  Long Term Goal 4: Pt will complete UB dressing c Mod I.  Long Term Goal 5: Pt will complete LB dressing c AE PRN and Mod I. Additional Goals?: Yes  Long Term Goal 6: Pt will doff/don footwear c AE PRN and Mod I.  Long Term Goal 7: Pt will perform toileting c Mod I.  Long Term Goal 8: Pt will perform functional transfers (bed, chair, toilet, shower) c DME PRN and Mod I.  Long Term Goal 9: Pt will perform therex/therax to facilitate an increase in strength/endurance/ax tolerance (c emphasis on dynamic standing balance/tolerance > 8 mins, BUE strength, FMC) c Mod I.  Long Term Goal 10:  Pt will complete light home management tasks c Mod I. :                                       Eating: Eating  Assistance Needed: Independent  Comment: X  CARE Score: 6  Discharge Goal: Independent       Oral Hygiene: Oral Hygiene  Assistance Needed: Independent  Comment: Manages and completes denture care MOd I seated sinkside  CARE Score: 6  Discharge Goal: Independent    UB/LB Bathing: Shower/Bathe Self  Assistance Needed: Supervision or touching assistance  Comment: SB/CGA in stance, seated majority of shower  CARE Score: 4  Discharge Goal: Independent    UB Dressing: Upper Body Dressing  Assistance Needed: Setup or clean-up assistance  Comment: Set up  CARE Score: 5  Discharge Goal: Independent         LB Dressing: Lower Body Dressing  Assistance Needed: Supervision or touching assistance  Comment: SB/CGA in stance to manage over hips, threeads B LE into pants using figure four mathod  CARE Score: 4  Discharge Goal: Independent    Donning and Tangent Footwear: Putting On/Taking Off Footwear  Assistance Needed: Setup or clean-up assistance  Comment: Set up  CARE Score: 5  Discharge Goal: Independent      Toiletin [address] needed: Supervision or touching assistance  Comment: Sup in stance to manage over hips  CARE Score: 4  Discharge Goal: Independent      Toilet Transfers: Toilet Transfer  Assistance needed: Supervision or touching assistance  Comment: SBA  CARE Score: 4  Discharge Goal: Independent    Physical Therapy:   Short Term Goals  Time Frame for Short term goals: 10 tx days:  Short term goal 1: Pt will complete sup<->sit Ind. Short term goal 2: Pt will complete OOB transfers Mod Ind-Ind. Short term goal 3: Pt will ambulate 10 ft and 50 ft with turns over level surface using LRAD Mod Ind-Ind. Short term goal 4: Pt will ambulate at least 10 ft of uneven surface and 150 ft over level surface using LRAD with SBA-Sup. Short term goal 5: Pt will ascend/descend curb step using LRAD and 1 flight of stairs using railings with SBA-Sup. Additional Goals?: Yes  Short Term Goal 6: Pt will ascend/descend 4-5 steps using railings Mod Ind. Short Term Goal 7: Pt will complete object retrieval from the floor in standing with 1UE support Mod Ind.             Bed Mobility:   Sit to Lying  Assistance Needed: Setup or clean-up assistance  Comment: Use of rails and bed features  CARE Score: 5  Discharge Goal: Independent  Roll Left and Right  Assistance Needed: Independent  Comment: Ind without use of bed features  CARE Score: 6  Discharge Goal: Independent  Lying to Sitting on Side of Bed  Assistance Needed: Setup or clean-up assistance  Comment: Use of rails and bed features  CARE Score: 5  Discharge Goal: Independent    Transfers:    Sit to Stand  Assistance Needed: Supervision or touching assistance  Comment: SBA with RW  CARE Score: 4  Discharge Goal: Independent  Chair/Bed-to-Chair Transfer  Assistance Needed: Supervision or touching assistance  Comment: SBA without RW  CARE Score: 4  Discharge Goal: Independent     Car Transfer  Assistance Needed: Supervision or touching assistance  Comment: SBA with RW  CARE Score: 4  Discharge Goal: Independent    Ambulation:    Walking Ability  Does the Patient Walk?: Yes     Walk 10 Feet  Assistance Needed: Supervision or touching assistance  Comment: SBA with RW  CARE Score: 4     Walk 50 Feet with Two Turns  Assistance Needed: Supervision or touching assistance  Comment: SBA with RW  CARE Score: 4     Walk 150 Feet  Assistance Needed: Supervision or touching assistance  Comment: SBA with RW  Reason if not Attempted: Not attempted due to medical condition or safety concerns  CARE Score: 4  Discharge Goal: Supervision or touching assistance     Walking 10 Feet on Uneven Surfaces  Assistance Needed: Supervision or touching assistance  Comment: SBA with RW  CARE Score: 4  Discharge Goal: Independent     1 Step (Curb)  Assistance Needed: Supervision or touching assistance  Comment: SBA with RW (4\" height step)  CARE Score: 4  Discharge Goal: Supervision or touching assistance     4 Steps  Assistance Needed: Supervision or touching assistance  Comment: SBA with 2 rails (4\" and 6\" step heights)  CARE Score: 4  Discharge Goal: Independent     12 Expected length of stay  prior to a supervised level of function for discharge home with a walker and Sergio Murillo OT/PT is 6/11/2022. Recommendations:    1. Acute CVA with generalized weakness:   A little more consistent engagement in the daily occupational and physical therapy with speech-language pathology.  Although she needs supervision, she is a little better at self correcting losses of balance. Ongoing adaptive equipment training, aggressive pulmonary hygiene measures and DVT prophylaxis.  Continues to eat better.  Caregiver education planned.  Antiplatelet therapy with Plavix and aspirin for 21 days then withdrawing the Plavix.  Statin therapy.  Verbal cues and CGA needed for transfers today. 2. DVT prophylaxis: Heparin 5000 units every 8 hours.  I must periodically monitor her hemoglobin and platelet count while on this medication.  Weightbearing activities are slowly improving daily.  GI prophylaxis is available.  No signs of acute blood loss. Anticipate stopping this tomorrow should her walking distances continue to improve. 3. Uncontrolled diabetes type 2 with hypoglycemia: Because of poor oral intake she remains a regular diet. Monitoring blood sugars twice daily.  Encouraging consistent oral intake.    4. Acute kidney injury: Avoiding nephrotoxic medications.  Encouraging consistent oral hydration.  Periodic monitoring of her chemistries. 5. Depression with anxiety: Sleep regulation, cautious pain management and BuSpar 15 mg twice daily.  Lorazepam as needed.  Encouraging family involvement with caregiver training. Benefiting from verbal cues to stay on task and treatment. 6. Nicotine addiction: NicoDerm. She anticipates returning to smoking after discharge.   7. Hypertension: Lopressor and Norvasc for blood pressure regulation.  Target systolic blood pressures 439-358.  Vital signs are checked at rest and with activity.  Blood pressure within target range again today.     Counseling and Coordination of Care: In care conference today I met with the patient's OT, PT, RN and . We discussed the patient's problems, progress and prognosis. Disposition issues were clarified and plans were established for ongoing rehabilitation efforts beyond the ARU stay. I reviewed this information with the patient during a second distinct visit with the patient. More than half of the total time of 34 minutes spent with the patient involved counseling and coordination of care.

## 2022-06-09 NOTE — PROGRESS NOTES
Nutrition Note    Patient dislikes oral nutrition supplement, requested supplement not be sent anymore. Encouraged to eat well at meals.  Will discontinue supplement at this  this time, will continue to follow up during stay      Electronically signed by Heidy Paz RD, LD on 6/9/22 at 12:25 PM EDT    Contact: [phone]

## 2022-06-09 NOTE — CARE COORDINATION
Patient reviewed at today's care conference. Patient will dc home with son and PRN family support 6/11. [facility] pt/ot. RW recommended. Case mgt updated patient in room. Patient agreeable to dc date and plan. Family will provide dc transport. Patient provided with list of Medicare participating [facility] in the geographic area of the patient served. Patient selected [facility] and was provided with a comparative data handout from [address] [place] [address] website. The patient (and/or Family) was educated on the quality outcomes for each provider. Patient (and/or Family) demonstrated understanding. Per patient/family request, referral made to [facility]. Patient referred to [facility] via perfect serve    DME orders faxed to Ceci Head at [facility].

## 2022-06-09 NOTE — FLOWSHEET NOTE
[x] daily progress note       [] discharge       Patient Name:  Rhonda Oliveira   :  1945 MRN: [id number]  Room:  [address] [place], [place] [address] Date of Admission: 2022  Rehabilitation Diagnosis:   Acute CVA (cerebrovascular accident) [facility]) [I63.9]       Date 2022       Day of ARU Week:  4   Time IN/-952  [id number]   Individual Tx Minutes 120   TOTAL Tx Time Mins 120   Restrictions Restrictions/Precautions  Restrictions/Precautions: General Precautions,Fall Risk (Hx of anxiety disorder and agoraphobia)       Communication with other providers: [x]   OK to see per nursing:     []   Spoke with team member regarding:      Subjective observations and cognitive status: AM session: Pt seen in semi-luna's position in bed at beginning of treatment. Agreeable to therapy. PM session: pt seen in semi-luna's position in bed at beginning of treatment. Agreeable to therapy. Pain level/location: 6/10       Location: [facility]   Discharge recommendations  Anticipated discharge date: Expected Discharge Date: 22  Destination: []?home alone   []?home alone with assist PRN     [x]? home w/ family      []? Continuous supervision  []? SNF    []? Assisted living     []? Other:   Continued therapy: [x]? [facility] PT  []? OUTPATIENT  PT   []? No Further PT  Equipment needs: Rhonda Oliveira requires the assistance of a wheeled walker to successfully ambulate from room to room at home to allow completion of daily living tasks such as: bathing, toileting, dressing and grooming. A wheeled walker is necessary due to the patient's unsteady gait, upper body weakness, inability to  a standard walker. This patient can ambulate only by pushing a walker instead of using a lesser assistive device such as a cane or crutch.       Bed Mobility:           Scooting: Setup assist with bed rails and bed features   Lying --> Sit:  Setup assist with bed rails and bed features   Sit --> lying: Setup assist with bed rails and bed features     Transfers: Sit--> Stand:  SBA  Stand --> Sit:   SBA  Chair-->Bed/Bed --> Chair:   SBA  Assistive device required for transfer: RW  AM session: focused on repetitive walker safety and sit<->stand transfer with emphasis on proper walker safety and hand placement. Gait:    Distance:  AM session: 392'+10'+10'+10'+10'+10'+10'+10'+10'; PM session: 95'+617'+892'  Assistance: SBA  Device:  RW  Gait Quality:  Reciprocal pattern; Vcs to stay closer to walker. Stairs (PM session)   # Completed: 15  Assistance:  SBA  Supportive Device:  2 rails     Uneven Surfaces:  (PM session) (2 trials)     Assistance:  SBA  Device:   RW  Surfaces Completed:   [x]  Carpeted Surface with bean bags beneath      []  Throw rugs       []  Ramp       []  Outdoor pavements        []  Grass             []  Loose gravel        []  Other:   Vcs to stay closer to walker.       PM session: 2 trials: Pt amb up/down 4\" curb step SBA with RW (vcs for walker safety and positioning)      Additional Therapeutic activities/exercises completed this date:     []   Nu-step:  Time:        Level:         #Steps:       [x]   Rebounder:    []  Seated     [x]  Standing AM session: (2 Ib, 3 Ib, 4 Ib med balls x 20 reps each with SBA for balance with RW)        []   Balance training         []   Postural training    []   Supine ther ex (reps/sets):     []   Seated ther ex (reps/sets):     []   Standing ther ex (reps/sets):     []   Picking up object from floor (standing):                   []   Reacher used   []   Other:   []   Other:    Patient/Caregiver Education and Training:   [x]   Bed Mobility/Transfer technique/safety  [x]   Gait technique/sequencing  [x]   Proper use of assistive device  [x]   Advanced mobility safety and technique  []   Reinforced patient's precautions/mobility while maintaining precautions  []   Postural awareness  []   Family training    Treatment Plan for Next Session: gait; transfers; advanced gait; stair training; exercises     Assessment: This pt demonstrated a positive response to today's treatment as evidenced by improved mobility. The patient is making progress toward established goals as evidenced by QI scores. Ongoing deficits are observed in the areas of strength, balance, endurance, and safety and continued focus on this is recommended. Treatment/Activity Tolerance:   [x] Tolerated treatment with no adverse effects    [] Patient limited by fatigue  [] Patient limited by pain   [] Patient limited by medical complications:    [] Adverse reaction to Tx:   [] Significant change in status    Safety:       [x]  bed alarm set    [x]  chair alarm set    []  Pt refused alarms                []  Telesitter activated      [x]  Gait belt used during tx session      [x]other:  After AM session: pt left sitting up in w/c with call light at end of treatment. After PM session: pt left sitting up on EOB with call light at end of treatment. Number of Minutes/Billable Intervention  Gait Training 90   Therapeutic Exercise    Neuro Re-Ed    Therapeutic Activity 30   Wheelchair Propulsion    Group    Other:    TOTAL 120         Social History  Social/Functional History  Lives With: Son (Son Krunal Mayo)  Type of Home: House  Home Layout: Multi-level,Laundry in basement (Pt lives in a tri-level home. Pt's bedroom and bathroom on top level with 7 stairs with no HR.  Pt reports she never has to go to basement level.)  Home Access: Stairs to enter without rails  Entrance Stairs - Number of Steps: 1  Entrance Stairs - Rails: None  Bathroom Shower/Tub: Tub/Shower unit  Bathroom Toilet: Standard  Bathroom Equipment: Shower chair (Possible shower chair, Pt unsure.)  Bathroom Accessibility: Walker accessible  Home Equipment: Cane,Cane, quad  Has the patient had two or more falls in the past year or any fall with injury in the past year?: Yes (Pt reports 3 falls in last year with no significant injuries.)  Receives Help From: Family (Pt lives with son who can assist SRI and has 2 daughters in the area that can assist if needed.)  ADL Assistance: Independent (Pt completing sponge baths at baseline.)  Homemaking Assistance: Needs assistance  Homemaking Responsibilities: Yes  Meal Prep Responsibility: Primary (Pt uses the microwave and will make sandwiches.)  Laundry Responsibility: No (Son and daughters complete.)  Cleaning Responsibility: Primary  Bill Paying/Finance Responsibility: Primary  Shopping Responsibility: Secondary (Shared responsibility with son.)  Health Care Management: Primary  Ambulation Assistance: Independent  Transfer Assistance: Independent  Active : Yes  Mode of Transportation: Car  Education: HS education  Occupation: Retired  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 Housework. Grocery  Pt manages meds via pill bottles. Finances are completed via auto deposit. IADL Comments: Stormy Hess help from son: Dorita Bourne  Additional Comments: Pt has regular flat bed at home. Pt reports 3 falls in last year with no significant injuries. Pt is R hand dominant. Objective                                                                                    Goals:  (Update in navigator)  Short Term Goals  Time Frame for Short term goals: 10 tx days:  Short term goal 1: Pt will complete sup<->sit Ind. Short term goal 2: Pt will complete OOB transfers Mod Ind-Ind. Short term goal 3: Pt will ambulate 10 ft and 50 ft with turns over level surface using LRAD Mod Ind-Ind. Short term goal 4: Pt will ambulate at least 10 ft of uneven surface and 150 ft over level surface using LRAD with SBA-Sup. Short term goal 5: Pt will ascend/descend curb step using LRAD and 1 flight of stairs using railings with SBA-Sup. Additional Goals?: Yes  Short Term Goal 6: Pt will ascend/descend 4-5 steps using railings Mod Ind.   Short Term Goal 7: Pt will complete object retrieval from the floor in standing with 1UE support Mod Ind.:   :        Plan of Care                                                                              Times per week: 5 days per week for a minimum of 60 minutes/day plus group as appropriate for 60 minutes.   Treatment to include Current Treatment Recommendations: Strengthening,Balance training,Functional mobility training,Transfer training,Endurance training,Gait training,Stair training,Home exercise program,Safety education & training,Patient/Caregiver education & training,Equipment evaluation, education, & procurement,Therapeutic activities    Electronically signed by   Gloira Schneider [id number]  6/9/2022, 8:55 AM

## 2022-06-09 NOTE — PROGRESS NOTES
Physical Rehabilitation: OCCUPATIONAL THERAPY     [x] daily progress note       [] discharge       Patient Name:  Malu Turcios   :  1945 MRN: [id number]  Room:  [address] [place], [place] [address] Date of Admission: 2022  Rehabilitation Diagnosis:   Acute CVA (cerebrovascular accident) [facility]) [I63.9]       Date 2022       Day of ARU Week:  4   Time IN/OUT 1000/1100   Individual Tx Minutes 60   Group Tx Minutes    Co-Treat Minutes    Concurrent Tx Minutes    TOTAL Tx Time Mins 60   Variance Time    Variance Time []   Refusal due to:     []   Medical hold/reason:    []   Illness   []   Off Unit for test/procedure  []   Extra time needed to complete task  []   Therapeutic need  []   Other (specify):   Restrictions Restrictions/Precautions: General Precautions,Fall Risk (Hx of anxiety disorder and agoraphobia)         Communication with other providers: [x]   OK to see per nursing:     []   Spoke with team member regarding:      Subjective observations and cognitive status: Patient up in wc, pleasant and agreeable to therapy    Pain level/location:   6 /10       Location: B knees Mostly R knee    Discharge recommendations  Anticipated discharge date:  TBD  Destination: []home alone   []home alone w assist prn   [x] home w/ family    [] Continuous supervision       []SNF    [] Assisted living     [] Other:   Continued therapy: [x]HHC OT  []OUTPATIENT  OT   [] No Further OT  Equipment needs: Malu Turcios requires the assistance of a tub transfer bench to successfully and safely complete bathing activities necessary due to reduced balance, endurance, need for ADL assist, and LE weakness and reduced ROM. These tasks cannot be safely completed without this device and would place Malu Turcios at greater risk of falls. ADLs:    UB Dressing: Upper Body Dressing  Assistance Needed: Setup or clean-up assistance  Comment: Set up  CARE Score: 5  Discharge Goal: Independent         Toiletin [facility] needed: Supervision or touching assistance  Comment: Sup in stance to manage over hips  CARE Score: 4  Discharge Goal: Independent      Toilet Transfers:     Toilet Transfer  Assistance needed: Supervision or touching assistance  Comment: SBA  CARE Score: 4  Discharge Goal: Independent  Device Used:    []   Standard Toilet         [x]   Grab Bars           [x]  Bedside Commode frame over standard toilet        []   Elevated Toilet          []   Other:        Bed Mobility:           [x]   Pt received out of bed   Rolling R/L:    Scooting:    Supine --> Sit:    Sit --> Supine:      Transfers:    Sit--> Stand:  Sup/SB  Stand --> Sit:   Sup  Stand-Pivot:   SBA  Other:  Requires cues fro proper hand foot and body placement within RW   Assistive device required for transfer:   Sba  Patient instructed in safe RW use with TTB transfer training  Trials times 3 with SBA      Functional Mobility:  Throughout room/bathroom; throughout Transfer training tasks   Assistance:  SBA cues fro body placement within RW   Device:   [x]   Rolling Walker     []   Standard Walker []   Wheelchair        []   [employer]       []   4-Wheeled Neville Harvard         []   Cardiac Neville Harvard       []   Other:          Additional Therapeutic activities/exercises completed this date:     [x]   ADL Training   [x]   Balance/Postural training     [x]   Bed/Transfer Training patient instructed in transfer training with patient completing mobility tasks to and from different surfaces of varying heights to continue RW safety following PT therapy session, patient shows goof demo and return for [employer] safety and completes with Sup/SBA    []   Endurance Training   []   Neuromuscular Re-ed   []   Nu-step:  Time:        Level:         #Steps:       []   Rebounder:    []  Seated     []  Standing        []   Supine Ther Ex (reps/sets):     []   Seated Ther Ex (reps/sets):     []   Standing Ther Ex (reps/sets):     []   Other:      Comments:      Patient/Caregiver Education and Training:   [x]   Adaptive Equipment Use  [x]   Bed Mobility/Transfer Technique/Safety  [x]   Energy Conservation Tips  []   Family training  [x]   Postural Awareness  [x]   Safety During Functional Activities  []   Reinforced Patient's Precautions   []   Progress was updated and reviewed in Rehabtracker with patient and/or family this         date. Treatment Plan for Next Session: POC to continue as tolerated           Treatment/Activity Tolerance:   [x] Tolerated treatment with no adverse effects    [] Patient limited by fatigue  [] Patient limited by pain   [] Patient limited by medical complications:    [] Adverse reaction to Tx:   [] Significant change in status    Safety:       []  bed alarm set    [x]  chair alarm set    []  Pt refused alarms                []  Telesitter activated      [x]  Gait belt used during tx session      []other:       Number of Minutes/Billable Intervention  Therapeutic Exercise    ADL Self-care 30   Neuro Re-Ed    Therapeutic Activity 30   Group    Other:    TOTAL 60       Social History  Social/Functional History  Lives With: Son (Mookie Mayo)  Type of Home: House  Home Layout: Multi-level,Laundry in basement (Pt lives in a tri-level home. Pt's bedroom and bathroom on top level with 7 stairs with no HR.  Pt reports she never has to go to basement level.)  Home Access: Stairs to enter without rails  Entrance Stairs - Number of Steps: 1  Entrance Stairs - Rails: None  Bathroom Shower/Tub: Tub/Shower unit  Bathroom Toilet: Standard  Bathroom Equipment: Shower chair (Possible shower chair, Pt unsure.)  Bathroom Accessibility: Walker accessible  Home Equipment: Cane,Cane, quad  Has the patient had two or more falls in the past year or any fall with injury in the past year?: Yes (Pt reports 3 falls in last year with no significant injuries.)  Receives Help From: Family (Pt lives with son who can assist PRN and has 2 daughters in the area that can assist if needed.)  ADL Assistance: Independent (Pt completing sponge baths at baseline.)  Homemaking Assistance: Needs assistance  Homemaking Responsibilities: Yes  Meal Prep Responsibility: Primary (Pt uses the microwave and will make sandwiches.)  Laundry Responsibility: No (Son and daughters complete.)  Cleaning Responsibility: Primary  Bill Paying/Finance Responsibility: Primary  Shopping Responsibility: Secondary (Shared responsibility with son.)  Health Care Management: Primary  Ambulation Assistance: Independent  Transfer Assistance: Independent  Active : Yes  Mode of Transportation: Car  Education: HS education  Occupation: Retired  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 Housework. Grocery  Pt manages meds via pill bottles. Finances are completed via auto deposit. IADL Comments: Newt Folds help from son: Tiffany Lundberg  Additional Comments: Pt has regular flat bed at home. Pt reports 3 falls in last year with no significant injuries. Pt is R hand dominant. Objective                                                                                    Goals:  (Update in navigator)  Short Term Goals  Time Frame for Short term goals: STGs=LTGs:  Long Term Goals  Time Frame for Long term goals : 7-10 days or until d/c. Long Term Goal 1: Pt will complete self-feeding c Mod I.  Long Term Goal 2: Pt will complete grooming and oral care tasks c Mod I.  Long Term Goal 3: Pt will complete total body bathing c AE PRN and Mod I.  Long Term Goal 4: Pt will complete UB dressing c Mod I.  Long Term Goal 5: Pt will complete LB dressing c AE PRN and Mod I.   Additional Goals?: Yes  Long Term Goal 6: Pt will doff/don footwear c AE PRN and Mod I.  Long Term Goal 7: Pt will perform toileting c Mod I.  Long Term Goal 8: Pt will perform functional transfers (bed, chair, toilet, shower) c DME PRN and Mod I.  Long Term Goal 9: Pt will perform therex/therax to facilitate an increase in strength/endurance/ax tolerance (c emphasis on dynamic standing balance/tolerance > 8 mins, BUE strength, FMC) c Mod I.  Long Term Goal 10: Pt will complete light home management tasks c Mod I.:        Plan of Care                                                                              Times per week: 5 days per week for a minimum of 60 minutes/day plus group as appropriate for 60 minutes.   Treatment to include Plan  Times per Day: Daily  Current Treatment Recommendations: Strengthening,Balance training,Functional mobility training,Endurance training,Safety education & training,Patient/Caregiver education & training,Equipment evaluation, education, & procurement,Self-Care / ADL,Coordination training,Neuromuscular re-education,Home management training,Cognitive/Perceptual training    Electronically signed by   ISAURO Rosario,  6/9/2022, 11:23 AM

## 2022-06-10 ENCOUNTER — TELEPHONE (OUTPATIENT)
Dept: INTERNAL MEDICINE CLINIC | Age: 77
End: 2022-06-10

## 2022-06-10 LAB
GLUCOSE BLD-MCNC: 141 MG/DL (ref 70–99)
GLUCOSE BLD-MCNC: 154 MG/DL (ref 70–99)
GLUCOSE BLD-MCNC: 167 MG/DL (ref 70–99)

## 2022-06-10 PROCEDURE — 97530 THERAPEUTIC ACTIVITIES: CPT

## 2022-06-10 PROCEDURE — 6370000000 HC RX 637 (ALT 250 FOR IP): Performed by: HOSPITALIST

## 2022-06-10 PROCEDURE — 97110 THERAPEUTIC EXERCISES: CPT

## 2022-06-10 PROCEDURE — 1280000000 HC REHAB R&B

## 2022-06-10 PROCEDURE — 6370000000 HC RX 637 (ALT 250 FOR IP): Performed by: PHYSICAL MEDICINE & REHABILITATION

## 2022-06-10 PROCEDURE — 82962 GLUCOSE BLOOD TEST: CPT

## 2022-06-10 PROCEDURE — 6360000002 HC RX W HCPCS: Performed by: PHYSICAL MEDICINE & REHABILITATION

## 2022-06-10 PROCEDURE — 94761 N-INVAS EAR/PLS OXIMETRY MLT: CPT

## 2022-06-10 PROCEDURE — 97535 SELF CARE MNGMENT TRAINING: CPT

## 2022-06-10 PROCEDURE — 97116 GAIT TRAINING THERAPY: CPT

## 2022-06-10 PROCEDURE — 99232 SBSQ HOSP IP/OBS MODERATE 35: CPT | Performed by: PHYSICAL MEDICINE & REHABILITATION

## 2022-06-10 RX ORDER — SENNA PLUS 8.6 MG/1
1 TABLET ORAL NIGHTLY
Qty: 30 TABLET | Refills: 0 | COMMUNITY
Start: 2022-06-10 | End: 2022-07-08

## 2022-06-10 RX ORDER — ASPIRIN 81 MG/1
81 TABLET, CHEWABLE ORAL DAILY
Qty: 30 TABLET | Refills: 0 | COMMUNITY
Start: 2022-06-11

## 2022-06-10 RX ORDER — BUSPIRONE HYDROCHLORIDE 15 MG/1
15 TABLET ORAL 2 TIMES DAILY
Qty: 1 TABLET | Refills: 0
Start: 2022-06-10 | End: 2022-09-01

## 2022-06-10 RX ORDER — CLOPIDOGREL BISULFATE 75 MG/1
75 TABLET ORAL DAILY
Qty: 10 TABLET | Refills: 3 | Status: SHIPPED | OUTPATIENT
Start: 2022-06-11 | End: 2022-08-04 | Stop reason: SDUPTHER

## 2022-06-10 RX ORDER — DOCUSATE SODIUM 100 MG/1
100 CAPSULE, LIQUID FILLED ORAL DAILY
COMMUNITY
Start: 2022-06-11 | End: 2022-09-01

## 2022-06-10 RX ADMIN — SERTRALINE HYDROCHLORIDE 200 MG: 50 TABLET ORAL at 07:59

## 2022-06-10 RX ADMIN — CLOPIDOGREL BISULFATE 75 MG: 75 TABLET ORAL at 07:52

## 2022-06-10 RX ADMIN — BUSPIRONE HYDROCHLORIDE 15 MG: 15 TABLET ORAL at 07:53

## 2022-06-10 RX ADMIN — HEPARIN SODIUM 5000 UNITS: 5000 INJECTION INTRAVENOUS; SUBCUTANEOUS at 14:32

## 2022-06-10 RX ADMIN — ATORVASTATIN CALCIUM 40 MG: 40 TABLET, FILM COATED ORAL at 20:14

## 2022-06-10 RX ADMIN — METOPROLOL TARTRATE 25 MG: 25 TABLET, FILM COATED ORAL at 20:14

## 2022-06-10 RX ADMIN — DOCUSATE SODIUM 100 MG: 100 CAPSULE, LIQUID FILLED ORAL at 07:53

## 2022-06-10 RX ADMIN — LEVOTHYROXINE SODIUM 88 MCG: 0.09 TABLET ORAL at 05:39

## 2022-06-10 RX ADMIN — HEPARIN SODIUM 5000 UNITS: 5000 INJECTION INTRAVENOUS; SUBCUTANEOUS at 05:39

## 2022-06-10 RX ADMIN — LORAZEPAM 1 MG: 1 TABLET ORAL at 20:15

## 2022-06-10 RX ADMIN — ASPIRIN 81 MG 81 MG: 81 TABLET ORAL at 07:53

## 2022-06-10 RX ADMIN — ACETAMINOPHEN 650 MG: 325 TABLET ORAL at 05:39

## 2022-06-10 RX ADMIN — AMLODIPINE BESYLATE 10 MG: 10 TABLET ORAL at 07:53

## 2022-06-10 RX ADMIN — BUSPIRONE HYDROCHLORIDE 15 MG: 15 TABLET ORAL at 20:15

## 2022-06-10 ASSESSMENT — PAIN SCALES - GENERAL
PAINLEVEL_OUTOF10: 0
PAINLEVEL_OUTOF10: 0

## 2022-06-10 NOTE — FLOWSHEET NOTE
[x] daily progress note       [x] discharge       Patient Name:  Minh Palma   :  1945 MRN: [id number]  Room:  [address] [place], [place] [address] Date of Admission: 2022  Rehabilitation Diagnosis:   Acute CVA (cerebrovascular accident) [facility]) [I63.9]       Date 6/10/2022       Day of ARU Week:  5   Time IN/-1000  0441-1057   Individual Tx Minutes 120   TOTAL Tx Time Mins 120   Restrictions Restrictions/Precautions  Restrictions/Precautions: General Precautions,Fall Risk (Hx of anxiety disorder and agoraphobia)      Communication with other providers: [x]   OK to see per nursing:     []   Spoke with team member regarding:      Subjective observations and cognitive status: AM session: pt seen in semi-luna's position in bed at beginning of treatment. Agreeable to therapy. PM session: pt seen seated in recliner at beginning of treatment. Agreeable to therapy. Pain level/location: 4/10       Location: right knee     Discharge recommendations  Anticipated discharge date: Expected Discharge Date: 22  Destination: []??home alone   []? ?home alone with assist PRN     [x]? ? home w/ family      []? ? Continuous supervision  []? ?SNF    []? ? Assisted living     []? ? Other:   Continued therapy: [x]? ?Sergio Murillo PT  []??OUTPATIENT  PT   []? ? No Further PT  Equipment needs: Mckenna Selby requires the assistance of a wheeled walker to successfully ambulate from room to room at home to allow completion of daily living tasks such as: bathing, toileting, dressing and grooming. A wheeled walker is necessary due to the patient's unsteady gait, upper body weakness, inability to  a standard walker. This patient can ambulate only by pushing a walker instead of using a lesser assistive device such as a cane or crutch.       Bed Mobility:             Rolling R/L: Independent   Scooting:  Independent   Lying --> Sit:  Independent   Sit --> lying:  Independent  Pt practiced in regular, flat bed without rails     Transfers:    Sit--> Stand: Mod I   Stand --> Sit:   Mod I   Chair-->Bed/Bed --> Chair:   Mod I   Car Transfers: Mod I   Assistive device required for transfer:  RW    Gait:    Walk 10 feet: Mod I   Walk 50 feet with 2 turns: Mod I   Walk 150 feet: Mod I   Other distance: PM session: 220'   Device: RW  Gait Quality:  Reciprocal pattern     Stairs   Curb: Supervision   Supportive Device:  RW  Height:   4\"   Vcs for walker safety and proper positioning. 4 steps: Mod I   12 steps: Mod I   Supportive Device:  2 rails     Uneven Surfaces:       Assistance: Mod I   Device:    RW  Surfaces Completed:   [x]  Carpeted Surface with bean bags beneath      []  Throw rugs       []  Ramp       []  Outdoor pavements        []  Grass             []  Loose gravel        []  Other:       Picking Up Object From Floor (must be standing position):  Assistance: Mod I with RW   [x]   Reacher used    Additional Therapeutic activities/exercises completed this date:     [x]   Nu-step: PM session: Time: 15 minutes       Level:  1     (for strengthening and endurance training)      []   Rebounder:    []  Seated     []  Standing        []   Balance training         []   Postural training    []   Supine ther ex (reps/sets):     []   Seated ther ex (reps/sets):     [x]   Standing ther ex (reps/sets): PM session: marching, hip extension, hip abduction, heel raises, toe raises, mini-squats, knee flexion x 10 reps each for strengthening. []   Other:   []   Other:   []   Other:    Patient/Caregiver Education and Training:   [x]   Bed Mobility/Transfer technique/safety  [x]   Gait technique/sequencing  [x]   Proper use of assistive device  [x]   Advanced mobility safety and technique  []   Reinforced patient's precautions/mobility while maintaining precautions  []   Postural awareness  []   Family training    Treatment Plan for Next Session: pt to discharge tomorrow (6-) from [facility].      Assessment:    Treatment/Activity Tolerance:   [x] Tolerated treatment with no adverse effects    [] Patient limited by fatigue  [] Patient limited by pain   [] Patient limited by medical complications:    [] Adverse reaction to Tx:   [] Significant change in status    Safety:       [x]  bed alarm set    []  chair alarm set    []  Pt refused alarms                []  Telesitter activated      [x]  Gait belt used during tx session      [x]other: After AM session: pt left in semi-luna's position in bed with call light at end of treatment. After PM session: pt left in semi-luna's position in bed with call light at end of treatment. Number of Minutes/Billable Intervention  Gait Training 75   Therapeutic Exercise 30   Neuro Re-Ed    Therapeutic Activity 15   Wheelchair Propulsion    Group    Other:    TOTAL 120         Social History  Social/Functional History  Lives With: Son (Son - Gael)  Type of Home: House  Home Layout: Multi-level,Laundry in basement (Pt lives in a tri-level home. Pt's bedroom and bathroom on top level with 7 stairs with no HR.  Pt reports she never has to go to basement level.)  Home Access: Stairs to enter without rails  Entrance Stairs - Number of Steps: 1  Entrance Stairs - Rails: None  Bathroom Shower/Tub: Tub/Shower unit  Bathroom Toilet: Standard  Bathroom Equipment: Shower chair (Possible shower chair, Pt unsure.)  Bathroom Accessibility: Walker accessible  Home Equipment: Cane,Cane, quad  Has the patient had two or more falls in the past year or any fall with injury in the past year?: Yes (Pt reports 3 falls in last year with no significant injuries.)  Receives Help From: Family (Pt lives with son who can assist PRN and has 2 daughters in the area that can assist if needed.)  ADL Assistance: Independent (Pt completing sponge baths at baseline.)  Homemaking Assistance: Needs assistance  Homemaking Responsibilities: Yes  Meal Prep Responsibility: Primary (Pt uses the microwave and will make sandwiches.)  Laundry Responsibility: No (Son and daughters complete.)  Cleaning Responsibility: Primary  Bill Paying/Finance Responsibility: Primary  Shopping Responsibility: Secondary (Shared responsibility with son.)  Health Care Management: Primary  Ambulation Assistance: Independent  Transfer Assistance: Independent  Active : Yes  Mode of Transportation: Car  Education: HS education  Occupation: Retired  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 Housework. Grocery  Pt manages meds via pill bottles. Finances are completed via auto deposit. IADL Comments: Melissa Reyes help from son: Alecia Aguiar  Additional Comments: Pt has regular flat bed at home. Pt reports 3 falls in last year with no significant injuries. Pt is R hand dominant. Objective                                                                                    Goals:  (Update in navigator)  Short Term Goals  Time Frame for Short term goals: 10 tx days:  Short term goal 1: Pt will complete sup<->sit Ind. Short term goal 2: Pt will complete OOB transfers Mod Ind-Ind. Short term goal 3: Pt will ambulate 10 ft and 50 ft with turns over level surface using LRAD Mod Ind-Ind. Short term goal 4: Pt will ambulate at least 10 ft of uneven surface and 150 ft over level surface using LRAD with SBA-Sup. Short term goal 5: Pt will ascend/descend curb step using LRAD and 1 flight of stairs using railings with SBA-Sup. Additional Goals?: Yes  Short Term Goal 6: Pt will ascend/descend 4-5 steps using railings Mod Ind. Short Term Goal 7: Pt will complete object retrieval from the floor in standing with 1UE support Mod Ind.:   :        Plan of Care                                                                              Times per week: 5 days per week for a minimum of 60 minutes/day plus group as appropriate for 60 minutes.   Treatment to include Current Treatment Recommendations: Strengthening,Balance training,Functional mobility training,Transfer training,Endurance training,Gait training,Stair training,Home exercise program,Safety education & training,Patient/Caregiver education & training,Equipment evaluation, education, & procurement,Therapeutic activities    Electronically signed by   VALERIA [id number]  6/10/2022, 9:02 AM

## 2022-06-10 NOTE — CARE COORDINATION
[id number] Kathe Christensen Liaison spoke with pt and pt is agreeable to [facility] at discharge. Pt is discharging tomorrow 6/11. Pt stated her son will help with her wound near her left groin.

## 2022-06-10 NOTE — PLAN OF CARE
Problem: Discharge Planning  Goal: Discharge to home or other facility with appropriate resources  6/10/2022 1110 by Jessica Cintron RN  Outcome: Progressing  6/9/2022 2236 by Miriam Pierce  Outcome: Progressing     Problem: ABCDS Injury Assessment  Goal: Absence of physical injury  6/10/2022 1110 by Jessica Cintron RN  Outcome: Progressing  6/9/2022 2236 by Miriam Pierce  Outcome: Progressing     Problem: Safety - Adult  Goal: Free from fall injury  6/10/2022 1110 by Jessica Cintron RN  Outcome: Progressing  6/9/2022 2236 by Miriam Pierce  Outcome: Progressing     Problem: Skin/Tissue Integrity  Goal: Absence of new skin breakdown  Description: 1. Monitor for areas of redness and/or skin breakdown  2. Assess vascular access sites hourly  3. Every 4-6 hours minimum:  Change oxygen saturation probe site  4. Every 4-6 hours:  If on nasal continuous positive airway pressure, respiratory therapy assess nares and determine need for appliance change or resting period.   6/10/2022 1110 by Jessica Cintron RN  Outcome: Progressing  6/9/2022 2236 by Miriam Pierce  Outcome: Progressing     Problem: Pain  Goal: Verbalizes/displays adequate comfort level or baseline comfort level  6/10/2022 1110 by Jessica Cintron RN  Outcome: Progressing  6/9/2022 2236 by Miriam Pierce  Outcome: Progressing     Problem: Chronic Conditions and Co-morbidities  Goal: Patient's chronic conditions and co-morbidity symptoms are monitored and maintained or improved  6/10/2022 1110 by Jessica Cintron RN  Outcome: Progressing  6/9/2022 2236 by Miriam Pierce  Outcome: Progressing     Problem: Nutrition Deficit:  Goal: Optimize nutritional status  6/10/2022 1110 by Jessica Cintron RN  Outcome: Progressing  6/9/2022 2236 by Miriam Pierce  Outcome: Progressing

## 2022-06-10 NOTE — CARE COORDINATION
Case mgt received call from patient's dtr. Patient reported to her yesterday that dc is Saturday 6/11 but couldn't remember the details. Case mgt reviewed dc date and plan. Dtr reports patient's home as \"hoarders buried [employer]". She and her spouse have a dumpster to clean up the house today. They're also installing handrails, fixing the electric & plumbing. They will provide dc transport when the patient calls tomorrow. They may need a late afternoon dc to finish their cleaning.

## 2022-06-10 NOTE — PROGRESS NOTES
Physical Rehabilitation: OCCUPATIONAL THERAPY     [x] daily progress note       [] discharge       Patient Name:  Becky Quintero   :  1945 MRN: [id number]  Room:  [address] [place], [place] [address] Date of Admission: 2022  Rehabilitation Diagnosis:   Acute CVA (cerebrovascular accident) [facility]) [I63.9]       Date 6/10/2022       Day of ARU Week:  5   Time IN/OUT 1130/1215; 1240/1255   Individual Tx Minutes 39 + 15   Group Tx Minutes    Co-Treat Minutes    Concurrent Tx Minutes    TOTAL Tx Time Mins 60   Variance Time    Variance Time []   Refusal due to:     []   Medical hold/reason:    []   Illness   []   Off Unit for test/procedure  []   Extra time needed to complete task  []   Therapeutic need  []   Other (specify):   Restrictions Restrictions/Precautions: General Precautions,Fall Risk (Hx of anxiety disorder and agoraphobia)         Communication with other providers: [x]   OK to see per nursing:     []   Spoke with team member regarding:      Subjective observations and cognitive status: Patient in bed sleeping upon approach, easily awakened and pleasant and agreeable to therapy      Pain level/location:    /10       Location: per patient no pain noted    Discharge recommendations  Anticipated discharge date:    Destination: []home alone   []home alone w assist prn   [x] home w/ family    [] Continuous supervision       []SNF    [] Assisted living     [] Other:   Continued therapy: [x]HHC OT  []OUTPATIENT  OT   [] No Further OT  Equipment needs: Jeronimo Peralta the assistance of a tub transfer bench to successfully and safely complete bathing activities necessary due to reduced balance, endurance, need for ADL assist, and LE weakness and reduced ROM.  These tasks cannot be safely completed without this device and would place Mckenna Selby at greater risk of falls.        ADLs:    Eating: Eating  Assistance Needed: Independent  Comment: X  CARE Score: 6  Discharge Goal: Independent       Oral Hygiene: Oral Hygiene  Assistance Needed: Independent  Comment: X  CARE Score: 6  Discharge Goal: Independent    UB/LB Bathing: Shower/Bathe Self  Assistance Needed: Independent  Comment: X  CARE Score: 6  Discharge Goal: Independent    UB Dressing: Upper Body Dressing  Assistance Needed: Independent  Comment: X  CARE Score: 6  Discharge Goal: Independent         LB Dressing: Lower Body Dressing  Assistance Needed: Independent  Comment: X  CARE Score: 6  Discharge Goal: Independent    Donning and [place] Footwear: Putting On/Taking Off Footwear  Assistance Needed: Independent  Comment: X  CARE Score: 6  Discharge Goal: Independent      Toiletin [address] needed: Independent  Comment: X  CARE Score: 6  Discharge Goal: Independent    PM SESSION; Mod I with RW     Toilet Transfers: Toilet Transfer  Assistance needed: Independent  Comment: X  CARE Score: 6  Discharge Goal: Independent  Device Used:    []   Standard Toilet         []   Grab Bars           []  Bedside Commode       []   Elevated Toilet          []   Other:    PM SESSION; Mod I with RW       Bed Mobility:           [x]   Pt received out of bed   Rolling R/L:    Scooting:    Supine --> Sit:    Sit --> Supine:      Transfers:    Sit--> Stand: Mod I   Stand --> Sit:  Mod I    Stand-Pivot: Mod I    Other:    Assistive device required for transfer:  RW        Functional Mobility:  Throughout room/bathroom   Assistance:   Mod I   Device:   [x]   Rolling Walker     []   Standard Walker []   Wheelchair        []   Kalvin Leventhal       []   4-Wheeled Torrie Purdy         []   Cardiac Torrie Purdy       []   Other:        Homemaking Tasks:   Patient retrieves clothing from closet behind recliner and transports to shower  At [employer] level       Additional Therapeutic activities/exercises completed this date:     [x]   ADL Training   [x]   Balance/Postural training     [x]   Bed/Transfer Training   [x]   Endurance Training   []   Neuromuscular Re-ed   []   Nu-step:  Time: Level:         #Steps:       []   Rebounder:    []  Seated     []  Standing        []   Supine Ther Ex (reps/sets):     [x]   Seated Ther Ex (reps/sets):     []   Standing Ther Ex (reps/sets):     [x]   Other: HEP Given to patient with purple theraband gone through Demo and return 100% carryover and RW safety sheet to reduce fall risk upon dc to home, home barrier safety sheet and energy conservation techniques        Comments:      Patient/Caregiver Education and Training:   [x]   Adaptive Equipment Use  [x]   Bed Mobility/Transfer Technique/Safety  [x]   Energy Conservation Tips  [x]   Family training  []   Postural Awareness  [x]   Safety During Functional Activities  []   Reinforced Patient's Precautions   []   Progress was updated and reviewed in Rehabtracker with patient and/or family this         date. Treatment Plan for Next Session: POC to continue as tolerated    Treatment/Activity Tolerance:   [x] Tolerated treatment with no adverse effects    [] Patient limited by fatigue  [] Patient limited by pain   [] Patient limited by medical complications:    [] Adverse reaction to Tx:   [] Significant change in status    Safety:       []  bed alarm set    [x]  chair alarm set    []  Pt refused alarms                []  Telesitter activated      [x]  Gait belt used during tx session      []other:       Number of Minutes/Billable Intervention  Therapeutic Exercise 15   ADL Self-care 45   Neuro Re-Ed    Therapeutic Activity    Group    Other:    TOTAL 60       Social History  Social/Functional History  Lives With: Son (Mookie Mayo)  Type of Home: House  Home Layout: Multi-level,Laundry in basement (Pt lives in a tri-level home. Pt's bedroom and bathroom on top level with 7 stairs with no HR.  Pt reports she never has to go to basement level.)  Home Access: Stairs to enter without rails  Entrance Stairs - Number of Steps: 1  Entrance Stairs - Rails: None  Bathroom Shower/Tub: Tub/Shower unit  Bathroom Toilet: Standard  Bathroom Equipment: Shower chair (Possible shower chair, Pt unsure.)  Bathroom Accessibility: Walker accessible  Home Equipment: Cane,Cane, quad  Has the patient had two or more falls in the past year or any fall with injury in the past year?: Yes (Pt reports 3 falls in last year with no significant injuries.)  Receives Help From: Family (Pt lives with son who can assist PRN and has 2 daughters in the area that can assist if needed.)  ADL Assistance: Independent (Pt completing sponge baths at baseline.)  Homemaking Assistance: Needs assistance  Homemaking Responsibilities: Yes  Meal Prep Responsibility: Primary (Pt uses the microwave and will make sandwiches.)  Laundry Responsibility: No (Son and daughters complete.)  Cleaning Responsibility: Primary  Bill Paying/Finance Responsibility: Primary  Shopping Responsibility: Secondary (Shared responsibility with son.)  Health Care Management: Primary  Ambulation Assistance: Independent  Transfer Assistance: Independent  Active : Yes  Mode of Transportation: Car  Education: HS education  Occupation: Retired  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 Housework. Grocery  Pt manages meds via pill bottles. Finances are completed via auto deposit. IADL Comments: Melissa Reyes help from son: Alecia Aguiar  Additional Comments: Pt has regular flat bed at home. Pt reports 3 falls in last year with no significant injuries. Pt is R hand dominant. Objective                                                                                    Goals:  (Update in navigator)  Short Term Goals  Time Frame for Short term goals: STGs=LTGs:  Long Term Goals  Time Frame for Long term goals : 7-10 days or until d/c.   Long Term Goal 1: Pt will complete self-feeding c Mod I.  Long Term Goal 2: Pt will complete grooming and oral care tasks c Mod I.  Long Term Goal 3: Pt will complete total body bathing c AE PRN and Mod I.  Long Term Goal 4: Pt will complete UB dressing c Mod I.  Long Term Goal 5: Pt will complete LB dressing c AE PRN and Mod I. Additional Goals?: Yes  Long Term Goal 6: Pt will doff/don footwear c AE PRN and Mod I.  Long Term Goal 7: Pt will perform toileting c Mod I.  Long Term Goal 8: Pt will perform functional transfers (bed, chair, toilet, shower) c DME PRN and Mod I.  Long Term Goal 9: Pt will perform therex/therax to facilitate an increase in strength/endurance/ax tolerance (c emphasis on dynamic standing balance/tolerance > 8 mins, BUE strength, FMC) c Mod I.  Long Term Goal 10: Pt will complete light home management tasks c Mod I.:        Plan of Care                                                                              Times per week: 5 days per week for a minimum of 60 minutes/day plus group as appropriate for 60 minutes.   Treatment to include Plan  Times per Day: Daily  Current Treatment Recommendations: Strengthening,Balance training,Functional mobility training,Endurance training,Safety education & training,Patient/Caregiver education & training,Equipment evaluation, education, & procurement,Self-Care / ADL,Coordination training,Neuromuscular re-education,Home management training,Cognitive/Perceptual training    Electronically signed by   ISAURO Ontiveros,  6/10/2022, 12:46 PM

## 2022-06-10 NOTE — TELEPHONE ENCOUNTER
Called [facility] to give verbal orders. Spoke to Shaheen Loud and she stated that she had no information on the patient and was not sure who called and why. States she will keep a note on her in case she sees her name come up.

## 2022-06-10 NOTE — TELEPHONE ENCOUNTER
[place] needs verbal orders for this patient to get Nursing/PT Care for stroke.  [phone]

## 2022-06-11 VITALS
TEMPERATURE: 97.7 F | DIASTOLIC BLOOD PRESSURE: 85 MMHG | OXYGEN SATURATION: 97 % | BODY MASS INDEX: 32.32 KG/M2 | HEART RATE: 55 BPM | RESPIRATION RATE: 16 BRPM | SYSTOLIC BLOOD PRESSURE: 110 MMHG | HEIGHT: 65 IN | WEIGHT: 194 LBS

## 2022-06-11 LAB
GLUCOSE BLD-MCNC: 164 MG/DL (ref 70–99)
GLUCOSE BLD-MCNC: 165 MG/DL (ref 70–99)

## 2022-06-11 PROCEDURE — 6370000000 HC RX 637 (ALT 250 FOR IP): Performed by: HOSPITALIST

## 2022-06-11 PROCEDURE — 6370000000 HC RX 637 (ALT 250 FOR IP): Performed by: PHYSICAL MEDICINE & REHABILITATION

## 2022-06-11 PROCEDURE — 82962 GLUCOSE BLOOD TEST: CPT

## 2022-06-11 PROCEDURE — 94761 N-INVAS EAR/PLS OXIMETRY MLT: CPT

## 2022-06-11 PROCEDURE — 99239 HOSP IP/OBS DSCHRG MGMT >30: CPT | Performed by: PHYSICAL MEDICINE & REHABILITATION

## 2022-06-11 RX ADMIN — METOPROLOL TARTRATE 25 MG: 25 TABLET, FILM COATED ORAL at 10:50

## 2022-06-11 RX ADMIN — LEVOTHYROXINE SODIUM 88 MCG: 0.09 TABLET ORAL at 05:40

## 2022-06-11 RX ADMIN — DOCUSATE SODIUM 100 MG: 100 CAPSULE, LIQUID FILLED ORAL at 10:50

## 2022-06-11 RX ADMIN — CLOPIDOGREL BISULFATE 75 MG: 75 TABLET ORAL at 10:50

## 2022-06-11 RX ADMIN — ASPIRIN 81 MG 81 MG: 81 TABLET ORAL at 10:50

## 2022-06-11 RX ADMIN — SERTRALINE HYDROCHLORIDE 200 MG: 50 TABLET ORAL at 10:50

## 2022-06-11 RX ADMIN — BUSPIRONE HYDROCHLORIDE 15 MG: 15 TABLET ORAL at 10:50

## 2022-06-11 RX ADMIN — ACETAMINOPHEN 650 MG: 325 TABLET ORAL at 05:40

## 2022-06-11 RX ADMIN — AMLODIPINE BESYLATE 10 MG: 10 TABLET ORAL at 10:50

## 2022-06-11 ASSESSMENT — PAIN SCALES - GENERAL
PAINLEVEL_OUTOF10: 0

## 2022-06-11 NOTE — PROGRESS NOTES
Izzy Munoz    : 1945  Acct #: [de-identified]  MRN: [id number]              PM&R Progress Note      Admitting diagnosis: Acute CVA ( 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 Prior (baseline) level of function: Independent. Current level of function:         Current  IRF-CRISTIN and Goals:   Occupational Therapy:    Short Term Goals  Time Frame for Short term goals: STGs=LTGs :   Long Term Goals  Time Frame for Long term goals : 7-10 days or until d/c. Long Term Goal 1: Pt will complete self-feeding c Mod I.  Long Term Goal 2: Pt will complete grooming and oral care tasks c Mod I.  Long Term Goal 3: Pt will complete total body bathing c AE PRN and Mod I.  Long Term Goal 4: Pt will complete UB dressing c Mod I.  Long Term Goal 5: Pt will complete LB dressing c AE PRN and Mod I. Additional Goals?: Yes  Long Term Goal 6: Pt will doff/don footwear c AE PRN and Mod I.  Long Term Goal 7: Pt will perform toileting c Mod I.  Long Term Goal 8: Pt will perform functional transfers (bed, chair, toilet, shower) c DME PRN and Mod I.  Long Term Goal 9: Pt will perform therex/therax to facilitate an increase in strength/endurance/ax tolerance (c emphasis on dynamic standing balance/tolerance > 8 mins, BUE strength, FMC) c Mod I.  Long Term Goal 10:  Pt will complete light home management tasks c Mod I. :                                       Eating: Eating  Assistance Needed: Independent  Comment: X  CARE Score: 6  Discharge Goal: Independent       Oral Hygiene: Oral Hygiene  Assistance Needed: Independent  Comment: X  CARE Score: 6  Discharge Goal: Independent    UB/LB Bathing: Shower/Bathe Self  Assistance Needed: Independent  Comment: X  CARE Score: 6  Discharge Goal: Independent    UB Dressing: Upper Body Dressing  Assistance Needed: Independent  Comment: X  CARE Score: 6  Discharge Goal: Independent         LB Dressing: Lower Body Dressing  Assistance Needed: Independent  Comment: X  CARE Score: 6  Discharge Goal: Independent    Donning and [place] Footwear: Putting On/Taking Off Footwear  Assistance Needed: Independent  Comment: X  CARE Score: 6  Discharge Goal: Independent      Toiletin [address] needed: Independent  Comment: X  CARE Score: 6  Discharge Goal: Independent      Toilet Transfers: Toilet Transfer  Assistance needed: Independent  Comment: X  CARE Score: 6  Discharge Goal: Independent    Physical Therapy:   Short Term Goals  Time Frame for Short term goals: 10 tx days:  Short term goal 1: Pt will complete sup<->sit Ind. Short term goal 2: Pt will complete OOB transfers Mod Ind-Ind. Short term goal 3: Pt will ambulate 10 ft and 50 ft with turns over level surface using LRAD Mod Ind-Ind. Short term goal 4: Pt will ambulate at least 10 ft of uneven surface and 150 ft over level surface using LRAD with SBA-Sup. Short term goal 5: Pt will ascend/descend curb step using LRAD and 1 flight of stairs using railings with SBA-Sup. Additional Goals?: Yes  Short Term Goal 6: Pt will ascend/descend 4-5 steps using railings Mod Ind. Short Term Goal 7: Pt will complete object retrieval from the floor in standing with 1UE support Mod Ind.             Bed Mobility:   Sit to Lying  Assistance Needed: Independent  Comment: Practiced in regular, flat bed without rails  CARE Score: 6  Discharge Goal: Independent  Roll Left and Right  Assistance Needed: Independent  Comment: Practiced in regular, flat bed without rails  CARE Score: 6  Discharge Goal: Independent  Lying to Sitting on Side of Bed  Assistance Needed: Independent  Comment: Practiced in regular, flat bed without rails  CARE Score: 6  Discharge Goal: Independent    Transfers:    Sit to Stand  Assistance Needed: Independent  Comment: mod I with RW  CARE Score: 6  Discharge Goal: Independent  Chair/Bed-to-Chair Transfer  Assistance Needed: Independent  Comment: mod I with RW  CARE Score: 6  Discharge Goal: Independent     Car Transfer  Assistance Needed: Independent  Comment: mod I with RW  CARE Score: 6  Discharge Goal: Independent    Ambulation:    Walking Ability  Does the Patient Walk?: Yes     Walk 10 Feet  Assistance Needed: Independent  Comment: mod I with RW  CARE Score: 6     Walk 50 Feet with Two Turns  Assistance Needed: Independent  Comment: mod I with RW  CARE Score: 6     Walk 150 Feet  Assistance Needed: Independent  Comment: mod I with RW  Reason if not Attempted: Not attempted due to medical condition or safety concerns  CARE Score: 6  Discharge Goal: Supervision or touching assistance     Walking 10 Feet on Uneven Surfaces  Assistance Needed: Independent  Comment: mod I with RW  CARE Score: 6  Discharge Goal: Independent     1 Step (Curb)  Assistance Needed: Supervision or touching assistance  Comment: Supervision; vcs for proper walker safety and positioning.   CARE Score: 4  Discharge Goal: Supervision or touching assistance     4 Steps  Assistance Needed: Independent  Comment: mod I with 2 rails  CARE Score: 6  Discharge Goal: Independent     12 Steps  Assistance Needed: Independent  Comment: mod I with 2 rails  Reason if not Attempted: Not attempted due to medical condition or safety concerns  CARE Score: 6  Discharge Goal: Supervision or touching assistance       Wheelchair:  w/c Ability: Wheelchair Ability  Uses a Wheelchair and/or Scooter?: No                Balance:        Object: Picking Up Object  Assistance Needed: Independent  Comment: mod I with RW and reacher  CARE Score: 6  Discharge Goal: Independent    I      Exam:    Blood pressure (!) 157/82, pulse 82, temperature 97.9 °F (36.6 °C), temperature source Oral, resp. rate 18, height 5' 5\" (1.651 m), weight 192 lb 14.4 oz (87.5 kg), SpO2 95 %, not currently breastfeeding. General: Sitting upright in bed. Talkative. Alert. Fair-Insight. In no distress. HEENT: Speech was intelligible. Neck supple. Gazing right and left. Pulmonary: Clear and unlabored. Cardiac: Regular rate and rhythm. Abdomen: Patient's abdomen is soft and nondistended. Bowel sounds were present throughout. There was no rebound, guarding or masses noted. Upper extremities: Clumsy movements of both upper limbs with fair dexterity. 4+/5 strength in both upper limbs now. Lower extremities: 4/5 strength across the knees and ankles with poor coordination. Calf soft. Heels clear. No signs of DVT. Sensation blunted about the feet. Sitting balance was good. Standing balance was fair-.    Lab Results   Component Value Date    WBC 7.5 06/02/2022    HGB 15.2 06/02/2022    HCT 47.6 (H) 06/02/2022    .9 (H) 06/02/2022     06/02/2022     No results found for: INR, PROTIME  Lab Results   Component Value Date    CREATININE 1.6 (H) 06/04/2022    BUN 46 (H) 06/04/2022     (L) 06/04/2022    K 4.2 06/04/2022     06/04/2022    CO2 16 (L) 06/04/2022     Lab Results   Component Value Date    ALT 21 06/04/2022    AST 26 06/04/2022    ALKPHOS 81 06/04/2022    BILITOT 0.3 06/04/2022       Expected length of stay  prior to a supervised level of function for discharge home with a walker and Sergio 78 OT/PT is 6/11/2022. Recommendations:      1. Acute CVA with generalized weakness: We will transition to a home care based therapy plan with her discharge tomorrow.   She has benefited from participating in the daily occupational and physical therapy with speech-language pathology.  We are still recommending supervision after discharge, but she is a little better at self correcting losses of balance. She has benefited from adaptive equipment training, aggressive pulmonary hygiene measures and DVT prophylaxis.  Continues to eat better.  Caregiver education planned.  Antiplatelet therapy with Plavix and aspirin for a total of 21 days then withdrawing the Plavix.  Statin therapy.  Verbal cues and SBA needed for transfers today. 2. DVT prophylaxis: Heparin 5000 units every 8 hours.  I must periodically monitor her hemoglobin and platelet count while on this medication.  Weightbearing activities are slowly improving daily.  GI prophylaxis is available.  No new bruising or swelling. We will stop the heparin at discharge as her walking distances continue to improve. 3. Uncontrolled diabetes type 2 with hypoglycemia: Because of poor oral intake she remains a regular diet. Monitoring blood sugars twice daily.  Encouraging consistent oral intake.    4. Acute kidney injury: Avoiding nephrotoxic medications.  Encouraging consistent oral hydration.  Periodic monitoring of her chemistries. 5. Depression with anxiety: Sleep regulation, cautious pain management and BuSpar 15 mg twice daily.  Lorazepam as needed.  Encouraging family involvement with caregiver training. Benefiting from verbal cues to stay on task and treatment. 6. Nicotine addiction: NicoDerm. She anticipates returning to smoking after discharge. 7. Hypertension: Lopressor and Norvasc for blood pressure regulation.  Target systolic blood pressures [phone].  Vital signs are checked at rest and with activity.  Blood pressure is just above the target range today. Monitoring closely.

## 2022-06-11 NOTE — PROGRESS NOTES
The patient and her daughter verbalized understanding of discharge instructions. Departed by a wheelchair accompanied by daughter.

## 2022-06-12 NOTE — DISCHARGE SUMMARY
Patient Name: Sybil Mathews  Patient :  1945  Patient MRN:   [id number]      Admission Date:  2022  Discharge Date: 2022    Admitting diagnosis: Acute CVA ( [address])     Comorbid diagnoses impacting rehabilitation: Generalized weakness, gait disturbance, essential hypertension, acute kidney injury, hyperkalemia, uncontrolled diabetes type 2 with peripheral neuropathy, depression with anxiety, uncontrolled pain (left lower limb), mixed hyperlipidemia, nicotine addiction    Discharging diagnosis: Acute CVA ( [address])     Comorbid diagnoses impacting rehabilitation: Generalized weakness, gait disturbance, essential hypertension, acute kidney injury, hyperkalemia, uncontrolled diabetes type 2 with peripheral neuropathy, depression with anxiety, uncontrolled pain (left lower limb), mixed hyperlipidemia, nicotine addiction     History of present illness: Patient is a 59-year-old right-hand-dominant female with a history of nicotine addiction, depression with anxiety, hypertension diabetes who was found down by her daughter at home in the morning of 2022. Emergency services responded. She is found to be extremely hypoglycemic (blood sugar 33). She responded to glucose administration but declined transport to the hospital.  Later that afternoon she fell in the bathroom. She had altered alertness at that point in the emergency services brought her to our ED. She was still hypoglycemic but had no swelling, hemorrhage or mass identified by brain imaging. Her follow-up MRI showed left cerebellar and left parietal infarctions. She had bilateral upper and lower limb weakness, fluctuating blood sugars and hypotension. Neurology consultation led to DAPT x21 days with anticipation of withdrawing the Plavix then. Prior (baseline) level of function: Independent.     Current level of function:         Current  IRF-CRISTIN and Goals:   Occupational Therapy:    Short Term Goals  Time Frame for Short term goals: STGs=LTGs :   Long Term Goals  Time Frame for Long term goals : 7-10 days or until d/c. Long Term Goal 1: Pt will complete self-feeding c Mod I.  Long Term Goal 2: Pt will complete grooming and oral care tasks c Mod I.  Long Term Goal 3: Pt will complete total body bathing c AE PRN and Mod I.  Long Term Goal 4: Pt will complete UB dressing c Mod I.  Long Term Goal 5: Pt will complete LB dressing c AE PRN and Mod I. Additional Goals?: Yes  Long Term Goal 6: Pt will doff/don footwear c AE PRN and Mod I.  Long Term Goal 7: Pt will perform toileting c Mod I.  Long Term Goal 8: Pt will perform functional transfers (bed, chair, toilet, shower) c DME PRN and Mod I.  Long Term Goal 9: Pt will perform therex/therax to facilitate an increase in strength/endurance/ax tolerance (c emphasis on dynamic standing balance/tolerance > 8 mins, BUE strength, FMC) c Mod I.  Long Term Goal 10: Pt will complete light home management tasks c Mod I. :                                       Eating: Eating  Assistance Needed: Independent  Comment: X  CARE Score: 6  Discharge Goal: Independent       Oral Hygiene: Oral Hygiene  Assistance Needed: Independent  Comment: X  CARE Score: 6  Discharge Goal: Independent    UB/LB Bathing: Shower/Bathe Self  Assistance Needed: Independent  Comment: X  CARE Score: 6  Discharge Goal: Independent    UB Dressing: Upper Body Dressing  Assistance Needed: Independent  Comment: X  CARE Score: 6  Discharge Goal: Independent         LB Dressing: Lower Body Dressing  Assistance Needed: Independent  Comment: X  CARE Score: 6  Discharge Goal: Independent    Donning and [address] Footwear: Putting On/Taking Off Footwear  Assistance Needed: Independent  Comment: X  CARE Score: 6  Discharge Goal: Independent      Toiletin [address] needed: Independent  Comment: X  CARE Score: 6  Discharge Goal: Independent      Toilet Transfers:   Toilet Transfer  Assistance needed: Independent  Comment: X  CARE Score: 6  Discharge Goal: Independent    Physical Therapy:   Short Term Goals  Time Frame for Short term goals: 10 tx days:  Short term goal 1: Pt will complete sup<->sit Ind. Short term goal 2: Pt will complete OOB transfers Mod Ind-Ind. Short term goal 3: Pt will ambulate 10 ft and 50 ft with turns over level surface using LRAD Mod Ind-Ind. Short term goal 4: Pt will ambulate at least 10 ft of uneven surface and 150 ft over level surface using LRAD with SBA-Sup. Short term goal 5: Pt will ascend/descend curb step using LRAD and 1 flight of stairs using railings with SBA-Sup. Additional Goals?: Yes  Short Term Goal 6: Pt will ascend/descend 4-5 steps using railings Mod Ind. Short Term Goal 7: Pt will complete object retrieval from the floor in standing with 1UE support Mod Ind.             Bed Mobility:   Sit to Lying  Assistance Needed: Independent  Comment: Practiced in regular, flat bed without rails  CARE Score: 6  Discharge Goal: Independent  Roll Left and Right  Assistance Needed: Independent  Comment: Practiced in regular, flat bed without rails  CARE Score: 6  Discharge Goal: Independent  Lying to Sitting on Side of Bed  Assistance Needed: Independent  Comment: Practiced in regular, flat bed without rails  CARE Score: 6  Discharge Goal: Independent    Transfers:    Sit to Stand  Assistance Needed: Independent  Comment: mod I with RW  CARE Score: 6  Discharge Goal: Independent  Chair/Bed-to-Chair Transfer  Assistance Needed: Independent  Comment: mod I with RW  CARE Score: 6  Discharge Goal: Independent     Car Transfer  Assistance Needed: Independent  Comment: mod I with RW  CARE Score: 6  Discharge Goal: Independent    Ambulation:    Walking Ability  Does the Patient Walk?: Yes     Walk 10 Feet  Assistance Needed: Independent  Comment: mod I with RW  CARE Score: 6     Walk 50 Feet with Two Turns  Assistance Needed: Independent  Comment: mod I with RW  CARE Score: 6     Walk 150 Feet  Assistance Needed: Independent  Comment: mod I with RW  Reason if not Attempted: Not attempted due to medical condition or safety concerns  CARE Score: 6  Discharge Goal: Supervision or touching assistance     Walking 10 Feet on Uneven Surfaces  Assistance Needed: Independent  Comment: mod I with RW  CARE Score: 6  Discharge Goal: Independent     1 Step (Curb)  Assistance Needed: Supervision or touching assistance  Comment: Supervision; vcs for proper walker safety and positioning. CARE Score: 4  Discharge Goal: Supervision or touching assistance     4 Steps  Assistance Needed: Independent  Comment: mod I with 2 rails  CARE Score: 6  Discharge Goal: Independent     12 Steps  Assistance Needed: Independent  Comment: mod I with 2 rails  Reason if not Attempted: Not attempted due to medical condition or safety concerns  CARE Score: 6  Discharge Goal: Supervision or touching assistance       Wheelchair:  w/c Ability: Wheelchair Ability  Uses a Wheelchair and/or Scooter?: No                Balance:        Object: Picking Up Object  Assistance Needed: Independent  Comment: mod I with RW and reacher  CARE Score: 6  Discharge Goal: Independent    I      Exam:    Blood pressure 110/85, pulse 55, temperature 97.7 °F (36.5 °C), resp. rate 16, height 5' 5\" (1.651 m), weight 194 lb 0.1 oz (88 kg), SpO2 97 %, not currently breastfeeding. General: Talkative. Fair -insight and reasoning. Following directions. In no distress. HEENT: MMM. Clear speech. Neck supple. Gazing right and left. Pulmonary: No wheezing or coughing. Respirations were unlabored. Cardiac: Regular rate and rhythm. Abdomen: Patient's abdomen is soft and nondistended. Bowel sounds were present throughout. There was no rebound, guarding or masses noted. Upper extremities: Clumsy movements of both upper limbs with fair dexterity. 4+/5 strength in both upper limbs now.     Lower extremities: 4/5 strength across the knees and ankles with poor coordination. Calf soft. Heels clear. No signs of DVT. Sensation blunted about the feet. Sitting balance was good. Standing balance was fair-.    Lab Results   Component Value Date    WBC 7.5 06/02/2022    HGB 15.2 06/02/2022    HCT 47.6 (H) 06/02/2022    .9 (H) 06/02/2022     06/02/2022     No results found for: INR, PROTIME  Lab Results   Component Value Date    CREATININE 1.6 (H) 06/04/2022    BUN 46 (H) 06/04/2022     (L) 06/04/2022    K 4.2 06/04/2022     06/04/2022    CO2 16 (L) 06/04/2022     Lab Results   Component Value Date    ALT 21 06/04/2022    AST 26 06/04/2022    ALKPHOS 81 06/04/2022    BILITOT 0.3 06/04/2022           The patient presented to the ARU with the above history requiring a multidisciplinary treatment plan including close medical supervision by the Cristy Langley Director. The patient participated in the prescribed therapy treatment plan with reasonable compliance and progressive tolerance. They avoided significant medical complications. By the time of discharge the patient had become safer with adaptive equipment to transfer and toilet with a FWW with the supervision of family/caregivers. The patient was tolerating an oral diet without choking/coughing and was back to their baseline with regards to bowel and bladder control. Discharge instructions were reviewed with the patient. The patient is to follow up with the PCP in 1 week. No driving. [facility] PT/OT will be arranged.        Medication List      START taking these medications    aspirin 81 MG chewable tablet  Take 1 tablet by mouth daily     clopidogrel 75 MG tablet  Commonly known as: PLAVIX  Take 1 tablet by mouth daily for 10 doses     docusate sodium 100 mg capsule  Commonly known as: COLACE  Take 1 capsule by mouth daily     senna 8.6 MG tablet  Commonly known as: SENOKOT  Take 1 tablet by mouth nightly        CHANGE how you take these medications    atorvastatin 40 MG tablet  Commonly known as: LIPITOR  TAKE ONE TABLET BY MOUTH DAILY  What changed: See the new instructions. busPIRone 15 MG tablet  Commonly known as: BUSPAR  Take 15 mg by mouth 2 times daily  What changed: when to take this     metoprolol tartrate 25 MG tablet  Commonly known as: LOPRESSOR  Take 1 tablet by mouth 2 times daily  What changed:   · medication strength  · See the new instructions. CONTINUE taking these medications    amLODIPine 10 MG tablet  Commonly known as: NORVASC  Take 1 tablet by mouth daily     levothyroxine 88 MCG tablet  Commonly known as: SYNTHROID  Take 1 tablet by mouth Daily     LORazepam 1 MG tablet  Commonly known as: ATIVAN     sertraline 25 MG tablet  Commonly known as: ZOLOFT        STOP taking these medications    chlorthalidone 25 MG tablet  Commonly known as: HYGROTON     glimepiride 4 MG tablet  Commonly known as: AMARYL     lisinopril 20 MG tablet  Commonly known as: PRINIVIL;ZESTRIL     pioglitazone 30 MG tablet  Commonly known as: ACTOS     spironolactone 100 MG tablet  Commonly known as: Aldactone           Where to Get Your Medications      You can get these medications from any pharmacy    Bring a paper prescription for each of these medications  · clopidogrel 75 MG tablet  · metoprolol tartrate 25 MG tablet  You don't need a prescription for these medications  · aspirin 81 MG chewable tablet  · docusate sodium 100 mg capsule  · senna 8.6 MG tablet     Information about where to get these medications is not yet available    Ask your nurse or doctor about these medications  · busPIRone 15 MG tablet         CONDITION ON DISCHARGE: Stable. The prognosis is fair for further improvements in ADL's and safety with adapted gait/transfers. Record review, patient exam, discharge instructions, medication reconciliation and summary for this discharge visit took more than 30 minutes.

## 2022-06-13 ENCOUNTER — CARE COORDINATION (OUTPATIENT)
Dept: CASE MANAGEMENT | Age: 77
End: 2022-06-13

## 2022-06-13 ENCOUNTER — TELEPHONE (OUTPATIENT)
Dept: INTERNAL MEDICINE CLINIC | Age: 77
End: 2022-06-13

## 2022-06-13 NOTE — TELEPHONE ENCOUNTER
[facility] Transitions Initial Follow Up Call    Outreach made within 2 business days of discharge: Yes       Patient: Abbey Goodpasture Patient : 1945   MRN: [id number]  Reason for Admission: There are no discharge diagnoses documented for the most recent discharge. Discharge Date: 22       Spoke with: Tangeal Gilliland    Discharge department/facility: [facility]    TCM Interactive Patient Contact:  Was patient able to fill all prescriptions: Yes  Was patient instructed to bring all medications to the follow-up visit: Yes  Is patient taking all medications as directed in the discharge summary?  Yes  Does patient understand their discharge instructions: Yes  Does patient have questions or concerns that need addressed prior to 7-14 day follow up office visit: no    Scheduled 22  Scheduled appointment with PCP within 7-14 days    Follow Up  Future Appointments   Date Time Provider Severino Rousseau   2022 10:45 AM MD Rayna Bains   2022 11:30 AM MD ARTEM Bains   10/3/2022 10:00 AM SCHEDULE, [address] [place] AW LPN ARTEM REED [place] [address]

## 2022-06-13 NOTE — CARE COORDINATION
Care Transitions Outreach Attempt       Acute CVA (cerebrovascular accident)    Acute CVA (cerebrovascular accident)     Call within 2 business days of discharge: Yes   Attempted to reach patient for transitions of care follow up. Unable to reach patient. Patient: Raciel Campbell Patient : 1945 MRN: [id number]    Last Discharge [facility]       Complaint Diagnosis Description Type Department Provider    22   Admission (Discharged) Leo Munson MD        24 Hour Transition of Care Call:    1st Attempt to contact patient for Initial 24 Hour Transition from hospital to home Call:   Patient not reached. Voice Mail Not set up at this time - Unable to leave message    [address] spoke to Junaid. She states they saw patient Saturday on the weekend. Patient is current. Needs 7 day Hospital F/U. Sent Email Message to Maritza Loya Practice Manager concerning need for 7 day Hospital F/U appointment. Will continue to follow. Konstantin Busch LPN    [phone]  [facility] / [place] [address] Coordinator          Was this an external facility discharge?  No Discharge Facility: [facility]    Noted following upcoming appointments from discharge chart review:   REHABILITATION [facility] follow up appointment(s):   Future Appointments   Date Time Provider Severino Rousseau   2022 11:30 AM MD ARTEM Palomo   10/3/2022 10:00 AM SCHEDULE, RIZWANA CONLEY     [facility] follow up appointment(s):

## 2022-06-14 ENCOUNTER — CARE COORDINATION (OUTPATIENT)
Dept: CASE MANAGEMENT | Age: 77
End: 2022-06-14

## 2022-06-14 NOTE — CARE COORDINATION
Care Transitions Outreach Attempt    Call within 2 business days of discharge: Yes   Attempted to reach patient for transitions of care follow up. Unable to reach patient. Patient: Leodan Velez Patient : 1945 MRN: [id number]    Last Discharge [facility]       Complaint Diagnosis Description Type Department Provider    22   Admission (Discharged) Tyrel Echeverria MD            Was this an external facility discharge? No Discharge Facility: [facility]    Noted following upcoming appointments from discharge chart review:   [facility] follow up appointment(s):   Future Appointments   Date Time Provider Severino Rousseau   2022 10:45 AM MD Cee Sol [place]   2022 11:30 AM MD ARTEM Sol [place]   10/3/2022 10:00 AM SCHEDULE, [facility] LPN N DEREK [place]       2nd attempt to contact patient for initial care transition follow up. Unable to reach patient. Unable to leave a message d/t voice mail not being set up. No current HIPAA on file. Episode to be resolved and CTN to sign off if return call is not received from the patient.        Elkin Townsend, RN   Care Transition Nurse

## 2022-06-17 DIAGNOSIS — E03.4 HYPOTHYROIDISM DUE TO ACQUIRED ATROPHY OF THYROID: ICD-10-CM

## 2022-06-17 RX ORDER — LEVOTHYROXINE SODIUM 88 UG/1
88 TABLET ORAL DAILY
Qty: 30 TABLET | Refills: 3 | Status: SHIPPED | OUTPATIENT
Start: 2022-06-17 | End: 2022-10-17

## 2022-08-03 PROBLEM — F41.9 ANXIETY: Status: ACTIVE | Noted: 2022-08-03

## 2022-08-03 PROBLEM — F41.8 DEPRESSION WITH ANXIETY: Status: RESOLVED | Noted: 2018-02-16 | Resolved: 2022-08-03

## 2022-08-03 PROBLEM — E11.65 TYPE 2 DIABETES MELLITUS WITH HYPERGLYCEMIA, WITH LONG-TERM CURRENT USE OF INSULIN (HCC): Status: ACTIVE | Noted: 2022-08-03

## 2022-08-03 PROBLEM — Z79.4 TYPE 2 DIABETES MELLITUS WITH HYPERGLYCEMIA, WITH LONG-TERM CURRENT USE OF INSULIN (HCC): Status: ACTIVE | Noted: 2022-08-03

## 2022-08-03 PROBLEM — R53.1 GENERALIZED WEAKNESS: Status: RESOLVED | Noted: 2022-06-01 | Resolved: 2022-08-03

## 2022-08-03 PROBLEM — E11.649 UNCONTROLLED TYPE 2 DIABETES MELLITUS WITH HYPOGLYCEMIA WITHOUT COMA (HCC): Status: RESOLVED | Noted: 2018-02-16 | Resolved: 2022-08-03

## 2022-08-04 ENCOUNTER — OFFICE VISIT (OUTPATIENT)
Dept: INTERNAL MEDICINE CLINIC | Age: 77
End: 2022-08-04
Payer: MEDICARE

## 2022-08-04 VITALS
SYSTOLIC BLOOD PRESSURE: 175 MMHG | OXYGEN SATURATION: 97 % | HEART RATE: 51 BPM | WEIGHT: 191.2 LBS | DIASTOLIC BLOOD PRESSURE: 87 MMHG | BODY MASS INDEX: 31.86 KG/M2 | HEIGHT: 65 IN

## 2022-08-04 DIAGNOSIS — I63.9 ACUTE CVA (CEREBROVASCULAR ACCIDENT) (HCC): ICD-10-CM

## 2022-08-04 DIAGNOSIS — I10 ESSENTIAL HYPERTENSION: ICD-10-CM

## 2022-08-04 DIAGNOSIS — E78.2 MIXED HYPERLIPIDEMIA: ICD-10-CM

## 2022-08-04 DIAGNOSIS — M17.0 ARTHRITIS OF BOTH KNEES: ICD-10-CM

## 2022-08-04 DIAGNOSIS — E03.4 HYPOTHYROIDISM DUE TO ACQUIRED ATROPHY OF THYROID: ICD-10-CM

## 2022-08-04 DIAGNOSIS — F32.A CHRONIC DEPRESSION: ICD-10-CM

## 2022-08-04 DIAGNOSIS — N18.4 STAGE 4 CHRONIC KIDNEY DISEASE (HCC): ICD-10-CM

## 2022-08-04 DIAGNOSIS — Z72.0 TOBACCO ABUSE: ICD-10-CM

## 2022-08-04 DIAGNOSIS — E11.65 TYPE 2 DIABETES MELLITUS WITH HYPERGLYCEMIA, WITHOUT LONG-TERM CURRENT USE OF INSULIN (HCC): Primary | ICD-10-CM

## 2022-08-04 DIAGNOSIS — F41.9 ANXIETY: ICD-10-CM

## 2022-08-04 LAB
A/G RATIO: 1.6 (ref 1.1–2.2)
ALBUMIN SERPL-MCNC: 4.2 G/DL (ref 3.4–5)
ALP BLD-CCNC: 126 U/L (ref 40–129)
ALT SERPL-CCNC: 12 U/L (ref 10–40)
ANION GAP SERPL CALCULATED.3IONS-SCNC: 14 MMOL/L (ref 3–16)
AST SERPL-CCNC: 11 U/L (ref 15–37)
BASOPHILS ABSOLUTE: 0 K/UL (ref 0–0.2)
BASOPHILS RELATIVE PERCENT: 0.7 %
BILIRUB SERPL-MCNC: 0.4 MG/DL (ref 0–1)
BUN BLDV-MCNC: 44 MG/DL (ref 7–20)
CALCIUM SERPL-MCNC: 10.3 MG/DL (ref 8.3–10.6)
CHLORIDE BLD-SCNC: 95 MMOL/L (ref 99–110)
CHP ED QC CHECK: NORMAL
CO2: 27 MMOL/L (ref 21–32)
CREAT SERPL-MCNC: 2 MG/DL (ref 0.6–1.2)
EOSINOPHILS ABSOLUTE: 0.1 K/UL (ref 0–0.6)
EOSINOPHILS RELATIVE PERCENT: 2.5 %
GFR AFRICAN AMERICAN: 29
GFR NON-AFRICAN AMERICAN: 24
GLUCOSE BLD-MCNC: 237 MG/DL (ref 70–99)
GLUCOSE BLD-MCNC: 285 MG/DL
HCT VFR BLD CALC: 40.8 % (ref 36–48)
HEMOGLOBIN: 14.1 G/DL (ref 12–16)
LYMPHOCYTES ABSOLUTE: 0.9 K/UL (ref 1–5.1)
LYMPHOCYTES RELATIVE PERCENT: 17.7 %
MCH RBC QN AUTO: 32.4 PG (ref 26–34)
MCHC RBC AUTO-ENTMCNC: 34.5 G/DL (ref 31–36)
MCV RBC AUTO: 93.7 FL (ref 80–100)
MONOCYTES ABSOLUTE: 0.3 K/UL (ref 0–1.3)
MONOCYTES RELATIVE PERCENT: 6.5 %
NEUTROPHILS ABSOLUTE: 3.6 K/UL (ref 1.7–7.7)
NEUTROPHILS RELATIVE PERCENT: 72.6 %
PDW BLD-RTO: 13.7 % (ref 12.4–15.4)
PLATELET # BLD: 146 K/UL (ref 135–450)
PMV BLD AUTO: 8.3 FL (ref 5–10.5)
POTASSIUM SERPL-SCNC: 3.9 MMOL/L (ref 3.5–5.1)
RBC # BLD: 4.35 M/UL (ref 4–5.2)
SODIUM BLD-SCNC: 136 MMOL/L (ref 136–145)
TOTAL PROTEIN: 6.8 G/DL (ref 6.4–8.2)
WBC # BLD: 5 K/UL (ref 4–11)

## 2022-08-04 PROCEDURE — 3044F HG A1C LEVEL LT 7.0%: CPT | Performed by: INTERNAL MEDICINE

## 2022-08-04 PROCEDURE — 1123F ACP DISCUSS/DSCN MKR DOCD: CPT | Performed by: INTERNAL MEDICINE

## 2022-08-04 PROCEDURE — 82962 GLUCOSE BLOOD TEST: CPT | Performed by: INTERNAL MEDICINE

## 2022-08-04 PROCEDURE — G8417 CALC BMI ABV UP PARAM F/U: HCPCS | Performed by: INTERNAL MEDICINE

## 2022-08-04 PROCEDURE — 99214 OFFICE O/P EST MOD 30 MIN: CPT | Performed by: INTERNAL MEDICINE

## 2022-08-04 PROCEDURE — 4004F PT TOBACCO SCREEN RCVD TLK: CPT | Performed by: INTERNAL MEDICINE

## 2022-08-04 PROCEDURE — G8400 PT W/DXA NO RESULTS DOC: HCPCS | Performed by: INTERNAL MEDICINE

## 2022-08-04 PROCEDURE — G8427 DOCREV CUR MEDS BY ELIG CLIN: HCPCS | Performed by: INTERNAL MEDICINE

## 2022-08-04 PROCEDURE — 1090F PRES/ABSN URINE INCON ASSESS: CPT | Performed by: INTERNAL MEDICINE

## 2022-08-04 PROCEDURE — 36415 COLL VENOUS BLD VENIPUNCTURE: CPT | Performed by: INTERNAL MEDICINE

## 2022-08-04 RX ORDER — CLOPIDOGREL BISULFATE 75 MG/1
75 TABLET ORAL DAILY
Qty: 90 TABLET | Refills: 1 | Status: SHIPPED | OUTPATIENT
Start: 2022-08-04 | End: 2022-08-05 | Stop reason: ALTCHOICE

## 2022-08-04 RX ORDER — CLONIDINE HYDROCHLORIDE 0.1 MG/1
0.1 TABLET ORAL 2 TIMES DAILY
Qty: 60 TABLET | Refills: 3 | Status: SHIPPED | OUTPATIENT
Start: 2022-08-04

## 2022-08-04 RX ORDER — GLIMEPIRIDE 1 MG/1
1 TABLET ORAL EVERY MORNING
Qty: 30 TABLET | Refills: 3 | Status: SHIPPED | OUTPATIENT
Start: 2022-08-04 | End: 2022-09-01 | Stop reason: SDUPTHER

## 2022-08-04 RX ORDER — ATORVASTATIN CALCIUM 40 MG/1
40 TABLET, FILM COATED ORAL DAILY
Qty: 90 TABLET | Refills: 1 | Status: SHIPPED | OUTPATIENT
Start: 2022-08-04 | End: 2022-09-01 | Stop reason: SDUPTHER

## 2022-08-04 SDOH — ECONOMIC STABILITY: FOOD INSECURITY: WITHIN THE PAST 12 MONTHS, THE FOOD YOU BOUGHT JUST DIDN'T LAST AND YOU DIDN'T HAVE MONEY TO GET MORE.: NEVER TRUE

## 2022-08-04 SDOH — ECONOMIC STABILITY: FOOD INSECURITY: WITHIN THE PAST 12 MONTHS, YOU WORRIED THAT YOUR FOOD WOULD RUN OUT BEFORE YOU GOT MONEY TO BUY MORE.: SOMETIMES TRUE

## 2022-08-04 ASSESSMENT — PATIENT HEALTH QUESTIONNAIRE - PHQ9
5. POOR APPETITE OR OVEREATING: 0
SUM OF ALL RESPONSES TO PHQ QUESTIONS 1-9: 13
3. TROUBLE FALLING OR STAYING ASLEEP: 3
1. LITTLE INTEREST OR PLEASURE IN DOING THINGS: 1
9. THOUGHTS THAT YOU WOULD BE BETTER OFF DEAD, OR OF HURTING YOURSELF: 0
10. IF YOU CHECKED OFF ANY PROBLEMS, HOW DIFFICULT HAVE THESE PROBLEMS MADE IT FOR YOU TO DO YOUR WORK, TAKE CARE OF THINGS AT HOME, OR GET ALONG WITH OTHER PEOPLE: 1
SUM OF ALL RESPONSES TO PHQ QUESTIONS 1-9: 13
6. FEELING BAD ABOUT YOURSELF - OR THAT YOU ARE A FAILURE OR HAVE LET YOURSELF OR YOUR FAMILY DOWN: 2
7. TROUBLE CONCENTRATING ON THINGS, SUCH AS READING THE NEWSPAPER OR WATCHING TELEVISION: 2
SUM OF ALL RESPONSES TO PHQ9 QUESTIONS 1 & 2: 2
8. MOVING OR SPEAKING SO SLOWLY THAT OTHER PEOPLE COULD HAVE NOTICED. OR THE OPPOSITE, BEING SO FIGETY OR RESTLESS THAT YOU HAVE BEEN MOVING AROUND A LOT MORE THAN USUAL: 1
4. FEELING TIRED OR HAVING LITTLE ENERGY: 3
2. FEELING DOWN, DEPRESSED OR HOPELESS: 1

## 2022-08-04 ASSESSMENT — SOCIAL DETERMINANTS OF HEALTH (SDOH): HOW HARD IS IT FOR YOU TO PAY FOR THE VERY BASICS LIKE FOOD, HOUSING, MEDICAL CARE, AND HEATING?: SOMEWHAT HARD

## 2022-08-04 NOTE — PROGRESS NOTES
Name: Vincenzo Edmondson  [id number]  Age: 68 y.o. YOB: 1945  Sex: female    CHIEF COMPLAINT:    Chief Complaint   Patient presents with    Diabetes    Other     Pt asked if you can look at lump on left palm       HISTORY OF PRESENT ILLNESS:     This is a pleasant  68 y.o. female  is seen today for management of chronic medical problems and medications refills. Previous records reviewed . She was in the [facility]  from 5/31/2022- 6/6/2022 for acute/subacute stroke, acute renal failure and hypoglycemia and later was transferred to ARU for 7 days. Now home. No residual effects. She claims she quit smoking since hospitalization. She was Hypoglycemia in Hospital and her diabetic medications were discontinued and since then her blood sugars are high and also  BP is high. Doing OK  otherwise. .  Denies CP or SOB. No fever , sore throat or cough or congestion. Denies any abdominal pain. Appetite OK. Bowels moving [address] No urinary symptoms. Still with pain in her knees but better. Takes Tylenol for it. Hearing is ok. Vision Ok with glasses. Denies  any significant skin lesions. Depression and anxiety better with medications. Seeing a Psychiatrist periodically for her psych problems. She is checking her sugars regularly at home. Her daughter is taking care of her. No other complaints. She is  vaccinated for COVID-19 x2. But has not taken booster dose yet.     Past Medical History:    Patient Active Problem List   Diagnosis    Acute kidney injury ([facility]) with acute tubular necrosis (ATN) (HCC)    Stage 4 chronic kidney disease (HCC)    Essential hypertension    Hyperlipidemia    Hypothyroidism    Tobacco abuse    Chronic depression    RLS (restless legs syndrome)    Arthritis of both knees    Ischemic stroke ([address]    Acute CVA (cerebrovascular accident) ([address]    Gait disturbance    Type 2 diabetes mellitus with hyperglycemia, without long-term current use of insulin ([address]    Anxiety Past Surgical History:    History reviewed. No pertinent surgical history. Social History:   Social History     Tobacco Use    Smoking status: Every Day     Packs/day: 1.00     Years: 59.00     Pack years: 59.00     Types: Cigarettes     Start date: 1962    Smokeless tobacco: Never   Substance Use Topics    Alcohol use: No       Family History:       Problem Relation Age of Onset    Diabetes Father     High Blood Pressure Father     Kidney Disease Father     Cancer Brother         unknown origin    Stroke Maternal Grandmother     Cancer Paternal Grandmother     Other Mother         hysterectomy    No Known Problems Brother        Allergies:  Seasonal    Current Medications :      Prior to Admission medications    Medication Sig Start Date End Date Taking? Authorizing Provider   cloNIDine (CATAPRES) 0.1 MG tablet Take 1 tablet by mouth in the morning and 1 tablet before bedtime. 8/4/22  Yes Brenda Carver MD   dapagliflozin Alejandro Nageotte) 5 MG tablet Take 1 tablet by mouth every morning 8/4/22  Yes Brenda Carver MD   glimepiride (AMARYL) 1 MG tablet Take 1 tablet by mouth every morning 8/4/22  Yes Brenda Carver MD   metoprolol tartrate (LOPRESSOR) 25 MG tablet Take 1 tablet by mouth in the morning and 1 tablet before bedtime.  7/27/22  Yes SILVIA Ramos CNP   chlorthalidone (HYGROTON) 25 MG tablet Take 25 mg by mouth daily   Yes Historical Provider, MD   lisinopril (PRINIVIL;ZESTRIL) 40 MG tablet Take 1 tablet by mouth daily 7/8/22  Yes Juan Carlos Maria MD   levothyroxine (SYNTHROID) 88 MCG tablet Take 1 tablet by mouth Daily 6/17/22  Yes Brenda Carver MD   aspirin 81 MG chewable tablet Take 1 tablet by mouth daily 6/11/22  Yes Oralia Watt MD   busPIRone (BUSPAR) 15 MG tablet Take 15 mg by mouth 2 times daily 6/10/22  Yes Oralia Watt MD   clopidogrel (PLAVIX) 75 MG tablet Take 1 tablet by mouth daily for 10 doses 6/11/22 8/4/22 Yes Oralia Watt MD   amLODIPine (NORVASC) 10 MG tablet Take 1 tablet by mouth daily 5/13/22  Yes Brenda Carver MD   atorvastatin (LIPITOR) 40 MG tablet TAKE ONE TABLET BY MOUTH DAILY  Patient taking differently: at bedtime 3/16/22  Yes Brenda Carver MD   LORazepam (ATIVAN) 1 MG tablet Take 1 mg by mouth every 8 hours as needed for Anxiety. Yes Historical Provider, MD   sertraline (ZOLOFT) 25 MG tablet Take 200 mg by mouth daily    Yes Historical Provider, MD   docusate sodium (COLACE) 100 mg capsule Take 1 capsule by mouth daily  Patient not taking: No sig reported 6/11/22   Oralia Watt MD       LAB DATA: Reviewed. REVIEW OF SYSTEMS:   see HPI/ Comprehensive review of systems negative except for the ones mentioned in HPI. PHYSICAL EXAMINATION:   BP (!) 175/87 (Site: Right Upper Arm, Position: Sitting, Cuff Size: Medium Adult)   Pulse 51   Ht 5' 5\" (1.651 m)   Wt 191 lb 3.2 oz (86.7 kg)   SpO2 97%   BMI 31.82 kg/m²      GENERAL APPEARANCE:      Alert, oriented x 3, well developed, cooperative, not in any distress, appears stated age. HEAD:                         Normocephalic, atraumatic  EYES:                          PERRLA, EOMI, lids normal, conjuctivea clear, sclera anicteric. NECK:                         Supple, symmetrical,  trachea midline, no thyromegaly, no JVD, no lymphadenopathy. LUNGS:                       Clear to auscultation bilaterally, respirations unlabored, accessory muscles are not used. HEART:                       Regular rate and rhythm, S1 and S2 normal, no murmur, rub or gallop. PMI in MCL. ABDOMEN:                 Soft, non-tender, bowel sounds are normoactive, no masses, no hepatospleenomegaly. .  Patient is obese. EXTREMITY:              no bipedal edema  NEURO:                      Alert, oriented to person, place and time. Grossly intact. Musculoskeletal:         No kyphosis or scoliosis, mild tenderness in her knees. Skin:                            Warm and dry.  No rash or obvious suspicious lesions. PSYCH:                       Mood euthymic, insight and judgement good. ASSESSMENT/PLAN:    1. Type 2 diabetes mellitus with hyperglycemia, without long-term current use of insulin ([facility])  Advised low sugar diet and exercise and weight loss. Start him on Farxiga and glimepiride. Instructions were given to her and her daughter to check her sugars frequently and call if she is hypoglycemic. Her daughter claimed that her hypoglycemia was possibly secondary to her not eating as she was not feeling good before hospitalization.  - POCT Glucose  - dapagliflozin (FARXIGA) 5 MG tablet; Take 1 tablet by mouth every morning  Dispense: 30 tablet; Refill: 5  - glimepiride (AMARYL) 1 MG tablet; Take 1 tablet by mouth every morning  Dispense: 30 tablet; Refill: 3    2. Stage 4 chronic kidney disease ([address]  Improving. Avoid NSAID and keep well-hydrated. Advised to continue to follow with her nephrologist.  - Comprehensive Metabolic Panel    3. Essential hypertension  Continue current medications, denies side effect with medicationss. Low salt diet and exercise advised. Patient is on multiple medication for blood pressure. Will add clonidine as needed. - cloNIDine (CATAPRES) 0.1 MG tablet; Take 1 tablet by mouth in the morning and 1 tablet before bedtime. Dispense: 60 tablet; Refill: 3  - CBC with Auto Differential  - Comprehensive Metabolic Panel    4. Mixed hyperlipidemia  Advised low-cholesterol diet and exercise. Continue Lipitor    5. Hypothyroidism due to acquired atrophy of thyroid  Continue Synthroid    6. Tobacco abuse  Patient has quit smoking since hospitalization. Encourage not to restart smoking      7. Chronic depression  Advised to continue to follow with her psychiatrist and take psych medications regularly    8. Anxiety  Advised to continue to follow with her psychiatrist and take psych medications regularly.     9. Arthritis of both knees  Advised

## 2022-08-05 RX ORDER — CLOPIDOGREL BISULFATE 75 MG/1
75 TABLET ORAL DAILY
COMMUNITY
End: 2022-08-05 | Stop reason: SDUPTHER

## 2022-08-05 RX ORDER — CLOPIDOGREL BISULFATE 75 MG/1
75 TABLET ORAL DAILY
Qty: 30 TABLET | Refills: 3 | Status: SHIPPED | OUTPATIENT
Start: 2022-08-05

## 2022-09-01 ENCOUNTER — OFFICE VISIT (OUTPATIENT)
Dept: INTERNAL MEDICINE CLINIC | Age: 77
End: 2022-09-01
Payer: MEDICARE

## 2022-09-01 VITALS
SYSTOLIC BLOOD PRESSURE: 136 MMHG | OXYGEN SATURATION: 95 % | HEART RATE: 72 BPM | BODY MASS INDEX: 32.12 KG/M2 | HEIGHT: 65 IN | WEIGHT: 192.8 LBS | DIASTOLIC BLOOD PRESSURE: 84 MMHG

## 2022-09-01 DIAGNOSIS — E03.4 HYPOTHYROIDISM DUE TO ACQUIRED ATROPHY OF THYROID: ICD-10-CM

## 2022-09-01 DIAGNOSIS — E78.2 MIXED HYPERLIPIDEMIA: ICD-10-CM

## 2022-09-01 DIAGNOSIS — Z86.73 HISTORY OF ISCHEMIC STROKE: ICD-10-CM

## 2022-09-01 DIAGNOSIS — F32.A CHRONIC DEPRESSION: ICD-10-CM

## 2022-09-01 DIAGNOSIS — N18.4 STAGE 4 CHRONIC KIDNEY DISEASE (HCC): ICD-10-CM

## 2022-09-01 DIAGNOSIS — M17.0 ARTHRITIS OF BOTH KNEES: ICD-10-CM

## 2022-09-01 DIAGNOSIS — Z72.0 TOBACCO ABUSE: ICD-10-CM

## 2022-09-01 DIAGNOSIS — E11.65 TYPE 2 DIABETES MELLITUS WITH HYPERGLYCEMIA, WITHOUT LONG-TERM CURRENT USE OF INSULIN (HCC): ICD-10-CM

## 2022-09-01 DIAGNOSIS — I10 ESSENTIAL HYPERTENSION: Primary | ICD-10-CM

## 2022-09-01 PROBLEM — N17.0 ACUTE KIDNEY INJURY (AKI) WITH ACUTE TUBULAR NECROSIS (ATN) (HCC): Status: RESOLVED | Noted: 2018-02-16 | Resolved: 2022-09-01

## 2022-09-01 PROBLEM — I63.9 ACUTE CVA (CEREBROVASCULAR ACCIDENT) (HCC): Status: RESOLVED | Noted: 2022-06-06 | Resolved: 2022-09-01

## 2022-09-01 LAB
CHP ED QC CHECK: NORMAL
GLUCOSE BLD-MCNC: 134 MG/DL

## 2022-09-01 PROCEDURE — 99214 OFFICE O/P EST MOD 30 MIN: CPT | Performed by: INTERNAL MEDICINE

## 2022-09-01 PROCEDURE — 1036F TOBACCO NON-USER: CPT | Performed by: INTERNAL MEDICINE

## 2022-09-01 PROCEDURE — 1123F ACP DISCUSS/DSCN MKR DOCD: CPT | Performed by: INTERNAL MEDICINE

## 2022-09-01 PROCEDURE — 1090F PRES/ABSN URINE INCON ASSESS: CPT | Performed by: INTERNAL MEDICINE

## 2022-09-01 PROCEDURE — 82962 GLUCOSE BLOOD TEST: CPT | Performed by: INTERNAL MEDICINE

## 2022-09-01 PROCEDURE — G8400 PT W/DXA NO RESULTS DOC: HCPCS | Performed by: INTERNAL MEDICINE

## 2022-09-01 PROCEDURE — G8427 DOCREV CUR MEDS BY ELIG CLIN: HCPCS | Performed by: INTERNAL MEDICINE

## 2022-09-01 PROCEDURE — 3044F HG A1C LEVEL LT 7.0%: CPT | Performed by: INTERNAL MEDICINE

## 2022-09-01 PROCEDURE — G8417 CALC BMI ABV UP PARAM F/U: HCPCS | Performed by: INTERNAL MEDICINE

## 2022-09-01 RX ORDER — ATORVASTATIN CALCIUM 40 MG/1
40 TABLET, FILM COATED ORAL DAILY
Qty: 30 TABLET | Refills: 3 | Status: SHIPPED | OUTPATIENT
Start: 2022-09-01

## 2022-09-01 RX ORDER — BUSPIRONE HYDROCHLORIDE 10 MG/1
20 TABLET ORAL 2 TIMES DAILY
COMMUNITY
End: 2022-10-31

## 2022-09-01 RX ORDER — GLIMEPIRIDE 1 MG/1
1 TABLET ORAL EVERY MORNING
Qty: 30 TABLET | Refills: 3 | Status: SHIPPED | OUTPATIENT
Start: 2022-09-01

## 2022-09-01 RX ORDER — AMLODIPINE BESYLATE 10 MG/1
10 TABLET ORAL DAILY
Qty: 30 TABLET | Refills: 3 | Status: SHIPPED | OUTPATIENT
Start: 2022-09-01

## 2022-09-01 NOTE — PROGRESS NOTES
Name: Leodan De Los Santos  [id number]  Age: 68 y.o. YOB: 1945  Sex: female    CHIEF COMPLAINT:    Chief Complaint   Patient presents with    Diabetes    Hypertension    Other     Other chronic conditions         HISTORY OF PRESENT ILLNESS:     This is a pleasant  68 y.o. female  is seen today for management of chronic medical problems and medications refills. Previous records reviewed . She apparently had a acute/subacute in May 2022. Recovered nicely. She is doing better. No significant weakness or dizziness except when she tries to get up fast.  Doing OK  otherwise. .  Denies CP or SOB. No fever , sore throat or cough or congestion. Denies any abdominal pain. Appetite OK. Bowels moving [id number] No urinary symptoms. Still with pain in her knees but better. Takes Tylenol for it. Hearing is ok. Vision Ok with glasses. Denies  any significant skin lesions. Depression and anxiety better with medications. Seeing a Psychiatrist periodically for her psych problems. She is checking her sugars regularly at home. Usually @ 130-160     No other complaints. She is  vaccinated for COVID-19 x2. But has not taken booster dose yet. Past Medical History:    Patient Active Problem List   Diagnosis    Stage 4 chronic kidney disease (Psychiatric)    Essential hypertension    Hyperlipidemia    Hypothyroidism    Tobacco abuse    Chronic depression    RLS (restless legs syndrome)    Arthritis of both knees    Ischemic stroke (Psychiatric)    Gait disturbance    Type 2 diabetes mellitus with hyperglycemia, without long-term current use of insulin (HCC)    Anxiety        Past Surgical History:    History reviewed. No pertinent surgical history.     Social History:   Social History     Tobacco Use    Smoking status: Former     Packs/day: 1.00     Years: 59.00     Pack years: 59.00     Types: Cigarettes     Start date:      Quit date: 2022     Years since quittin.2    Smokeless tobacco: Never   Substance Use Topics    Alcohol use: No       Family History:       Problem Relation Age of Onset    Diabetes Father     High Blood Pressure Father     Kidney Disease Father     Cancer Brother         unknown origin    Stroke Maternal Grandmother     Cancer Paternal Grandmother     Other Mother         hysterectomy    No Known Problems Brother        Allergies:  Seasonal    Current Medications :      Prior to Admission medications    Medication Sig Start Date End Date Taking? Authorizing Provider   busPIRone (BUSPAR) 10 MG tablet Take 20 mg by mouth in the morning and 20 mg in the evening. Yes Historical Provider, MD   metoprolol tartrate (LOPRESSOR) 25 MG tablet TAKE ONE TABLET BY MOUTH EVERY MORNING AND TAKE ONE TABLET BY MOUTH EVERY NIGHT BEFORE BEDTIME 9/1/22  Yes Rivas Mederos MD   amLODIPine (NORVASC) 10 MG tablet Take 1 tablet by mouth daily 9/1/22  Yes Rivas Mederos MD   glimepiride (AMARYL) 1 MG tablet Take 1 tablet by mouth every morning 9/1/22  Yes Rivas Mederos MD   atorvastatin (LIPITOR) 40 MG tablet Take 1 tablet by mouth daily TAKE ONE TABLET BY MOUTH DAILY 9/1/22  Yes Rivas Mederos MD   clopidogrel (PLAVIX) 75 MG tablet Take 1 tablet by mouth in the morning. 8/5/22  Yes Rivas Mederos MD   cloNIDine (CATAPRES) 0.1 MG tablet Take 1 tablet by mouth in the morning and 1 tablet before bedtime. 8/4/22  Yes Rivas Mederos MD   chlorthalidone (HYGROTON) 25 MG tablet Take 25 mg by mouth daily   Yes Historical Provider, MD   lisinopril (PRINIVIL;ZESTRIL) 40 MG tablet Take 1 tablet by mouth daily 7/8/22  Yes Nikia Draper MD   levothyroxine (SYNTHROID) 88 MCG tablet Take 1 tablet by mouth Daily 6/17/22  Yes Rivas eMderos MD   aspirin 81 MG chewable tablet Take 1 tablet by mouth daily 6/11/22  Yes JAMES Rivera MD   LORazepam (ATIVAN) 1 MG tablet Take 1 mg by mouth every 8 hours as needed for Anxiety.     Yes Historical Provider, MD   sertraline (ZOLOFT) 25 MG tablet Take 200 mg by mouth daily    Yes Historical Provider, MD       LAB DATA: Reviewed. REVIEW OF SYSTEMS:   see HPI/ Comprehensive review of systems negative except for the ones mentioned in HPI. PHYSICAL EXAMINATION:   /84 (Site: Left Upper Arm, Position: Sitting, Cuff Size: Medium Adult)   Pulse 72   Ht 5' 5\" (1.651 m)   Wt 192 lb 12.8 oz (87.5 kg)   SpO2 95%   BMI 32.08 kg/m²    GENERAL APPEARANCE:      Alert, oriented x 3, well developed, cooperative, not in any distress, appears stated age. HEAD:                         Normocephalic, atraumatic  EYES:                          PERRLA, EOMI, lids normal, conjuctivea clear, sclera anicteric. NECK:                         Supple, symmetrical,  trachea midline, no thyromegaly, no JVD, no lymphadenopathy. LUNGS:                       Clear to auscultation bilaterally, respirations unlabored, accessory muscles are not used. HEART:                       Regular rate and rhythm, S1 and S2 normal, no murmur, rub or gallop. PMI in MCL. ABDOMEN:                 Soft, non-tender, bowel sounds are normoactive, no masses, no hepatospleenomegaly. .  Patient is obese. EXTREMITY:              no bipedal edema  NEURO:                      Alert, oriented to person, place and time. Grossly intact. Musculoskeletal:         No kyphosis or scoliosis, mild tenderness in her knees. Skin:                            Warm and dry. No rash or obvious suspicious lesions. PSYCH:                       Mood euthymic, insight and judgement good. ASSESSMENT/PLAN:    1. Essential hypertension  Continue current medications, denies side effect with medicationss. Low salt diet and exercise advised. Continue Norvasc, Lopressor, clonidine and lisinopril and Hygroton. - metoprolol tartrate (LOPRESSOR) 25 MG tablet; TAKE ONE TABLET BY MOUTH EVERY MORNING AND TAKE ONE TABLET BY MOUTH EVERY NIGHT BEFORE BEDTIME  Dispense: 60 tablet;  Refill: 3  - amLODIPine (NORVASC) 10 MG tablet; Take 1 tablet by mouth daily  Dispense: 30 tablet; Refill: 3    2. Mixed hyperlipidemia. Advised low-cholesterol diet and exercise. Advised weight loss. Continue Lipitor      3. Type 2 diabetes mellitus with hyperglycemia, without long-term current use of insulin (HCC)  Continue Amaryl  Advised low sugar diet and exercise weight loss  - POCT Glucose  - glimepiride (AMARYL) 1 MG tablet; Take 1 tablet by mouth every morning  Dispense: 30 tablet; Refill: 3    4. History of ischemic stroke  Continue aspirin, Plavix and Lipitor  No significant respiratory symptoms. 5. Stage 4 chronic kidney disease ([facility] [address]  Advised to continue to follow with her nephrologist.  Avoid NSAID and keep well-hydrated. 6. Hypothyroidism due to acquired atrophy of thyroid  Continue Synthroid    7. Tobacco abuse  Patient has quit smoking since her last hospitalization May/June 2022.    8. Chronic depression  Advised to continue to follow with his psychiatrist and take psych medications regularly. 9. Arthritis of both knees  Continue Tylenol as needed    Advised to continue to follow COVID-19 precautions        Care discussed with patient. Questions answered and patient verbalizes understanding and agrees with plan. Medications reviewed and reconciled. Continue current medications. Appropriate prescriptions are ordered. Risks and benefits of meds are discussed. After visit summary provided. Advised to call for any problems, questions, or concerns. If symptoms worsen or don't improve as expected, to call us or go to ER. Follow up as directed, sooner if needed. Return in about 2 months (around 11/1/2022). This dictation was performed with a verbal recognition program and it was checked for errors. It is possible that there are still dictated errors within this office note. Any errors should be brought immediately to my attention for correction.  All efforts were made to ensure that this office note is accurate.      Jaimee Adkins MD MD

## 2022-09-02 ENCOUNTER — TELEPHONE (OUTPATIENT)
Dept: INTERNAL MEDICINE CLINIC | Age: 77
End: 2022-09-02

## 2022-09-02 NOTE — TELEPHONE ENCOUNTER
Pt's daughter calling to inform pt has developed hives. She c/o itchy rash. Carrie asking if pt can try Benadryl. Informed pt may try Benadryl, which may make pt sleepy. She should not drive, after taking. Call office, if no improvement within a few hrs. Carrie voiced understanding/thanks. No further action is needed, at this time.

## 2022-10-16 DIAGNOSIS — E03.4 HYPOTHYROIDISM DUE TO ACQUIRED ATROPHY OF THYROID: ICD-10-CM

## 2022-10-17 RX ORDER — CHLORTHALIDONE 25 MG/1
TABLET ORAL
Qty: 30 TABLET | Refills: 3 | Status: SHIPPED | OUTPATIENT
Start: 2022-10-17

## 2022-10-17 RX ORDER — LEVOTHYROXINE SODIUM 88 UG/1
TABLET ORAL
Qty: 30 TABLET | Refills: 3 | Status: SHIPPED | OUTPATIENT
Start: 2022-10-17

## 2022-11-01 ENCOUNTER — OFFICE VISIT (OUTPATIENT)
Dept: INTERNAL MEDICINE CLINIC | Age: 77
End: 2022-11-01
Payer: MEDICARE

## 2022-11-01 VITALS
HEIGHT: 65 IN | BODY MASS INDEX: 32.82 KG/M2 | WEIGHT: 197 LBS | SYSTOLIC BLOOD PRESSURE: 158 MMHG | DIASTOLIC BLOOD PRESSURE: 81 MMHG | OXYGEN SATURATION: 97 % | HEART RATE: 63 BPM

## 2022-11-01 DIAGNOSIS — M17.0 ARTHRITIS OF BOTH KNEES: ICD-10-CM

## 2022-11-01 DIAGNOSIS — I10 ESSENTIAL HYPERTENSION: Primary | ICD-10-CM

## 2022-11-01 DIAGNOSIS — Z72.0 TOBACCO ABUSE: ICD-10-CM

## 2022-11-01 DIAGNOSIS — F32.A CHRONIC DEPRESSION: ICD-10-CM

## 2022-11-01 DIAGNOSIS — E11.65 TYPE 2 DIABETES MELLITUS WITH HYPERGLYCEMIA, WITHOUT LONG-TERM CURRENT USE OF INSULIN (HCC): ICD-10-CM

## 2022-11-01 DIAGNOSIS — Z86.73 HISTORY OF ISCHEMIC STROKE: ICD-10-CM

## 2022-11-01 DIAGNOSIS — E78.2 MIXED HYPERLIPIDEMIA: ICD-10-CM

## 2022-11-01 DIAGNOSIS — E03.4 HYPOTHYROIDISM DUE TO ACQUIRED ATROPHY OF THYROID: ICD-10-CM

## 2022-11-01 DIAGNOSIS — F41.9 ANXIETY: ICD-10-CM

## 2022-11-01 DIAGNOSIS — N18.4 STAGE 4 CHRONIC KIDNEY DISEASE (HCC): ICD-10-CM

## 2022-11-01 LAB
CHP ED QC CHECK: NORMAL
GLUCOSE BLD-MCNC: 192 MG/DL

## 2022-11-01 PROCEDURE — 4004F PT TOBACCO SCREEN RCVD TLK: CPT | Performed by: INTERNAL MEDICINE

## 2022-11-01 PROCEDURE — 3074F SYST BP LT 130 MM HG: CPT | Performed by: INTERNAL MEDICINE

## 2022-11-01 PROCEDURE — G8400 PT W/DXA NO RESULTS DOC: HCPCS | Performed by: INTERNAL MEDICINE

## 2022-11-01 PROCEDURE — 3044F HG A1C LEVEL LT 7.0%: CPT | Performed by: INTERNAL MEDICINE

## 2022-11-01 PROCEDURE — 3078F DIAST BP <80 MM HG: CPT | Performed by: INTERNAL MEDICINE

## 2022-11-01 PROCEDURE — 1090F PRES/ABSN URINE INCON ASSESS: CPT | Performed by: INTERNAL MEDICINE

## 2022-11-01 PROCEDURE — G8484 FLU IMMUNIZE NO ADMIN: HCPCS | Performed by: INTERNAL MEDICINE

## 2022-11-01 PROCEDURE — G8427 DOCREV CUR MEDS BY ELIG CLIN: HCPCS | Performed by: INTERNAL MEDICINE

## 2022-11-01 PROCEDURE — G8417 CALC BMI ABV UP PARAM F/U: HCPCS | Performed by: INTERNAL MEDICINE

## 2022-11-01 PROCEDURE — 82962 GLUCOSE BLOOD TEST: CPT | Performed by: INTERNAL MEDICINE

## 2022-11-01 PROCEDURE — 1123F ACP DISCUSS/DSCN MKR DOCD: CPT | Performed by: INTERNAL MEDICINE

## 2022-11-01 PROCEDURE — 99214 OFFICE O/P EST MOD 30 MIN: CPT | Performed by: INTERNAL MEDICINE

## 2022-11-01 NOTE — PROGRESS NOTES
Name: Juan Lambert  [id number]  Age: 68 y.o. YOB: 1945  Sex: female    CHIEF COMPLAINT:    Chief Complaint   Patient presents with    Hypertension    Diabetes    Other     Other chronic conditions         HISTORY OF PRESENT ILLNESS:     This is a pleasant  68 y.o. female  is seen today for management of chronic medical problems and medications refills. Previous records reviewed . She apparently had a acute/subacute ischemic stroke  in May 2022. Recovered nicely. She is doing better. No significant weakness or dizziness except when she tries to get up fast.    Doing OK  otherwise. .  Denies CP or SOB. No fever , sore throat or cough or congestion. Denies any abdominal pain. Appetite OK. Bowels moving [facility]. No urinary symptoms. Still with significant pain in her knees. Takes Tylenol for it. Does not want to see an orthopedic doctor at this time. Hearing is ok. Vision Ok with glasses. Denies  any significant skin lesions. Depression and anxiety better with medications. Seeing a Psychiatrist periodically for her psych problems. She is checking her sugars regularly at home. Usually @ 130-160 . She has CKD , slightly worsening. Did see Dr. Trinh Jimenez yesterday and he recommended Jardiance and will DC Amaryl if possible and not expensive for  her. She is still smoking @ 1 PPD. She has Nicotine patches but has not started it yet. No other complaints. She is  vaccinated for COVID-19 x2. But has not taken booster dose yet. Did not get the Flu shot yet.     Past Medical History:    Patient Active Problem List   Diagnosis    Stage 4 chronic kidney disease ([facility] [address]    Essential hypertension    Hyperlipidemia    Hypothyroidism    Tobacco abuse    Chronic depression    RLS (restless legs syndrome)    Arthritis of both knees    Gait disturbance    Type 2 diabetes mellitus with hyperglycemia, without long-term current use of insulin (HCC)    Anxiety    History of ischemic stroke Past Surgical History:    History reviewed. No pertinent surgical history. Social History:   Social History     Tobacco Use    Smoking status: Every Day     Packs/day: 1.00     Years: 59.00     Pack years: 59.00     Types: Cigarettes     Start date:      Last attempt to quit: 2022     Years since quittin.4    Smokeless tobacco: Never   Substance Use Topics    Alcohol use: No       Family History:       Problem Relation Age of Onset    Diabetes Father     High Blood Pressure Father     Kidney Disease Father     Cancer Brother         unknown origin    Stroke Maternal Grandmother     Cancer Paternal Grandmother     Other Mother         hysterectomy    No Known Problems Brother        Allergies:  Seasonal    Current Medications :      Prior to Admission medications    Medication Sig Start Date End Date Taking? Authorizing Provider   busPIRone (BUSPAR) 15 MG tablet Take 15 mg by mouth 2 times daily   Yes Historical Provider, MD   senna (SENOKOT) 8.6 MG tablet Take 1 tablet by mouth daily   Yes Historical Provider, MD   empagliflozin (JARDIANCE) 10 MG tablet Take 1 tablet by mouth daily 10/31/22 11/30/22 Yes Katiana Díaz MD   chlorthalidone (HYGROTON) 25 MG tablet TAKE ONE TABLET BY MOUTH DAILY 10/17/22  Yes Stephon Yin MD   levothyroxine (SYNTHROID) 88 MCG tablet TAKE ONE TABLET BY MOUTH DAILY 10/17/22  Yes Stephon Yin MD   metoprolol tartrate (LOPRESSOR) 25 MG tablet TAKE ONE TABLET BY MOUTH EVERY MORNING AND TAKE ONE TABLET BY MOUTH EVERY NIGHT BEFORE BEDTIME 22  Yes Stephon Yin MD   amLODIPine (NORVASC) 10 MG tablet Take 1 tablet by mouth daily 22  Yes Stephon Yin MD   glimepiride (AMARYL) 1 MG tablet Take 1 tablet by mouth every morning 22  Yes Stephon Yin MD   atorvastatin (LIPITOR) 40 MG tablet Take 1 tablet by mouth daily TAKE ONE TABLET BY MOUTH DAILY 22  Yes Stephon Yin MD   clopidogrel (PLAVIX) 75 MG tablet Take 1 tablet by mouth in the morning.  22 Yes Charlotte Phalen, MD   cloNIDine (CATAPRES) 0.1 MG tablet Take 1 tablet by mouth in the morning and 1 tablet before bedtime. 8/4/22  Yes Charlotte Phalen, MD   lisinopril (PRINIVIL;ZESTRIL) 40 MG tablet Take 1 tablet by mouth daily 7/8/22  Yes Vero Patel MD   aspirin 81 MG chewable tablet Take 1 tablet by mouth daily 6/11/22  Yes JAMES Landon MD   LORazepam (ATIVAN) 1 MG tablet Take 1 mg by mouth every 8 hours as needed for Anxiety. Yes Historical Provider, MD   sertraline (ZOLOFT) 25 MG tablet Take 200 mg by mouth daily    Yes Historical Provider, MD       LAB DATA: Reviewed. REVIEW OF SYSTEMS:   see HPI/ Comprehensive review of systems negative except for the ones mentioned in HPI. PHYSICAL EXAMINATION:   BP (!) 158/81 (Site: Left Upper Arm, Position: Sitting, Cuff Size: Medium Adult)   Pulse 63   Ht 5' 5\" (1.651 m)   Wt 197 lb (89.4 kg)   SpO2 97%   BMI 32.78 kg/m²      GENERAL APPEARANCE:    Alert, oriented x 3, well developed, cooperative, not in any distress, appears stated age. HEAD:   Normocephalic, atraumatic   EYES:   PERRLA, EOMI, lids normal, conjuctivea clear, sclera anicteric. NECK:    Supple, symmetrical,  trachea midline, no thyromegaly, no JVD, no lymphadenopathy. LUNGS:    Clear to auscultation bilaterally, respirations unlabored, accessory muscles are not used. HEART:     Regular rate and rhythm, S1 and S2 normal, no murmur, rub or gallop. PMI in MCL. ABDOMEN:    Soft, non-tender, bowel sounds are normoactive, no masses, no hepatospleenomegaly. EXTREMITY:   1-2 + edema. NEURO:  Alert, oriented to person, place and time. Grossly intact. Musculoskeletal:         No kyphosis or scoliosis, no deformity in any extremity noted, muscle strength and tone are normal.  Skin:                            Warm and dry. No rash or obvious suspicious lesions. PSYCH:  Mood euthymic, insight and judgement good. ASSESSMENT/PLAN:    1.  Essential hypertension  Continue current medications, denies side effect with medicationss. Low salt diet and exercise advised. Continue Norvasc and Lopressor and lisinopril and Hygroton and Catapres    2. Mixed hyperlipidemia  Patient is taking cholesterol medications regularly. Denies any side effects. Diet and exercise advised. Continue Lipitor    3. Hypothyroidism due to acquired atrophy of thyroid  On Synthroid    4. Stage 4 chronic kidney disease (HCC)  Avoid NSAID and keep well-hydrated. Advised to continue to follow with her nephrologist as per his recommendations. He is starting her on Jardiance. 5. Type 2 diabetes mellitus with hyperglycemia, without long-term current use of insulin (HCC)  Advised low sugar diet and exercise. [place] is going to be started soon and Amaryl will be discontinued if patient can afford Jardiance. - POCT Glucose    6. History of ischemic stroke  Continue aspirin, Plavix and Lipitor    7 Tobacco abuse  Advised extensively to quit smoking. Patient will start using nicotine patches and will try to quit smoking    8. Arthritis of both knees  Continue Tylenol as needed. Patient will let us know if she wants orthopedic consult after thinking. 9. Anxiety  Advised to continue to follow with her psychiatrist and take psych medications regularly    10. Chronic depression  Advised to continue to follow with her psychiatrist and take psych medications regularly. Advised to continue to follow COVID-19 precautions      Care discussed with patient. Questions answered and patient verbalizes understanding and agrees with plan. Medications reviewed and reconciled. Continue current medications. Appropriate prescriptions are ordered. Risks and benefits of meds are discussed. After visit summary provided. Advised to call for any problems, questions, or concerns. If symptoms worsen or don't improve as expected, to call us or go to ER.     Follow up as directed, sooner if needed. Return in about 3 months (around 2/1/2023). This dictation was performed with a verbal recognition program and it was checked for errors. It is possible that there are still dictated errors within this office note. Any errors should be brought immediately to my attention for correction. All efforts were made to ensure that this office note is accurate.      Mireille Burgos MD MD

## 2022-12-06 DIAGNOSIS — I10 ESSENTIAL HYPERTENSION: ICD-10-CM

## 2022-12-06 RX ORDER — CLONIDINE HYDROCHLORIDE 0.1 MG/1
TABLET ORAL
Qty: 60 TABLET | Refills: 3 | Status: SHIPPED | OUTPATIENT
Start: 2022-12-06

## 2023-02-03 RX ORDER — CLOPIDOGREL BISULFATE 75 MG/1
TABLET ORAL
Qty: 90 TABLET | Refills: 1 | Status: SHIPPED | OUTPATIENT
Start: 2023-02-03

## 2023-02-05 DIAGNOSIS — I10 ESSENTIAL HYPERTENSION: ICD-10-CM

## 2023-02-06 RX ORDER — AMLODIPINE BESYLATE 10 MG/1
TABLET ORAL
Qty: 30 TABLET | Refills: 3 | Status: SHIPPED | OUTPATIENT
Start: 2023-02-06 | End: 2023-02-07 | Stop reason: SDUPTHER

## 2023-02-07 ENCOUNTER — OFFICE VISIT (OUTPATIENT)
Dept: INTERNAL MEDICINE CLINIC | Age: 78
End: 2023-02-07
Payer: MEDICARE

## 2023-02-07 VITALS
OXYGEN SATURATION: 94 % | DIASTOLIC BLOOD PRESSURE: 82 MMHG | SYSTOLIC BLOOD PRESSURE: 138 MMHG | HEART RATE: 72 BPM | HEIGHT: 65 IN | WEIGHT: 198 LBS | BODY MASS INDEX: 32.99 KG/M2

## 2023-02-07 DIAGNOSIS — Z72.0 TOBACCO ABUSE: ICD-10-CM

## 2023-02-07 DIAGNOSIS — E11.65 TYPE 2 DIABETES MELLITUS WITH HYPERGLYCEMIA, WITHOUT LONG-TERM CURRENT USE OF INSULIN (HCC): ICD-10-CM

## 2023-02-07 DIAGNOSIS — M17.0 ARTHRITIS OF BOTH KNEES: ICD-10-CM

## 2023-02-07 DIAGNOSIS — Z86.73 HISTORY OF ISCHEMIC STROKE: ICD-10-CM

## 2023-02-07 DIAGNOSIS — E03.4 HYPOTHYROIDISM DUE TO ACQUIRED ATROPHY OF THYROID: ICD-10-CM

## 2023-02-07 DIAGNOSIS — E78.2 MIXED HYPERLIPIDEMIA: ICD-10-CM

## 2023-02-07 DIAGNOSIS — I10 ESSENTIAL HYPERTENSION: Primary | ICD-10-CM

## 2023-02-07 DIAGNOSIS — F32.A CHRONIC DEPRESSION: ICD-10-CM

## 2023-02-07 DIAGNOSIS — F41.9 ANXIETY: ICD-10-CM

## 2023-02-07 DIAGNOSIS — N18.4 STAGE 4 CHRONIC KIDNEY DISEASE (HCC): ICD-10-CM

## 2023-02-07 LAB
CHP ED QC CHECK: NORMAL
GLUCOSE BLD-MCNC: 141 MG/DL

## 2023-02-07 PROCEDURE — 3079F DIAST BP 80-89 MM HG: CPT | Performed by: INTERNAL MEDICINE

## 2023-02-07 PROCEDURE — G8427 DOCREV CUR MEDS BY ELIG CLIN: HCPCS | Performed by: INTERNAL MEDICINE

## 2023-02-07 PROCEDURE — 1090F PRES/ABSN URINE INCON ASSESS: CPT | Performed by: INTERNAL MEDICINE

## 2023-02-07 PROCEDURE — 4004F PT TOBACCO SCREEN RCVD TLK: CPT | Performed by: INTERNAL MEDICINE

## 2023-02-07 PROCEDURE — G8417 CALC BMI ABV UP PARAM F/U: HCPCS | Performed by: INTERNAL MEDICINE

## 2023-02-07 PROCEDURE — G8484 FLU IMMUNIZE NO ADMIN: HCPCS | Performed by: INTERNAL MEDICINE

## 2023-02-07 PROCEDURE — 99214 OFFICE O/P EST MOD 30 MIN: CPT | Performed by: INTERNAL MEDICINE

## 2023-02-07 PROCEDURE — G8400 PT W/DXA NO RESULTS DOC: HCPCS | Performed by: INTERNAL MEDICINE

## 2023-02-07 PROCEDURE — 1123F ACP DISCUSS/DSCN MKR DOCD: CPT | Performed by: INTERNAL MEDICINE

## 2023-02-07 PROCEDURE — 82962 GLUCOSE BLOOD TEST: CPT | Performed by: INTERNAL MEDICINE

## 2023-02-07 PROCEDURE — 3075F SYST BP GE 130 - 139MM HG: CPT | Performed by: INTERNAL MEDICINE

## 2023-02-07 RX ORDER — BUSPIRONE HYDROCHLORIDE 10 MG/1
20 TABLET ORAL 2 TIMES DAILY
COMMUNITY

## 2023-02-07 RX ORDER — ACETAMINOPHEN 500 MG
500 TABLET ORAL EVERY 8 HOURS
COMMUNITY

## 2023-02-07 RX ORDER — CHLORTHALIDONE 25 MG/1
TABLET ORAL
Qty: 30 TABLET | Refills: 3 | Status: SHIPPED | OUTPATIENT
Start: 2023-02-07

## 2023-02-07 RX ORDER — LEVOTHYROXINE SODIUM 88 UG/1
TABLET ORAL
Qty: 30 TABLET | Refills: 3 | Status: SHIPPED | OUTPATIENT
Start: 2023-02-07

## 2023-02-07 RX ORDER — RISPERIDONE 0.5 MG/1
0.5 TABLET ORAL NIGHTLY
COMMUNITY

## 2023-02-07 RX ORDER — AMLODIPINE BESYLATE 10 MG/1
TABLET ORAL
Qty: 30 TABLET | Refills: 3 | Status: SHIPPED | OUTPATIENT
Start: 2023-02-07

## 2023-02-07 RX ORDER — GLIMEPIRIDE 1 MG/1
1 TABLET ORAL EVERY MORNING
Qty: 30 TABLET | Refills: 3 | Status: SHIPPED | OUTPATIENT
Start: 2023-02-07

## 2023-02-07 SDOH — ECONOMIC STABILITY: FOOD INSECURITY: WITHIN THE PAST 12 MONTHS, THE FOOD YOU BOUGHT JUST DIDN'T LAST AND YOU DIDN'T HAVE MONEY TO GET MORE.: SOMETIMES TRUE

## 2023-02-07 SDOH — ECONOMIC STABILITY: FOOD INSECURITY: WITHIN THE PAST 12 MONTHS, YOU WORRIED THAT YOUR FOOD WOULD RUN OUT BEFORE YOU GOT MONEY TO BUY MORE.: SOMETIMES TRUE

## 2023-02-07 SDOH — ECONOMIC STABILITY: INCOME INSECURITY: HOW HARD IS IT FOR YOU TO PAY FOR THE VERY BASICS LIKE FOOD, HOUSING, MEDICAL CARE, AND HEATING?: NOT VERY HARD

## 2023-02-07 SDOH — ECONOMIC STABILITY: HOUSING INSECURITY
IN THE LAST 12 MONTHS, WAS THERE A TIME WHEN YOU DID NOT HAVE A STEADY PLACE TO SLEEP OR SLEPT IN A SHELTER (INCLUDING NOW)?: NO

## 2023-02-07 ASSESSMENT — PATIENT HEALTH QUESTIONNAIRE - PHQ9
8. MOVING OR SPEAKING SO SLOWLY THAT OTHER PEOPLE COULD HAVE NOTICED. OR THE OPPOSITE, BEING SO FIGETY OR RESTLESS THAT YOU HAVE BEEN MOVING AROUND A LOT MORE THAN USUAL: 2
1. LITTLE INTEREST OR PLEASURE IN DOING THINGS: 3
6. FEELING BAD ABOUT YOURSELF - OR THAT YOU ARE A FAILURE OR HAVE LET YOURSELF OR YOUR FAMILY DOWN: 1
SUM OF ALL RESPONSES TO PHQ QUESTIONS 1-9: 15
9. THOUGHTS THAT YOU WOULD BE BETTER OFF DEAD, OR OF HURTING YOURSELF: 0
5. POOR APPETITE OR OVEREATING: 3
SUM OF ALL RESPONSES TO PHQ QUESTIONS 1-9: 15
4. FEELING TIRED OR HAVING LITTLE ENERGY: 2
SUM OF ALL RESPONSES TO PHQ9 QUESTIONS 1 & 2: 6
2. FEELING DOWN, DEPRESSED OR HOPELESS: 3
3. TROUBLE FALLING OR STAYING ASLEEP: 1
7. TROUBLE CONCENTRATING ON THINGS, SUCH AS READING THE NEWSPAPER OR WATCHING TELEVISION: 0
10. IF YOU CHECKED OFF ANY PROBLEMS, HOW DIFFICULT HAVE THESE PROBLEMS MADE IT FOR YOU TO DO YOUR WORK, TAKE CARE OF THINGS AT HOME, OR GET ALONG WITH OTHER PEOPLE: 2

## 2023-02-07 NOTE — PROGRESS NOTES
Name: Marge Martinez  [id number]  Age: 68 y.o. YOB: 1945  Sex: female    CHIEF COMPLAINT:    Chief Complaint   Patient presents with    Hypertension    Diabetes    Other     Other chronic conditions         HISTORY OF PRESENT ILLNESS:     This is a pleasant  68 y.o. female  is seen today for management of chronic medical problems and medications refills. Previous records reviewed . She apparently had a acute/subacute ischemic stroke  in May 2022. Recovered nicely. She is doing better. No significant weakness or dizziness except when she tries to get up fast.     Doing OK  otherwise. .  Denies CP or SOB. No fever , sore throat or cough or congestion. Denies any abdominal pain. Appetite OK. Bowels moving [place]. No urinary symptoms. Still with significant pain in her knees. Takes Tylenol for it. And also uses Voltaren gel. Does not want to see an orthopedic doctor at this time. Hearing is ok. Vision Ok with glasses. Denies  any significant skin lesions. Depression and anxiety better with medications. Seeing a Psychiatrist periodically for her psych problems. She is checking her sugars regularly at home. Usually @ 130-160 . She has CKD , slightly worsening. Dr. Marylin Weeks sees her periodically for her CKD. She is still smoking @ 1 PPD. Going to restart using Nicotine patches soon. No other complaints. She is  vaccinated for COVID-19 x2. But has not taken booster dose yet. Did not get the Flu shot yet.     Past Medical History:    Patient Active Problem List   Diagnosis    Stage 4 chronic kidney disease ([address]    Essential hypertension    Hyperlipidemia    Hypothyroidism    Tobacco abuse    Chronic depression    RLS (restless legs syndrome)    Arthritis of both knees    Gait disturbance    Type 2 diabetes mellitus with hyperglycemia, without long-term current use of insulin (HCC)    Anxiety    History of ischemic stroke        Past Surgical History:    History reviewed. No pertinent surgical history. Social History:   Social History     Tobacco Use    Smoking status: Every Day     Packs/day: 1.00     Years: 59.00     Pack years: 59.00     Types: Cigarettes     Start date:      Last attempt to quit: 2022     Years since quittin.6    Smokeless tobacco: Never   Substance Use Topics    Alcohol use: No       Family History:       Problem Relation Age of Onset    Diabetes Father     High Blood Pressure Father     Kidney Disease Father     Cancer Brother         unknown origin    Stroke Maternal Grandmother     Cancer Paternal Grandmother     Other Mother         hysterectomy    No Known Problems Brother        Allergies:  Seasonal    Current Medications :      Prior to Admission medications    Medication Sig Start Date End Date Taking? Authorizing Provider   acetaminophen (TYLENOL) 500 MG tablet Take 500 mg by mouth every 8 hours   Yes Historical Provider, MD   risperiDONE (RISPERDAL) 0.5 MG tablet Take 0.5 mg by mouth nightly   Yes Historical Provider, MD   busPIRone (BUSPAR) 10 MG tablet Take 20 mg by mouth in the morning and 20 mg in the evening.    Yes Historical Provider, MD   metoprolol tartrate (LOPRESSOR) 25 MG tablet TAKE ONE TABLET BY MOUTH EVERY MORNING AND TAKE ONE TABLET BY MOUTH EVERY NIGHT AT BEDTIME 23  Yes Kay Fuentes MD   glimepiride (AMARYL) 1 MG tablet Take 1 tablet by mouth every morning 23  Yes Kay Fuentes MD   levothyroxine (SYNTHROID) 88 MCG tablet TAKE ONE TABLET BY MOUTH DAILY 23  Yes Kay Fuentes MD   amLODIPine (NORVASC) 10 MG tablet TAKE ONE TABLET BY MOUTH DAILY 23  Yes Kay Fuentes MD   chlorthalidone (HYGROTON) 25 MG tablet TAKE ONE TABLET BY MOUTH DAILY 23  Yes Kay Fuentes MD   clopidogrel (PLAVIX) 75 MG tablet TAKE ONE TABLET BY MOUTH IN THE MORNING 2/3/23  Yes Kay Fuentes MD   lisinopril (PRINIVIL;ZESTRIL) 40 MG tablet Take 1 tablet by mouth daily 23  Yes Roberto Rowe MD   cloNIDine (CATAPRES) 0.1 MG tablet TAKE ONE TABLET BY MOUTH EVERY MORNING AND TAKE ONE TABLET BY MOUTH EVERY NIGHT BEFORE BEDTIME 12/6/22  Yes Lamont Horton MD   empagliflozin (JARDIANCE) 10 MG tablet Take 1 tablet by mouth daily 10/31/22 2/7/23 Yes Jesus Moctezuma MD   atorvastatin (LIPITOR) 40 MG tablet Take 1 tablet by mouth daily TAKE ONE TABLET BY MOUTH DAILY 9/1/22  Yes Lamont Horton MD   aspirin 81 MG chewable tablet Take 1 tablet by mouth daily 6/11/22  Yes JAMES Sosa MD   LORazepam (ATIVAN) 1 MG tablet Take 1 mg by mouth in the morning and 1 mg in the evening. Yes Historical Provider, MD   sertraline (ZOLOFT) 25 MG tablet Take 200 mg by mouth daily    Yes Historical Provider, MD       LAB DATA: Reviewed. REVIEW OF SYSTEMS:   see HPI/ Comprehensive review of systems negative except for the ones mentioned in HPI. PHYSICAL EXAMINATION:   /82 (Site: Right Upper Arm, Position: Sitting, Cuff Size: Medium Adult)   Pulse 72   Ht 5' 5\" (1.651 m)   Wt 198 lb (89.8 kg)   SpO2 94%   BMI 32.95 kg/m²        GENERAL APPEARANCE:      Alert, oriented x 3, well developed, cooperative, not in any distress, appears stated age. HEAD:                         Normocephalic, atraumatic   EYES:                          PERRLA, EOMI, lids normal, conjuctivea clear, sclera anicteric. NECK:                         Supple, symmetrical,  trachea midline, no thyromegaly, no JVD, no lymphadenopathy. LUNGS:                       Clear to auscultation bilaterally, respirations unlabored, accessory muscles are not used. HEART:                       Regular rate and rhythm, S1 and S2 normal, no murmur, rub or gallop. PMI in MCL. ABDOMEN:                 Soft, non-tender, bowel sounds are normoactive, no masses, no hepatospleenomegaly. EXTREMITY:              1-2 + edema. NEURO:                      Alert, oriented to person, place and time. Grossly intact.   Musculoskeletal: No kyphosis or scoliosis, no deformity in any extremity noted, muscle strength and tone are normal.  Skin:                            Warm and dry. No rash or obvious suspicious lesions. PSYCH:                       Mood euthymic, insight and judgement good. ASSESSMENT/PLAN:    1. Essential hypertension  Continue current medications, denies side effect with medicationss. Low salt diet and exercise advised. Continue Norvasc and Lopressor and [place] on and Catapres and lisinopril. Advised to continue to follow with her nephrologist as per his recommendations  - metoprolol tartrate (LOPRESSOR) 25 MG tablet; TAKE ONE TABLET BY MOUTH EVERY MORNING AND TAKE ONE TABLET BY MOUTH EVERY NIGHT AT BEDTIME  Dispense: 60 tablet; Refill: 3  - amLODIPine (NORVASC) 10 MG tablet; TAKE ONE TABLET BY MOUTH DAILY  Dispense: 30 tablet; Refill: 3  - chlorthalidone (HYGROTON) 25 MG tablet; TAKE ONE TABLET BY MOUTH DAILY  Dispense: 30 tablet; Refill: 3  - Comprehensive Metabolic Panel; Future  - CBC with Auto Differential; Future    2. Mixed hyperlipidemia  Patient is taking cholesterol medications regularly. Denies any side effects. Diet and exercise advised. Continue Lipitor  - Lipid, Fasting; Future    3. Hypothyroidism due to acquired atrophy of thyroid  Continue Synthroid  - levothyroxine (SYNTHROID) 88 MCG tablet; TAKE ONE TABLET BY MOUTH DAILY  Dispense: 30 tablet; Refill: 3  - TSH; Future    4. Type 2 diabetes mellitus with hyperglycemia, without long-term current use of insulin (HCC)  Patient on Amaryl, Jardiance. Advised low sugar diet and exercise  - POCT Glucose  - glimepiride (AMARYL) 1 MG tablet; Take 1 tablet by mouth every morning  Dispense: 30 tablet; Refill: 3  - Hemoglobin A1C; Future    5. Stage 4 chronic kidney disease ([facility] [address]  Advised to avoid NSAID and keep well-hydrated and keep following with nephrologist    6. History of ischemic stroke  Continue aspirin and Lipitor    7. Tobacco abuse  Advised extensively to quit smoking. Patient will restart using nicotine patches and try to quit smoking    8. Chronic depression  Advised to continue to follow with her psychiatrist and take psych medications as prescribed by her. 9. Anxiety  As mentioned above. 10. Arthritis of both knees  Advised take Tylenol as needed. Also uses Voltaren gel. Advised to continue to follow COVID-19 precautions        Care discussed with patient. Questions answered and patient verbalizes understanding and agrees with plan. Medications reviewed and reconciled. Continue current medications. Appropriate prescriptions are ordered. Risks and benefits of meds are discussed. After visit summary provided. Advised to call for any problems, questions, or concerns. If symptoms worsen or don't improve as expected, to call us or go to ER. Follow up as directed, sooner if needed. No follow-ups on file. This dictation was performed with a verbal recognition program and it was checked for errors. It is possible that there are still dictated errors within this office note. Any errors should be brought immediately to my attention for correction. All efforts were made to ensure that this office note is accurate.      Charity Del Rosario MD MD

## 2023-03-16 ENCOUNTER — OFFICE VISIT (OUTPATIENT)
Dept: INTERNAL MEDICINE CLINIC | Age: 78
End: 2023-03-16
Payer: MEDICARE

## 2023-03-16 VITALS
WEIGHT: 197.8 LBS | HEART RATE: 66 BPM | HEIGHT: 65 IN | SYSTOLIC BLOOD PRESSURE: 163 MMHG | OXYGEN SATURATION: 96 % | DIASTOLIC BLOOD PRESSURE: 85 MMHG | BODY MASS INDEX: 32.96 KG/M2

## 2023-03-16 DIAGNOSIS — E03.4 HYPOTHYROIDISM DUE TO ACQUIRED ATROPHY OF THYROID: ICD-10-CM

## 2023-03-16 DIAGNOSIS — Z72.0 TOBACCO ABUSE: ICD-10-CM

## 2023-03-16 DIAGNOSIS — N18.4 STAGE 4 CHRONIC KIDNEY DISEASE (HCC): ICD-10-CM

## 2023-03-16 DIAGNOSIS — Z86.73 HISTORY OF ISCHEMIC STROKE: ICD-10-CM

## 2023-03-16 DIAGNOSIS — F41.9 ANXIETY: ICD-10-CM

## 2023-03-16 DIAGNOSIS — M17.0 ARTHRITIS OF BOTH KNEES: ICD-10-CM

## 2023-03-16 DIAGNOSIS — I10 ESSENTIAL HYPERTENSION: ICD-10-CM

## 2023-03-16 DIAGNOSIS — F32.A CHRONIC DEPRESSION: ICD-10-CM

## 2023-03-16 DIAGNOSIS — E78.2 MIXED HYPERLIPIDEMIA: Primary | ICD-10-CM

## 2023-03-16 DIAGNOSIS — E11.65 TYPE 2 DIABETES MELLITUS WITH HYPERGLYCEMIA, WITHOUT LONG-TERM CURRENT USE OF INSULIN (HCC): ICD-10-CM

## 2023-03-16 PROCEDURE — G8484 FLU IMMUNIZE NO ADMIN: HCPCS | Performed by: INTERNAL MEDICINE

## 2023-03-16 PROCEDURE — G8417 CALC BMI ABV UP PARAM F/U: HCPCS | Performed by: INTERNAL MEDICINE

## 2023-03-16 PROCEDURE — 1123F ACP DISCUSS/DSCN MKR DOCD: CPT | Performed by: INTERNAL MEDICINE

## 2023-03-16 PROCEDURE — 1090F PRES/ABSN URINE INCON ASSESS: CPT | Performed by: INTERNAL MEDICINE

## 2023-03-16 PROCEDURE — G8427 DOCREV CUR MEDS BY ELIG CLIN: HCPCS | Performed by: INTERNAL MEDICINE

## 2023-03-16 PROCEDURE — 4004F PT TOBACCO SCREEN RCVD TLK: CPT | Performed by: INTERNAL MEDICINE

## 2023-03-16 PROCEDURE — 3074F SYST BP LT 130 MM HG: CPT | Performed by: INTERNAL MEDICINE

## 2023-03-16 PROCEDURE — 3078F DIAST BP <80 MM HG: CPT | Performed by: INTERNAL MEDICINE

## 2023-03-16 PROCEDURE — 99214 OFFICE O/P EST MOD 30 MIN: CPT | Performed by: INTERNAL MEDICINE

## 2023-03-16 PROCEDURE — G8400 PT W/DXA NO RESULTS DOC: HCPCS | Performed by: INTERNAL MEDICINE

## 2023-03-16 RX ORDER — QUETIAPINE FUMARATE 25 MG/1
25 TABLET, FILM COATED ORAL NIGHTLY
COMMUNITY

## 2023-03-16 RX ORDER — HYDROXYZINE HYDROCHLORIDE 25 MG/1
25 TABLET, FILM COATED ORAL 3 TIMES DAILY PRN
COMMUNITY

## 2023-03-16 RX ORDER — BUSPIRONE HYDROCHLORIDE 15 MG/1
15 TABLET ORAL 3 TIMES DAILY
COMMUNITY

## 2023-03-16 RX ORDER — CLONIDINE HYDROCHLORIDE 0.2 MG/1
0.2 TABLET ORAL 2 TIMES DAILY
Qty: 30 TABLET | Refills: 3 | Status: SHIPPED | OUTPATIENT
Start: 2023-03-16

## 2023-03-16 NOTE — PROGRESS NOTES
Name: Jocelynn Sterling  [id number]  Age: 68 y.o. YOB: 1945  Sex: female    CHIEF COMPLAINT:    Chief Complaint   Patient presents with    Hypertension     Been running high. Mental health called and was very concerned    Other     Other chronic conditions         HISTORY OF PRESENT ILLNESS:     This is a pleasant  68 y.o. female  is seen today for management of chronic medical problems and medications refills. Previous records reviewed . C/O BP running high recently. She claims that her psych medications  are being adjusted by her psychiatrist and she is unable to tolerate it. She is having severe anxiety and panic attacks since her Ativan discontinued and Vistaril started. . She thinks She is allergic to vistaril. She is going to see her psychiatrist soon for further recommendations      She apparently had a acute/subacute ischemic stroke  in May 2022. Recovered nicely. She is doing better. No significant weakness or dizziness except when she tries to get up fast.     Doing OK  otherwise. .  Denies CP or SOB. No fever , sore throat or cough or congestion. Denies any abdominal pain. Appetite OK. Bowels moving [place]. No urinary symptoms. Still with significant pain in her knees. Takes Tylenol for it. And also uses Voltaren gel. Does not want to see an orthopedic doctor at this time. Hearing is ok. Vision Ok with glasses. Denies  any significant skin lesions. She is checking her sugars regularly at home. Usually @ 130-160 . Dr. Kori Sanchez sees her periodically for her CKD. She is still smoking @ 1 PPD. Going to restart using Nicotine patches soon. No other new complaints. She was  vaccinated for COVID-19 x2. But has not taken booster dose yet. Did not get the Flu shot yet.   Labs from 8 2/4/2022 were reviewed and explained to the patient    Past Medical History:    Patient Active Problem List   Diagnosis    Stage 4 chronic kidney disease ([facility] [address]    Essential hypertension    Hyperlipidemia    Hypothyroidism    Tobacco abuse    Chronic depression    RLS (restless legs syndrome)    Arthritis of both knees    Gait disturbance    Type 2 diabetes mellitus with hyperglycemia, without long-term current use of insulin (HCC)    Anxiety    History of ischemic stroke        Past Surgical History:    History reviewed. No pertinent surgical history. Social History:   Social History     Tobacco Use    Smoking status: Every Day     Packs/day: 1.00     Years: 59.00     Pack years: 59.00     Types: Cigarettes     Start date:      Last attempt to quit: 2022     Years since quittin.7    Smokeless tobacco: Never   Substance Use Topics    Alcohol use: No       Family History:       Problem Relation Age of Onset    Diabetes Father     High Blood Pressure Father     Kidney Disease Father     Cancer Brother         unknown origin    Stroke Maternal Grandmother     Cancer Paternal Grandmother     Other Mother         hysterectomy    No Known Problems Brother        Allergies:  Seasonal    Current Medications :      Prior to Admission medications    Medication Sig Start Date End Date Taking?  Authorizing Provider   busPIRone (BUSPAR) 15 MG tablet Take 15 mg by mouth 3 times daily   Yes Historical Provider, MD   hydrOXYzine HCl (ATARAX) 25 MG tablet Take 25 mg by mouth 3 times daily as needed for Anxiety   Yes Historical Provider, MD   QUEtiapine (SEROQUEL) 25 MG tablet Take 25 mg by mouth nightly   Yes Historical Provider, MD   cloNIDine (CATAPRES) 0.2 MG tablet Take 1 tablet by mouth 2 times daily 3/16/23  Yes Charlotte Phalen, MD   acetaminophen (TYLENOL) 500 MG tablet Take 500 mg by mouth every 8 hours   Yes Historical Provider, MD   metoprolol tartrate (LOPRESSOR) 25 MG tablet TAKE ONE TABLET BY MOUTH EVERY MORNING AND TAKE ONE TABLET BY MOUTH EVERY NIGHT AT BEDTIME 23  Yes Charlotte Phalen, MD   glimepiride (AMARYL) 1 MG tablet Take 1 tablet by mouth every morning 23  Yes Panchito Menjivar MD   levothyroxine (SYNTHROID) 88 MCG tablet TAKE ONE TABLET BY MOUTH DAILY 2/7/23  Yes Panchito Menjivar MD   amLODIPine (NORVASC) 10 MG tablet TAKE ONE TABLET BY MOUTH DAILY 2/7/23  Yes Panchito Menjivar MD   chlorthalidone (HYGROTON) 25 MG tablet TAKE ONE TABLET BY MOUTH DAILY 2/7/23  Yes Panchito Menjivar MD   clopidogrel (PLAVIX) 75 MG tablet TAKE ONE TABLET BY MOUTH IN THE MORNING 2/3/23  Yes Panchito Menjivar MD   lisinopril (PRINIVIL;ZESTRIL) 40 MG tablet Take 1 tablet by mouth daily 1/4/23  Yes Andrew Rendon MD   empagliflozin (JARDIANCE) 10 MG tablet Take 1 tablet by mouth daily 10/31/22 3/16/23 Yes Andrew Rendon MD   atorvastatin (LIPITOR) 40 MG tablet Take 1 tablet by mouth daily TAKE ONE TABLET BY MOUTH DAILY 9/1/22  Yes Panchito Menjivar MD   aspirin 81 MG chewable tablet Take 1 tablet by mouth daily 6/11/22  Yes JAMES Javed MD   LORazepam (ATIVAN) 1 MG tablet Take 1 mg by mouth in the morning and 1 mg in the evening.   Yes Historical Provider, MD   sertraline (ZOLOFT) 25 MG tablet Take 200 mg by mouth daily    Yes Historical Provider, MD       LAB DATA: Reviewed.    REVIEW OF SYSTEMS:   see HPI/ Comprehensive review of systems negative except for the ones mentioned in HPI.    PHYSICAL EXAMINATION:   BP (!) 163/85 (Site: Left Upper Arm, Position: Sitting, Cuff Size: Medium Adult)   Pulse 66   Ht 5' 5\" (1.651 m)   Wt 197 lb 12.8 oz (89.7 kg)   SpO2 96%   BMI 32.92 kg/m²        GENERAL APPEARANCE:      Alert, oriented x 3, well developed, cooperative, not in any distress, appears stated age.  HEAD:                         Normocephalic, atraumatic   EYES:                          PERRLA, EOMI, lids normal, conjuctivea clear, sclera anicteric.   NECK:                         Supple, symmetrical,  trachea midline, no thyromegaly, no JVD, no lymphadenopathy.    LUNGS:                       Clear to auscultation bilaterally, respirations unlabored, accessory muscles are not used.  HEART:  Regular rate and rhythm, S1 and S2 normal, no murmur, rub or gallop. PMI in MCL. ABDOMEN:                 Soft, non-tender, bowel sounds are normoactive, no masses, no hepatospleenomegaly. EXTREMITY:              1 + edema. NEURO:                      Alert, oriented to person, place and time. Grossly intact. Musculoskeletal:         No kyphosis or scoliosis, no deformity in any extremity noted, muscle strength and tone are normal.  Skin:                            Warm and dry. No rash or obvious suspicious lesions. PSYCH:                       Patient appears anxious. ASSESSMENT/PLAN:    1. Essential hypertension  Continue current medications, denies side effect with medicationss. Low salt diet and exercise advised. Continue Lopressor and Norvasc and Hydratome and will increase clonidine  to 0.2 mg twice daily. Advised to keep appointment with nephrology to discuss about it  - cloNIDine (CATAPRES) 0.2 MG tablet; Take 1 tablet by mouth 2 times daily  Dispense: 30 tablet; Refill: 3    2. Mixed hyperlipidemia  Patient is taking cholesterol medications regularly. Denies any side effects. Diet and exercise advised. Continue Lipitor    3. Hypothyroidism due to acquired atrophy of thyroid  Continue Synthroid      4. History of ischemic stroke  Continue aspirin and Lipitor    5. Stage 4 chronic kidney disease (HCC)  Avoid NSAID and keep well-hydrated and keep following with her nephrologist    6. Chronic depression  Advised to continue to follow with her psychiatrist and take medication prescribed by them    7. Anxiety  As mentioned above    8. Arthritis of both knees  Advised take Tylenol as needed. Also on Voltaren gel    9. Type 2 diabetes mellitus with hyperglycemia, without long-term current use of insulin (HCC)  Continue Amaryl, Jardiance and advised low sugar diet and exercise.     10. Tobacco abuse  Advised extensively to quit smoking. Advised to continue to follow COVID-19 precautions    Care discussed with patient. Questions answered and patient verbalizes understanding and agrees with plan. Medications reviewed and reconciled. Continue current medications. Appropriate prescriptions are ordered. Risks and benefits of meds are discussed. After visit summary provided. Advised to call for any problems, questions, or concerns. If symptoms worsen or don't improve as expected, to call us or go to ER. Follow up as directed, sooner if needed. No follow-ups on file. This dictation was performed with a verbal recognition program and it was checked for errors. It is possible that there are still dictated errors within this office note. Any errors should be brought immediately to my attention for correction. All efforts were made to ensure that this office note is accurate.      Kallie Dey MD MD

## 2023-03-20 ENCOUNTER — HOSPITAL ENCOUNTER (EMERGENCY)
Age: 78
Discharge: HOME | End: 2023-03-20
Payer: MEDICARE

## 2023-03-20 ENCOUNTER — APPOINTMENT (OUTPATIENT)
Dept: GENERAL RADIOLOGY | Age: 78
End: 2023-03-20
Payer: MEDICARE

## 2023-03-20 VITALS
SYSTOLIC BLOOD PRESSURE: 158 MMHG | DIASTOLIC BLOOD PRESSURE: 84 MMHG | RESPIRATION RATE: 16 BRPM | TEMPERATURE: 97.8 F | HEART RATE: 65 BPM | OXYGEN SATURATION: 97 %

## 2023-03-20 DIAGNOSIS — F41.1 ANXIETY STATE: Primary | ICD-10-CM

## 2023-03-20 LAB
ALBUMIN SERPL-MCNC: 4.2 GM/DL (ref 3.4–5)
ALCOHOL SCREEN SERUM: <0.01 %WT/VOL
ALP BLD-CCNC: 101 IU/L (ref 40–128)
ALT SERPL-CCNC: 14 U/L (ref 10–40)
AMPHETAMINES: NEGATIVE
ANION GAP SERPL CALCULATED.3IONS-SCNC: 12 MMOL/L (ref 4–16)
AST SERPL-CCNC: 15 IU/L (ref 15–37)
BACTERIA: NEGATIVE /HPF
BARBITURATE SCREEN URINE: NEGATIVE
BASOPHILS ABSOLUTE: 0.1 K/CU MM
BASOPHILS RELATIVE PERCENT: 0.7 % (ref 0–1)
BENZODIAZEPINE SCREEN, URINE: NEGATIVE
BILIRUB SERPL-MCNC: 0.5 MG/DL (ref 0–1)
BILIRUBIN URINE: NEGATIVE MG/DL
BLOOD, URINE: ABNORMAL
BUN SERPL-MCNC: 35 MG/DL (ref 6–23)
CALCIUM SERPL-MCNC: 9.1 MG/DL (ref 8.3–10.6)
CANNABINOID SCREEN URINE: NEGATIVE
CHLORIDE BLD-SCNC: 97 MMOL/L (ref 99–110)
CLARITY: CLEAR
CO2: 25 MMOL/L (ref 21–32)
COCAINE METABOLITE: NEGATIVE
COLOR: YELLOW
CREAT SERPL-MCNC: 1.6 MG/DL (ref 0.6–1.1)
DIFFERENTIAL TYPE: ABNORMAL
EKG ATRIAL RATE: 53 BPM
EKG DIAGNOSIS: NORMAL
EKG P AXIS: 82 DEGREES
EKG P-R INTERVAL: 192 MS
EKG Q-T INTERVAL: 502 MS
EKG QRS DURATION: 146 MS
EKG QTC CALCULATION (BAZETT): 471 MS
EKG R AXIS: -54 DEGREES
EKG T AXIS: 12 DEGREES
EKG VENTRICULAR RATE: 53 BPM
EOSINOPHILS ABSOLUTE: 0.1 K/CU MM
EOSINOPHILS RELATIVE PERCENT: 1.2 % (ref 0–3)
GFR SERPL CREATININE-BSD FRML MDRD: 33 ML/MIN/1.73M2
GLUCOSE SERPL-MCNC: 151 MG/DL (ref 70–99)
GLUCOSE, URINE: >1000 MG/DL
HCT VFR BLD CALC: 53.1 % (ref 37–47)
HEMOGLOBIN: 17.7 GM/DL (ref 12.5–16)
IMMATURE NEUTROPHIL %: 0.5 % (ref 0–0.43)
KETONES, URINE: NEGATIVE MG/DL
LEUKOCYTE ESTERASE, URINE: NEGATIVE
LYMPHOCYTES ABSOLUTE: 1.1 K/CU MM
LYMPHOCYTES RELATIVE PERCENT: 14 % (ref 24–44)
MCH RBC QN AUTO: 31.3 PG (ref 27–31)
MCHC RBC AUTO-ENTMCNC: 33.3 % (ref 32–36)
MCV RBC AUTO: 94 FL (ref 78–100)
MONOCYTES ABSOLUTE: 0.4 K/CU MM
MONOCYTES RELATIVE PERCENT: 5 % (ref 0–4)
MUCUS: ABNORMAL HPF
NITRITE URINE, QUANTITATIVE: NEGATIVE
NUCLEATED RBC %: 0 %
OPIATES, URINE: NEGATIVE
OXYCODONE: NEGATIVE
PDW BLD-RTO: 14.5 % (ref 11.7–14.9)
PH, URINE: 5.5 (ref 5–8)
PHENCYCLIDINE, URINE: NEGATIVE
PLATELET # BLD: 155 K/CU MM (ref 140–440)
PMV BLD AUTO: 10.2 FL (ref 7.5–11.1)
POTASSIUM SERPL-SCNC: 3.3 MMOL/L (ref 3.5–5.1)
PROTEIN UA: NEGATIVE MG/DL
RBC # BLD: 5.65 M/CU MM (ref 4.2–5.4)
RBC URINE: ABNORMAL /HPF (ref 0–6)
SEGMENTED NEUTROPHILS ABSOLUTE COUNT: 6 K/CU MM
SEGMENTED NEUTROPHILS RELATIVE PERCENT: 78.6 % (ref 36–66)
SODIUM BLD-SCNC: 134 MMOL/L (ref 135–145)
SPECIFIC GRAVITY UA: <1.005 (ref 1–1.03)
SQUAMOUS EPITHELIAL: 2 /HPF
TOTAL IMMATURE NEUTOROPHIL: 0.04 K/CU MM
TOTAL NUCLEATED RBC: 0 K/CU MM
TOTAL PROTEIN: 6.9 GM/DL (ref 6.4–8.2)
TRICHOMONAS: ABNORMAL /HPF
TROPONIN T: <0.01 NG/ML
UROBILINOGEN, URINE: 0.2 MG/DL (ref 0.2–1)
WBC # BLD: 7.6 K/CU MM (ref 4–10.5)
WBC UA: <1 /HPF (ref 0–5)

## 2023-03-20 PROCEDURE — 93010 ELECTROCARDIOGRAM REPORT: CPT | Performed by: INTERNAL MEDICINE

## 2023-03-20 PROCEDURE — 85025 COMPLETE CBC W/AUTO DIFF WBC: CPT

## 2023-03-20 PROCEDURE — 93005 ELECTROCARDIOGRAM TRACING: CPT | Performed by: NURSE PRACTITIONER

## 2023-03-20 PROCEDURE — 96374 THER/PROPH/DIAG INJ IV PUSH: CPT

## 2023-03-20 PROCEDURE — 80307 DRUG TEST PRSMV CHEM ANLYZR: CPT

## 2023-03-20 PROCEDURE — 81001 URINALYSIS AUTO W/SCOPE: CPT

## 2023-03-20 PROCEDURE — 80053 COMPREHEN METABOLIC PANEL: CPT

## 2023-03-20 PROCEDURE — 71045 X-RAY EXAM CHEST 1 VIEW: CPT

## 2023-03-20 PROCEDURE — 84484 ASSAY OF TROPONIN QUANT: CPT

## 2023-03-20 PROCEDURE — G0480 DRUG TEST DEF 1-7 CLASSES: HCPCS

## 2023-03-20 PROCEDURE — 6360000002 HC RX W HCPCS: Performed by: NURSE PRACTITIONER

## 2023-03-20 PROCEDURE — 99285 EMERGENCY DEPT VISIT HI MDM: CPT

## 2023-03-20 RX ORDER — LORAZEPAM 2 MG/ML
0.5 INJECTION INTRAMUSCULAR ONCE
Status: COMPLETED | OUTPATIENT
Start: 2023-03-20 | End: 2023-03-20

## 2023-03-20 RX ADMIN — LORAZEPAM 0.5 MG: 2 INJECTION INTRAMUSCULAR; INTRAVENOUS at 10:41

## 2023-03-20 NOTE — ED TRIAGE NOTES
Patient states approx 2 weeks ago she was taken off ativan at [facility],states she does not have the nerve to kill herself

## 2023-03-20 NOTE — ED NOTES
Chief Complaint:      Anxiety    Provisional Diagnosis:   Hx anxiety per record  Hx depression per record     Risk, Psychosocial and Contextual Factors: (homeless, lack of social support etc.):       Current MH Treatment:     Outpatient at [facility]      Present Suicidal Behavior:    Verbal:  Denies   Attempt:  Denies     Access to Weapons:  Household items     C-SSRS Current Suicide Risk: Low, Moderate or High:      No risk    Past Suicidal Behavior:    Verbal:  Denies   Attempts:  Denies     Self-Injurious/Self-Mutilation: (Specify)  Denies     Traumatic Event Within Past 2 Weeks: (Specify)   Medication changes     Current Abuse:  (Specify)  Denies     Legal: (Specify)  Denies     Violence: (Specify)  Denies     Protective Factors:    Supportive family     Housing:  Lives with son    Risk Factors:   Hx anxiety per record  Hx depression per record   Recent medication changes     Clinical Summary:    Pt presents to ED with anxiety. Pt daughter at bedside, pt gives consent for daughter to remain at bedside during assessment. States approximately 2 weeks ago pt was taken off ativan and has had worsening anxiety and poor sleep since. States she has an appointment at 1500 today with her psychiatrist. [place] increased irritability and she is not as productive as normal.    Denies GABRIEL  Denies AVH  AO4  Denies alcohol or drug use  States she smokes cigarettes, approximately 1 ppd   States her sleep is poor, approximately 0-3 hours of sleep per night   States her appetite is higher than normal     Pt calm and cooperative  Good eye contact  Fair insight and judgement     MSW explained that what we are able to do is either admit pt to inpatient psychiatric unit for discharge her to follow up with outpatient. Pt stating that she does not want to be admitted to inpatient. MSW provided resource list to pt and daughter.  Discussed the important of following up with her outpatient appointment and following the psychiatrist

## 2023-03-20 NOTE — ED PROVIDER NOTES
ECG:  Sinus bradycardia. Ventricular rate of 53. AL is 192. QRS is 146. QTc is slightly prolonged at 471. There is a right bundle branch block morphology. There are no abnormal ST elevations or depressions. There are some T wave inversions in the septal leads of unclear significance. There is no ectopy.      Ursula Nicolas MD  03/20/23 [id number]
notes for additional information     PAST MEDICAL AND SURGICAL HISTORY    Past Medical History:   Diagnosis Date    Acute kidney failure ([facility] [address] 2/16/2018    Chronic kidney disease     Hypertension     Hypothyroidism     Type 2 diabetes mellitus without complication ([facility]      History reviewed. No pertinent surgical history.     CURRENT MEDICATIONS    Current Outpatient Rx   Medication Sig Dispense Refill    busPIRone (BUSPAR) 15 MG tablet Take 15 mg by mouth 3 times daily      hydrOXYzine HCl (ATARAX) 25 MG tablet Take 25 mg by mouth 3 times daily as needed for Anxiety      QUEtiapine (SEROQUEL) 25 MG tablet Take 25 mg by mouth nightly      cloNIDine (CATAPRES) 0.2 MG tablet Take 1 tablet by mouth 2 times daily 30 tablet 3    acetaminophen (TYLENOL) 500 MG tablet Take 500 mg by mouth every 8 hours      metoprolol tartrate (LOPRESSOR) 25 MG tablet TAKE ONE TABLET BY MOUTH EVERY MORNING AND TAKE ONE TABLET BY MOUTH EVERY NIGHT AT BEDTIME 60 tablet 3    glimepiride (AMARYL) 1 MG tablet Take 1 tablet by mouth every morning 30 tablet 3    levothyroxine (SYNTHROID) 88 MCG tablet TAKE ONE TABLET BY MOUTH DAILY 30 tablet 3    amLODIPine (NORVASC) 10 MG tablet TAKE ONE TABLET BY MOUTH DAILY 30 tablet 3    chlorthalidone (HYGROTON) 25 MG tablet TAKE ONE TABLET BY MOUTH DAILY 30 tablet 3    clopidogrel (PLAVIX) 75 MG tablet TAKE ONE TABLET BY MOUTH IN THE MORNING 90 tablet 1    lisinopril (PRINIVIL;ZESTRIL) 40 MG tablet Take 1 tablet by mouth daily 90 tablet 3    empagliflozin (JARDIANCE) 10 MG tablet Take 1 tablet by mouth daily 30 tablet 4    atorvastatin (LIPITOR) 40 MG tablet Take 1 tablet by mouth daily TAKE ONE TABLET BY MOUTH DAILY 30 tablet 3    aspirin 81 MG chewable tablet Take 1 tablet by mouth daily 30 tablet 0    sertraline (ZOLOFT) 25 MG tablet Take 200 mg by mouth daily          ALLERGIES    Allergies   Allergen Reactions    Seasonal        SOCIAL AND FAMILY HISTORY    Social History     Socioeconomic History

## 2023-03-20 NOTE — DISCHARGE INSTRUCTIONS
Follow up at mental health today at 3 pm as scheduled. Follow up with your primary care provider within a week, call today to schedule an appointment. Have them recheck your blood pressure, it was elevated during today's stay. Return to the emergency department with worsening symptoms.

## 2023-03-20 NOTE — ED NOTES
Patient reports continuous anxiety for last 1.5 days. Reports not sleeping last night. Patient and family reports patient was taken off ativan 1.5 week ago and placed on Seroquel 25 mg nightly and vistaril. patient has stopped taking the vistaril. Mony Arenas.  Rayshawn, GREGG  03/20/23 1000

## 2023-04-24 RX ORDER — CLONIDINE HYDROCHLORIDE 0.2 MG/1
0.2 TABLET ORAL 2 TIMES DAILY
Qty: 60 TABLET | Refills: 3 | Status: SHIPPED | OUTPATIENT
Start: 2023-04-24

## 2023-05-04 ENCOUNTER — OFFICE VISIT (OUTPATIENT)
Dept: INTERNAL MEDICINE CLINIC | Age: 78
End: 2023-05-04
Payer: MEDICARE

## 2023-05-04 VITALS
WEIGHT: 190 LBS | SYSTOLIC BLOOD PRESSURE: 130 MMHG | OXYGEN SATURATION: 97 % | HEART RATE: 50 BPM | DIASTOLIC BLOOD PRESSURE: 78 MMHG | BODY MASS INDEX: 31.65 KG/M2 | HEIGHT: 65 IN

## 2023-05-04 DIAGNOSIS — I10 ESSENTIAL HYPERTENSION: Primary | ICD-10-CM

## 2023-05-04 DIAGNOSIS — N18.4 STAGE 4 CHRONIC KIDNEY DISEASE (HCC): ICD-10-CM

## 2023-05-04 DIAGNOSIS — Z72.0 TOBACCO ABUSE: ICD-10-CM

## 2023-05-04 DIAGNOSIS — M17.0 ARTHRITIS OF BOTH KNEES: ICD-10-CM

## 2023-05-04 DIAGNOSIS — E03.4 HYPOTHYROIDISM DUE TO ACQUIRED ATROPHY OF THYROID: ICD-10-CM

## 2023-05-04 DIAGNOSIS — F41.9 ANXIETY: ICD-10-CM

## 2023-05-04 DIAGNOSIS — F32.A CHRONIC DEPRESSION: ICD-10-CM

## 2023-05-04 DIAGNOSIS — E78.2 MIXED HYPERLIPIDEMIA: ICD-10-CM

## 2023-05-04 DIAGNOSIS — Z86.73 HISTORY OF ISCHEMIC STROKE: ICD-10-CM

## 2023-05-04 DIAGNOSIS — E11.65 TYPE 2 DIABETES MELLITUS WITH HYPERGLYCEMIA, WITHOUT LONG-TERM CURRENT USE OF INSULIN (HCC): ICD-10-CM

## 2023-05-04 LAB
CHP ED QC CHECK: NORMAL
GLUCOSE BLD-MCNC: 176 MG/DL
HBA1C MFR BLD: 8.4 %

## 2023-05-04 PROCEDURE — 82962 GLUCOSE BLOOD TEST: CPT | Performed by: INTERNAL MEDICINE

## 2023-05-04 PROCEDURE — 99214 OFFICE O/P EST MOD 30 MIN: CPT | Performed by: INTERNAL MEDICINE

## 2023-05-04 PROCEDURE — G8417 CALC BMI ABV UP PARAM F/U: HCPCS | Performed by: INTERNAL MEDICINE

## 2023-05-04 PROCEDURE — 4004F PT TOBACCO SCREEN RCVD TLK: CPT | Performed by: INTERNAL MEDICINE

## 2023-05-04 PROCEDURE — G8400 PT W/DXA NO RESULTS DOC: HCPCS | Performed by: INTERNAL MEDICINE

## 2023-05-04 PROCEDURE — 83036 HEMOGLOBIN GLYCOSYLATED A1C: CPT | Performed by: INTERNAL MEDICINE

## 2023-05-04 PROCEDURE — 3075F SYST BP GE 130 - 139MM HG: CPT | Performed by: INTERNAL MEDICINE

## 2023-05-04 PROCEDURE — 1123F ACP DISCUSS/DSCN MKR DOCD: CPT | Performed by: INTERNAL MEDICINE

## 2023-05-04 PROCEDURE — 3052F HG A1C>EQUAL 8.0%<EQUAL 9.0%: CPT | Performed by: INTERNAL MEDICINE

## 2023-05-04 PROCEDURE — 1090F PRES/ABSN URINE INCON ASSESS: CPT | Performed by: INTERNAL MEDICINE

## 2023-05-04 PROCEDURE — 3078F DIAST BP <80 MM HG: CPT | Performed by: INTERNAL MEDICINE

## 2023-05-04 PROCEDURE — G8427 DOCREV CUR MEDS BY ELIG CLIN: HCPCS | Performed by: INTERNAL MEDICINE

## 2023-05-04 NOTE — PROGRESS NOTES
Name: Geo oHlcomb  [id number]  Age: 68 y.o. YOB: 1945  Sex: female    CHIEF COMPLAINT:    Chief Complaint   Patient presents with    Hypertension    Hyperlipidemia    Other     Other chronic conditions         HISTORY OF PRESENT ILLNESS:     This is a pleasant  68 y.o. female  is seen today for management of chronic medical problems and medications refills. Previous records reviewed . She claims she is doing better. Her BP is better. She claims that her psych medications  are being adjusted by her psychiatrist and she is unable to tolerate it. She is having severe anxiety and panic attacks since her Ativan discontinued and Vistaril started. . Vistaril was not helping her. She did see Dr. Jorgito Alvares and he started her on Klonopin I mg daily and she doing much better since then. Her BP is also better since then. Also recently Dr. Jorgito Alvares added aldactone for BP and low K+. She apparently had an acute/subacute ischemic stroke  in May 2022. Recovered nicely. She is doing better. No significant weakness or dizziness except when she tries to get up fast.     Doing OK  otherwise. .  Denies CP or SOB. No fever , sore throat or cough or congestion. Denies any abdominal pain. Appetite OK. Bowels moving [id number] Yoko Segura No urinary symptoms. Still with significant pain in her knees. Takes Tylenol for it. And also uses Voltaren gel. Does not want to see an orthopedic doctor at this time. Hearing is ok. Vision Ok with glasses. Denies  any significant skin lesions. She is not checking her sugars regularly at home. Usually @ 130-160 when she checks, .     Dr. Jorgito Alvares sees her periodically for her CKD. She is still smoking @ 1 PPD. Going to restart using Nicotine patches soon. No other new complaints. She was  vaccinated for COVID-19 x2. But has not taken booster dose yet. Did not get the Flu shot yet.   Labs from 8 2/4/2022 and 3/20/2023  were reviewed and explained to the

## 2023-05-11 DIAGNOSIS — E11.65 TYPE 2 DIABETES MELLITUS WITH HYPERGLYCEMIA, WITHOUT LONG-TERM CURRENT USE OF INSULIN (HCC): ICD-10-CM

## 2023-05-11 RX ORDER — GLIMEPIRIDE 1 MG/1
TABLET ORAL
Qty: 30 TABLET | Refills: 3 | Status: SHIPPED | OUTPATIENT
Start: 2023-05-11

## 2023-06-29 DIAGNOSIS — E11.65 TYPE 2 DIABETES MELLITUS WITH HYPERGLYCEMIA, WITHOUT LONG-TERM CURRENT USE OF INSULIN (HCC): ICD-10-CM

## 2023-06-29 RX ORDER — GLIMEPIRIDE 1 MG/1
TABLET ORAL
Qty: 30 TABLET | Refills: 0 | Status: SHIPPED | OUTPATIENT
Start: 2023-06-29 | End: 2023-08-23 | Stop reason: SDUPTHER

## 2023-07-01 DIAGNOSIS — E78.2 MIXED HYPERLIPIDEMIA: ICD-10-CM

## 2023-07-03 RX ORDER — ATORVASTATIN CALCIUM 40 MG/1
TABLET, FILM COATED ORAL
Qty: 90 TABLET | Refills: 1 | Status: SHIPPED | OUTPATIENT
Start: 2023-07-03

## 2023-08-07 RX ORDER — CLOPIDOGREL BISULFATE 75 MG/1
TABLET ORAL
Qty: 90 TABLET | Refills: 1 | Status: SHIPPED | OUTPATIENT
Start: 2023-08-07

## 2023-08-21 RX ORDER — CLONIDINE HYDROCHLORIDE 0.2 MG/1
TABLET ORAL
Qty: 180 TABLET | Refills: 1 | Status: SHIPPED | OUTPATIENT
Start: 2023-08-21 | End: 2023-08-23 | Stop reason: SDUPTHER

## 2023-08-23 ENCOUNTER — OFFICE VISIT (OUTPATIENT)
Dept: INTERNAL MEDICINE CLINIC | Age: 78
End: 2023-08-23
Payer: MEDICARE

## 2023-08-23 VITALS
SYSTOLIC BLOOD PRESSURE: 134 MMHG | OXYGEN SATURATION: 96 % | WEIGHT: 191 LBS | DIASTOLIC BLOOD PRESSURE: 78 MMHG | BODY MASS INDEX: 31.82 KG/M2 | HEIGHT: 65 IN | HEART RATE: 64 BPM

## 2023-08-23 DIAGNOSIS — M17.0 ARTHRITIS OF BOTH KNEES: ICD-10-CM

## 2023-08-23 DIAGNOSIS — E11.65 TYPE 2 DIABETES MELLITUS WITH HYPERGLYCEMIA, WITHOUT LONG-TERM CURRENT USE OF INSULIN (HCC): ICD-10-CM

## 2023-08-23 DIAGNOSIS — N18.4 STAGE 4 CHRONIC KIDNEY DISEASE (HCC): ICD-10-CM

## 2023-08-23 DIAGNOSIS — Z72.0 TOBACCO ABUSE: ICD-10-CM

## 2023-08-23 DIAGNOSIS — E03.4 HYPOTHYROIDISM DUE TO ACQUIRED ATROPHY OF THYROID: ICD-10-CM

## 2023-08-23 DIAGNOSIS — F41.9 ACUTE ANXIETY: ICD-10-CM

## 2023-08-23 DIAGNOSIS — F32.A CHRONIC DEPRESSION: ICD-10-CM

## 2023-08-23 DIAGNOSIS — Z86.73 HISTORY OF ISCHEMIC STROKE: ICD-10-CM

## 2023-08-23 DIAGNOSIS — I10 ESSENTIAL HYPERTENSION: Primary | ICD-10-CM

## 2023-08-23 DIAGNOSIS — E78.2 MIXED HYPERLIPIDEMIA: ICD-10-CM

## 2023-08-23 PROCEDURE — 1090F PRES/ABSN URINE INCON ASSESS: CPT | Performed by: INTERNAL MEDICINE

## 2023-08-23 PROCEDURE — G8400 PT W/DXA NO RESULTS DOC: HCPCS | Performed by: INTERNAL MEDICINE

## 2023-08-23 PROCEDURE — 99214 OFFICE O/P EST MOD 30 MIN: CPT | Performed by: INTERNAL MEDICINE

## 2023-08-23 PROCEDURE — 3075F SYST BP GE 130 - 139MM HG: CPT | Performed by: INTERNAL MEDICINE

## 2023-08-23 PROCEDURE — G8427 DOCREV CUR MEDS BY ELIG CLIN: HCPCS | Performed by: INTERNAL MEDICINE

## 2023-08-23 PROCEDURE — 3052F HG A1C>EQUAL 8.0%<EQUAL 9.0%: CPT | Performed by: INTERNAL MEDICINE

## 2023-08-23 PROCEDURE — G8417 CALC BMI ABV UP PARAM F/U: HCPCS | Performed by: INTERNAL MEDICINE

## 2023-08-23 PROCEDURE — 1123F ACP DISCUSS/DSCN MKR DOCD: CPT | Performed by: INTERNAL MEDICINE

## 2023-08-23 PROCEDURE — 4004F PT TOBACCO SCREEN RCVD TLK: CPT | Performed by: INTERNAL MEDICINE

## 2023-08-23 PROCEDURE — 3078F DIAST BP <80 MM HG: CPT | Performed by: INTERNAL MEDICINE

## 2023-08-23 RX ORDER — AMLODIPINE BESYLATE 10 MG/1
TABLET ORAL
Qty: 30 TABLET | Refills: 3 | Status: SHIPPED | OUTPATIENT
Start: 2023-08-23

## 2023-08-23 RX ORDER — GLIMEPIRIDE 1 MG/1
1 TABLET ORAL EVERY MORNING
Qty: 90 TABLET | Refills: 1 | Status: SHIPPED | OUTPATIENT
Start: 2023-08-23

## 2023-08-23 RX ORDER — BUSPIRONE HYDROCHLORIDE 10 MG/1
20 TABLET ORAL 3 TIMES DAILY
Qty: 270 TABLET | Refills: 1 | Status: SHIPPED | OUTPATIENT
Start: 2023-08-23

## 2023-08-23 RX ORDER — SERTRALINE HYDROCHLORIDE 100 MG/1
100 TABLET, FILM COATED ORAL DAILY
Qty: 30 TABLET | Refills: 1 | Status: SHIPPED | OUTPATIENT
Start: 2023-08-23

## 2023-08-23 RX ORDER — CLONIDINE HYDROCHLORIDE 0.2 MG/1
0.2 TABLET ORAL 2 TIMES DAILY
Qty: 180 TABLET | Refills: 1 | Status: SHIPPED | OUTPATIENT
Start: 2023-08-23

## 2023-08-23 RX ORDER — SPIRONOLACTONE 25 MG/1
25 TABLET ORAL DAILY
Qty: 90 TABLET | Refills: 1 | Status: SHIPPED | OUTPATIENT
Start: 2023-08-23

## 2023-08-23 RX ORDER — CLONAZEPAM 1 MG/1
1 TABLET ORAL DAILY
Qty: 30 TABLET | Refills: 0 | Status: CANCELLED | OUTPATIENT
Start: 2023-08-23 | End: 2023-09-22

## 2023-08-23 NOTE — PROGRESS NOTES
Name: Kayden Robbins  [id number]  Age: 68 y.o. YOB: 1945  Sex: female    CHIEF COMPLAINT:    Chief Complaint   Patient presents with    Hypertension    Hyperlipidemia    Other     Other chronic conditions         HISTORY OF PRESENT ILLNESS:     This is a pleasant  68 y.o. female  is seen today for management of chronic medical problems and medications refills. Previous records reviewed . She claims she is doing better. Her BP is better. Anxiety is better. She is looking for a new psychiatrist but unable to find one. Her nephrologist Dr. Debbie Diallo is giving her Klonopin for her anxiety and also she is taking BuSpar which is helping her significantly. She apparently had an acute/subacute ischemic stroke  in May 2022. Recovered nicely. She is doing better. No significant weakness or dizziness except when she tries to get up fast.     Doing OK  otherwise. .  Denies CP or SOB. No fever , sore throat or cough or congestion. Denies any abdominal pain. Appetite OK. Bowels moving Brigitte Saner. No urinary symptoms. Still with significant pain in her knees. Takes Tylenol for it. And also uses Voltaren gel. Does not want to see an orthopedic doctor at this time. Hearing is ok. Vision Ok with glasses. Denies  any significant skin lesions. She is not checking her sugars regularly at home. Usually @ 130-160 when she checks, . She is currently taking the Jardiance 10 mg and Amaryl 1 mg daily. Dr. Debbie Diallo sees her periodically for her CKD. Her renal functions are somewhat deteriorating recently     She is still smoking @ 1 PPD. Does not want any medication to quit smoking at this time but she will try to slow down on smoking. No other new complaints. She was  vaccinated for COVID-19 x2. But has not taken booster dose yet. Did not get the Flu shot yet.   Labs from 7/27/2023 and 8/17/2023 were reviewed and explained to the patient  Last hemoglobin A1c was 8.4 on

## 2023-10-20 DIAGNOSIS — I10 ESSENTIAL HYPERTENSION: ICD-10-CM

## 2023-10-20 DIAGNOSIS — E03.4 HYPOTHYROIDISM DUE TO ACQUIRED ATROPHY OF THYROID: ICD-10-CM

## 2023-10-20 RX ORDER — CHLORTHALIDONE 25 MG/1
TABLET ORAL
Qty: 30 TABLET | Refills: 3 | Status: SHIPPED | OUTPATIENT
Start: 2023-10-20

## 2023-10-20 RX ORDER — LEVOTHYROXINE SODIUM 88 UG/1
TABLET ORAL
Qty: 30 TABLET | Refills: 3 | Status: SHIPPED | OUTPATIENT
Start: 2023-10-20

## 2023-11-16 ENCOUNTER — OFFICE VISIT (OUTPATIENT)
Dept: INTERNAL MEDICINE CLINIC | Age: 78
End: 2023-11-16

## 2023-11-16 VITALS
HEART RATE: 65 BPM | DIASTOLIC BLOOD PRESSURE: 72 MMHG | OXYGEN SATURATION: 96 % | SYSTOLIC BLOOD PRESSURE: 115 MMHG | BODY MASS INDEX: 31.37 KG/M2 | WEIGHT: 195.2 LBS | HEIGHT: 66 IN

## 2023-11-16 DIAGNOSIS — F32.A CHRONIC DEPRESSION: ICD-10-CM

## 2023-11-16 DIAGNOSIS — I10 ESSENTIAL HYPERTENSION: Primary | ICD-10-CM

## 2023-11-16 DIAGNOSIS — E11.65 TYPE 2 DIABETES MELLITUS WITH HYPERGLYCEMIA, WITHOUT LONG-TERM CURRENT USE OF INSULIN (HCC): ICD-10-CM

## 2023-11-16 DIAGNOSIS — E03.4 HYPOTHYROIDISM DUE TO ACQUIRED ATROPHY OF THYROID: ICD-10-CM

## 2023-11-16 DIAGNOSIS — M17.0 ARTHRITIS OF BOTH KNEES: ICD-10-CM

## 2023-11-16 DIAGNOSIS — E78.2 MIXED HYPERLIPIDEMIA: ICD-10-CM

## 2023-11-16 DIAGNOSIS — F41.9 ACUTE ANXIETY: ICD-10-CM

## 2023-11-16 DIAGNOSIS — N18.4 STAGE 4 CHRONIC KIDNEY DISEASE (HCC): ICD-10-CM

## 2023-11-16 DIAGNOSIS — Z72.0 TOBACCO ABUSE: ICD-10-CM

## 2023-11-16 LAB
CHP ED QC CHECK: NORMAL
GLUCOSE BLD-MCNC: 200 MG/DL

## 2023-11-16 RX ORDER — CLONAZEPAM 1 MG/1
1 TABLET ORAL DAILY
Qty: 30 TABLET | Refills: 2 | Status: CANCELLED | OUTPATIENT
Start: 2023-11-16 | End: 2024-12-16

## 2023-11-16 RX ORDER — SERTRALINE HYDROCHLORIDE 100 MG/1
100 TABLET, FILM COATED ORAL DAILY
Qty: 30 TABLET | Refills: 1 | Status: SHIPPED | OUTPATIENT
Start: 2023-11-16

## 2023-11-16 RX ORDER — LISINOPRIL 40 MG/1
40 TABLET ORAL DAILY
Qty: 90 TABLET | Refills: 3 | Status: SHIPPED | OUTPATIENT
Start: 2023-11-16

## 2023-11-16 RX ORDER — ATORVASTATIN CALCIUM 40 MG/1
TABLET, FILM COATED ORAL
Qty: 90 TABLET | Refills: 1 | Status: SHIPPED | OUTPATIENT
Start: 2023-11-16

## 2023-11-16 NOTE — PROGRESS NOTES
Normocephalic, atraumatic   EYES:                          PERRLA, EOMI, lids normal, conjuctivea clear, sclera anicteric. NECK:                         Supple, symmetrical,  trachea midline, no thyromegaly, no JVD, no lymphadenopathy. LUNGS:                       Clear to auscultation bilaterally, respirations unlabored, accessory muscles are not used. HEART:                       Regular rate and rhythm, S1 and S2 normal, no murmur, rub or gallop. PMI in MCL. ABDOMEN:                 Soft, non-tender, bowel sounds are normoactive, no masses, no hepatospleenomegaly. EXTREMITY:              1 + edema. NEURO:                      Alert, oriented to person, place and time. Grossly intact. Musculoskeletal:         No kyphosis or scoliosis, no deformity in any extremity noted, muscle strength and tone are normal.  Skin:                            Warm and dry. No rash or obvious suspicious lesions. PSYCH:                       Patient appears anxious. ASSESSMENT/PLAN:    1. Essential hypertension  Continue current medications, denies side effect with medicationss. Low salt diet and exercise advised. Continue Lopressor, Norvasc and clonidine and Hygroton  - lisinopril (PRINIVIL;ZESTRIL) 40 MG tablet; Take 1 tablet by mouth daily  Dispense: 90 tablet; Refill: 3    2. Mixed hyperlipidemia  Patient is taking cholesterol medications regularly. Denies any side effects. Diet and exercise advised. Continue Lipitor  - atorvastatin (LIPITOR) 40 MG tablet; TAKE ONE TABLET BY MOUTH DAILY IN THE MORNING  Dispense: 90 tablet; Refill: 1    3. Type 2 diabetes mellitus with hyperglycemia, without long-term current use of insulin (HCC)  Continue Amaryl and Jardiance. Advised low sugar diet and exercise and weight loss  - POCT Glucose    4. Chronic depression  Continue on Zoloft  - sertraline (ZOLOFT) 100 MG tablet;  Take 1 tablet by mouth daily  Dispense: No

## 2023-11-29 RX ORDER — BUSPIRONE HYDROCHLORIDE 10 MG/1
10 TABLET ORAL 3 TIMES DAILY
Qty: 270 TABLET | Refills: 1 | Status: SHIPPED | OUTPATIENT
Start: 2023-11-29

## 2023-11-29 RX ORDER — BUSPIRONE HYDROCHLORIDE 10 MG/1
TABLET ORAL
Qty: 540 TABLET | OUTPATIENT
Start: 2023-11-29

## 2024-02-11 DIAGNOSIS — I10 ESSENTIAL HYPERTENSION: ICD-10-CM

## 2024-02-12 RX ORDER — CLOPIDOGREL BISULFATE 75 MG/1
TABLET ORAL
Qty: 90 TABLET | Refills: 1 | Status: SHIPPED | OUTPATIENT
Start: 2024-02-12

## 2024-02-22 ENCOUNTER — OFFICE VISIT (OUTPATIENT)
Dept: INTERNAL MEDICINE CLINIC | Age: 79
End: 2024-02-22
Payer: MEDICARE

## 2024-02-22 VITALS
HEIGHT: 65 IN | HEART RATE: 82 BPM | WEIGHT: 192 LBS | OXYGEN SATURATION: 97 % | DIASTOLIC BLOOD PRESSURE: 80 MMHG | SYSTOLIC BLOOD PRESSURE: 151 MMHG | BODY MASS INDEX: 31.99 KG/M2

## 2024-02-22 DIAGNOSIS — I10 ESSENTIAL HYPERTENSION: Primary | ICD-10-CM

## 2024-02-22 DIAGNOSIS — E11.22 TYPE 2 DIABETES MELLITUS WITH STAGE 4 CHRONIC KIDNEY DISEASE, WITHOUT LONG-TERM CURRENT USE OF INSULIN (HCC): ICD-10-CM

## 2024-02-22 DIAGNOSIS — E78.2 MIXED HYPERLIPIDEMIA: ICD-10-CM

## 2024-02-22 DIAGNOSIS — N18.4 TYPE 2 DIABETES MELLITUS WITH STAGE 4 CHRONIC KIDNEY DISEASE, WITHOUT LONG-TERM CURRENT USE OF INSULIN (HCC): ICD-10-CM

## 2024-02-22 DIAGNOSIS — E03.4 HYPOTHYROIDISM DUE TO ACQUIRED ATROPHY OF THYROID: ICD-10-CM

## 2024-02-22 DIAGNOSIS — E11.65 TYPE 2 DIABETES MELLITUS WITH HYPERGLYCEMIA, WITHOUT LONG-TERM CURRENT USE OF INSULIN (HCC): ICD-10-CM

## 2024-02-22 DIAGNOSIS — Z86.73 HISTORY OF ISCHEMIC STROKE: ICD-10-CM

## 2024-02-22 DIAGNOSIS — F41.9 ANXIETY: ICD-10-CM

## 2024-02-22 DIAGNOSIS — F32.A CHRONIC DEPRESSION: ICD-10-CM

## 2024-02-22 DIAGNOSIS — Z72.0 TOBACCO ABUSE: ICD-10-CM

## 2024-02-22 DIAGNOSIS — M17.0 ARTHRITIS OF BOTH KNEES: ICD-10-CM

## 2024-02-22 DIAGNOSIS — N18.4 CKD STAGE G4/A1, GFR 15-29 AND ALBUMIN CREATININE RATIO <30 MG/G (HCC): ICD-10-CM

## 2024-02-22 LAB
CHP ED QC CHECK: NORMAL
GLUCOSE BLD-MCNC: 205 MG/DL
HBA1C MFR BLD: 9.2 %

## 2024-02-22 PROCEDURE — G8417 CALC BMI ABV UP PARAM F/U: HCPCS | Performed by: INTERNAL MEDICINE

## 2024-02-22 PROCEDURE — 82962 GLUCOSE BLOOD TEST: CPT | Performed by: INTERNAL MEDICINE

## 2024-02-22 PROCEDURE — 3077F SYST BP >= 140 MM HG: CPT | Performed by: INTERNAL MEDICINE

## 2024-02-22 PROCEDURE — 3078F DIAST BP <80 MM HG: CPT | Performed by: INTERNAL MEDICINE

## 2024-02-22 PROCEDURE — 3046F HEMOGLOBIN A1C LEVEL >9.0%: CPT | Performed by: INTERNAL MEDICINE

## 2024-02-22 PROCEDURE — G8400 PT W/DXA NO RESULTS DOC: HCPCS | Performed by: INTERNAL MEDICINE

## 2024-02-22 PROCEDURE — 1090F PRES/ABSN URINE INCON ASSESS: CPT | Performed by: INTERNAL MEDICINE

## 2024-02-22 PROCEDURE — 4004F PT TOBACCO SCREEN RCVD TLK: CPT | Performed by: INTERNAL MEDICINE

## 2024-02-22 PROCEDURE — 1123F ACP DISCUSS/DSCN MKR DOCD: CPT | Performed by: INTERNAL MEDICINE

## 2024-02-22 PROCEDURE — 83036 HEMOGLOBIN GLYCOSYLATED A1C: CPT | Performed by: INTERNAL MEDICINE

## 2024-02-22 PROCEDURE — G8484 FLU IMMUNIZE NO ADMIN: HCPCS | Performed by: INTERNAL MEDICINE

## 2024-02-22 PROCEDURE — 99214 OFFICE O/P EST MOD 30 MIN: CPT | Performed by: INTERNAL MEDICINE

## 2024-02-22 PROCEDURE — G8427 DOCREV CUR MEDS BY ELIG CLIN: HCPCS | Performed by: INTERNAL MEDICINE

## 2024-02-22 RX ORDER — METOPROLOL TARTRATE 50 MG/1
50 TABLET, FILM COATED ORAL 2 TIMES DAILY
Qty: 60 TABLET | Refills: 5 | Status: SHIPPED | OUTPATIENT
Start: 2024-02-22

## 2024-02-22 SDOH — ECONOMIC STABILITY: FOOD INSECURITY: WITHIN THE PAST 12 MONTHS, THE FOOD YOU BOUGHT JUST DIDN'T LAST AND YOU DIDN'T HAVE MONEY TO GET MORE.: NEVER TRUE

## 2024-02-22 SDOH — ECONOMIC STABILITY: INCOME INSECURITY: HOW HARD IS IT FOR YOU TO PAY FOR THE VERY BASICS LIKE FOOD, HOUSING, MEDICAL CARE, AND HEATING?: NOT VERY HARD

## 2024-02-22 SDOH — ECONOMIC STABILITY: FOOD INSECURITY: WITHIN THE PAST 12 MONTHS, YOU WORRIED THAT YOUR FOOD WOULD RUN OUT BEFORE YOU GOT MONEY TO BUY MORE.: NEVER TRUE

## 2024-02-22 ASSESSMENT — PATIENT HEALTH QUESTIONNAIRE - PHQ9
1. LITTLE INTEREST OR PLEASURE IN DOING THINGS: 0
7. TROUBLE CONCENTRATING ON THINGS, SUCH AS READING THE NEWSPAPER OR WATCHING TELEVISION: 0
3. TROUBLE FALLING OR STAYING ASLEEP: 0
8. MOVING OR SPEAKING SO SLOWLY THAT OTHER PEOPLE COULD HAVE NOTICED. OR THE OPPOSITE, BEING SO FIGETY OR RESTLESS THAT YOU HAVE BEEN MOVING AROUND A LOT MORE THAN USUAL: 0
SUM OF ALL RESPONSES TO PHQ9 QUESTIONS 1 & 2: 0
4. FEELING TIRED OR HAVING LITTLE ENERGY: 0
6. FEELING BAD ABOUT YOURSELF - OR THAT YOU ARE A FAILURE OR HAVE LET YOURSELF OR YOUR FAMILY DOWN: 0
SUM OF ALL RESPONSES TO PHQ QUESTIONS 1-9: 0
SUM OF ALL RESPONSES TO PHQ QUESTIONS 1-9: 0
5. POOR APPETITE OR OVEREATING: 0
2. FEELING DOWN, DEPRESSED OR HOPELESS: 0
SUM OF ALL RESPONSES TO PHQ QUESTIONS 1-9: 0
10. IF YOU CHECKED OFF ANY PROBLEMS, HOW DIFFICULT HAVE THESE PROBLEMS MADE IT FOR YOU TO DO YOUR WORK, TAKE CARE OF THINGS AT HOME, OR GET ALONG WITH OTHER PEOPLE: 0
SUM OF ALL RESPONSES TO PHQ QUESTIONS 1-9: 0
9. THOUGHTS THAT YOU WOULD BE BETTER OFF DEAD, OR OF HURTING YOURSELF: 0

## 2024-02-22 NOTE — PROGRESS NOTES
Yes Panchito Menjivar MD   silver sulfADIAZINE (SILVADENE) 1 % cream Apply topically daily. 10/12/23  Yes Andrew Rendon MD   amLODIPine (NORVASC) 10 MG tablet TAKE ONE TABLET BY MOUTH DAILY 8/23/23  Yes Panchito Menjivar MD   spironolactone (ALDACTONE) 25 MG tablet Take 1 tablet by mouth daily 8/23/23  Yes Panchito Menjivar MD   glimepiride (AMARYL) 1 MG tablet Take 1 tablet by mouth every morning 8/23/23  Yes Panchito Menjivar MD   cloNIDine (CATAPRES) 0.2 MG tablet Take 1 tablet by mouth 2 times daily 8/23/23  Yes Panchito Menjivar MD   clonazePAM (KLONOPIN) 1 MG tablet Take 1 tablet by mouth daily for 30 days. Max Daily Amount: 1 mg 5/30/23 2/22/24 Yes Andrew Rnedon MD   acetaminophen (TYLENOL) 500 MG tablet Take 1 tablet by mouth as needed   Yes ProviderToña MD   aspirin 81 MG chewable tablet Take 1 tablet by mouth daily 6/11/22  Yes JAMES Javed MD       LAB DATA: Reviewed.    REVIEW OF SYSTEMS:   see HPI/ Comprehensive review of systems negative except for the ones mentioned in HPI.    PHYSICAL EXAMINATION:   BP (!) 151/80 (Site: Right Upper Arm, Position: Sitting, Cuff Size: Medium Adult)   Pulse 82   Ht 1.651 m (5' 5\")   Wt 87.1 kg (192 lb)   SpO2 97%   BMI 31.95 kg/m²      GENERAL APPEARANCE:      Alert, oriented x 3, well developed, cooperative, not in any distress, appears stated age.  HEAD:                         Normocephalic, atraumatic   EYES:                          PERRLA, EOMI, lids normal, conjuctivea clear, sclera anicteric.   NECK:                         Supple, symmetrical,  trachea midline, no thyromegaly, no JVD, no lymphadenopathy.    LUNGS:                       Clear to auscultation bilaterally, respirations unlabored, accessory muscles are not used.  HEART:                       Regular rate and rhythm, S1 and S2 normal, no murmur, rub or gallop. PMI in MCL.  ABDOMEN:                 Soft, non-tender, bowel sounds are normoactive, no masses, no

## 2024-02-23 DIAGNOSIS — I10 ESSENTIAL HYPERTENSION: ICD-10-CM

## 2024-02-23 DIAGNOSIS — E03.4 HYPOTHYROIDISM DUE TO ACQUIRED ATROPHY OF THYROID: ICD-10-CM

## 2024-02-23 RX ORDER — LEVOTHYROXINE SODIUM 88 UG/1
TABLET ORAL
Qty: 30 TABLET | Refills: 3 | Status: SHIPPED | OUTPATIENT
Start: 2024-02-23

## 2024-02-23 RX ORDER — CHLORTHALIDONE 25 MG/1
TABLET ORAL
Qty: 30 TABLET | Refills: 3 | Status: SHIPPED | OUTPATIENT
Start: 2024-02-23

## 2024-02-24 DIAGNOSIS — I10 ESSENTIAL HYPERTENSION: ICD-10-CM

## 2024-02-26 RX ORDER — CLONIDINE HYDROCHLORIDE 0.2 MG/1
0.2 TABLET ORAL 2 TIMES DAILY
Qty: 180 TABLET | Refills: 1 | Status: SHIPPED | OUTPATIENT
Start: 2024-02-26

## 2024-03-13 ENCOUNTER — TELEPHONE (OUTPATIENT)
Dept: CARDIOLOGY CLINIC | Age: 79
End: 2024-03-13

## 2024-03-13 NOTE — TELEPHONE ENCOUNTER
[phone]. Only the stress test and echo - the patient has not been seen in the office.  I noted that on the fax cover sheet.  Only seen at [facility].

## 2024-03-14 DIAGNOSIS — I10 ESSENTIAL HYPERTENSION: ICD-10-CM

## 2024-03-14 RX ORDER — METOPROLOL TARTRATE 50 MG/1
50 TABLET, FILM COATED ORAL 2 TIMES DAILY
Qty: 60 TABLET | Refills: 3 | Status: SHIPPED | OUTPATIENT
Start: 2024-03-14

## 2024-04-11 DIAGNOSIS — E11.65 TYPE 2 DIABETES MELLITUS WITH HYPERGLYCEMIA, WITHOUT LONG-TERM CURRENT USE OF INSULIN (HCC): ICD-10-CM

## 2024-04-11 RX ORDER — GLIMEPIRIDE 1 MG/1
1 TABLET ORAL EVERY MORNING
Qty: 90 TABLET | Refills: 1 | Status: SHIPPED | OUTPATIENT
Start: 2024-04-11

## 2024-05-28 ENCOUNTER — OFFICE VISIT (OUTPATIENT)
Dept: INTERNAL MEDICINE CLINIC | Age: 79
End: 2024-05-28
Payer: MEDICARE

## 2024-05-28 VITALS
WEIGHT: 197 LBS | SYSTOLIC BLOOD PRESSURE: 136 MMHG | DIASTOLIC BLOOD PRESSURE: 78 MMHG | HEIGHT: 65 IN | BODY MASS INDEX: 32.82 KG/M2 | OXYGEN SATURATION: 95 % | HEART RATE: 88 BPM

## 2024-05-28 DIAGNOSIS — E11.65 TYPE 2 DIABETES MELLITUS WITH HYPERGLYCEMIA, WITHOUT LONG-TERM CURRENT USE OF INSULIN (HCC): ICD-10-CM

## 2024-05-28 DIAGNOSIS — F41.9 ANXIETY: ICD-10-CM

## 2024-05-28 DIAGNOSIS — N18.4 STAGE 4 CHRONIC KIDNEY DISEASE (HCC): ICD-10-CM

## 2024-05-28 DIAGNOSIS — E78.2 MIXED HYPERLIPIDEMIA: ICD-10-CM

## 2024-05-28 DIAGNOSIS — M17.0 ARTHRITIS OF BOTH KNEES: ICD-10-CM

## 2024-05-28 DIAGNOSIS — F32.A CHRONIC DEPRESSION: ICD-10-CM

## 2024-05-28 DIAGNOSIS — Z86.73 HISTORY OF ISCHEMIC STROKE: ICD-10-CM

## 2024-05-28 DIAGNOSIS — Z72.0 TOBACCO ABUSE: ICD-10-CM

## 2024-05-28 DIAGNOSIS — E03.4 HYPOTHYROIDISM DUE TO ACQUIRED ATROPHY OF THYROID: ICD-10-CM

## 2024-05-28 DIAGNOSIS — I10 ESSENTIAL HYPERTENSION: Primary | ICD-10-CM

## 2024-05-28 LAB
CHP ED QC CHECK: NORMAL
GLUCOSE BLD-MCNC: 181 MG/DL
HBA1C MFR BLD: 9.7 %

## 2024-05-28 PROCEDURE — G8417 CALC BMI ABV UP PARAM F/U: HCPCS | Performed by: INTERNAL MEDICINE

## 2024-05-28 PROCEDURE — 82962 GLUCOSE BLOOD TEST: CPT | Performed by: INTERNAL MEDICINE

## 2024-05-28 PROCEDURE — 99214 OFFICE O/P EST MOD 30 MIN: CPT | Performed by: INTERNAL MEDICINE

## 2024-05-28 PROCEDURE — G8400 PT W/DXA NO RESULTS DOC: HCPCS | Performed by: INTERNAL MEDICINE

## 2024-05-28 PROCEDURE — G8427 DOCREV CUR MEDS BY ELIG CLIN: HCPCS | Performed by: INTERNAL MEDICINE

## 2024-05-28 PROCEDURE — 3075F SYST BP GE 130 - 139MM HG: CPT | Performed by: INTERNAL MEDICINE

## 2024-05-28 PROCEDURE — 1090F PRES/ABSN URINE INCON ASSESS: CPT | Performed by: INTERNAL MEDICINE

## 2024-05-28 PROCEDURE — 83036 HEMOGLOBIN GLYCOSYLATED A1C: CPT | Performed by: INTERNAL MEDICINE

## 2024-05-28 PROCEDURE — 1123F ACP DISCUSS/DSCN MKR DOCD: CPT | Performed by: INTERNAL MEDICINE

## 2024-05-28 PROCEDURE — 3046F HEMOGLOBIN A1C LEVEL >9.0%: CPT | Performed by: INTERNAL MEDICINE

## 2024-05-28 PROCEDURE — 3078F DIAST BP <80 MM HG: CPT | Performed by: INTERNAL MEDICINE

## 2024-05-28 PROCEDURE — 4004F PT TOBACCO SCREEN RCVD TLK: CPT | Performed by: INTERNAL MEDICINE

## 2024-05-28 RX ORDER — GLIMEPIRIDE 2 MG/1
2 TABLET ORAL EVERY MORNING
Qty: 90 TABLET | Refills: 1 | Status: SHIPPED | OUTPATIENT
Start: 2024-05-28

## 2024-05-28 RX ORDER — QUETIAPINE FUMARATE 25 MG/1
25 TABLET, FILM COATED ORAL 2 TIMES DAILY
COMMUNITY

## 2024-05-28 RX ORDER — LEVOTHYROXINE SODIUM 88 UG/1
88 TABLET ORAL DAILY
Qty: 90 TABLET | Refills: 1 | Status: SHIPPED | OUTPATIENT
Start: 2024-05-28

## 2024-05-28 RX ORDER — CHLORTHALIDONE 25 MG/1
25 TABLET ORAL DAILY
Qty: 90 TABLET | Refills: 1 | Status: SHIPPED | OUTPATIENT
Start: 2024-05-28

## 2024-05-28 RX ORDER — CLOPIDOGREL BISULFATE 75 MG/1
75 TABLET ORAL EVERY MORNING
Qty: 90 TABLET | Refills: 1 | Status: SHIPPED | OUTPATIENT
Start: 2024-05-28

## 2024-05-28 NOTE — PROGRESS NOTES
S1 and S2 normal, no murmur, rub or gallop. PMI in MCL.  ABDOMEN:                 Soft, non-tender, bowel sounds are normoactive, no masses, no hepatospleenomegaly.  EXTREMITY:              1 + edema..  Tenderness both knees right more than left.  Chronic stasis changes to both legs  NEURO:                      Alert, oriented to person, place and time.                                      Grossly intact.  Musculoskeletal:         No kyphosis or scoliosis, no deformity in any extremity noted, muscle strength and tone are normal.  Skin:                            Warm and dry. No rash or obvious suspicious lesions.                      PSYCH:                       Patient appears anxious.         ASSESSMENT/PLAN:    1. Essential hypertension  Continue Lopressor, Norvasc, clonidine and Hygroton and lisinopril  Advise low-salt diet, exercise and try weight loss.  Advised to continue to follow with Dr. Rendon, her nephrologist  - chlorthalidone (HYGROTON) 25 MG tablet; Take 1 tablet by mouth daily  Dispense: 90 tablet; Refill: 1    2. Mixed hyperlipidemia  Patient is taking cholesterol medications regularly.  Denies any side effects.  Diet and exercise advised.  Continue Lipitor    3. Hypothyroidism due to acquired atrophy of thyroid  Continue Synthroid  - levothyroxine (SYNTHROID) 88 MCG tablet; Take 1 tablet by mouth daily  Dispense: 90 tablet; Refill: 1    4. Type 2 diabetes mellitus with hyperglycemia, without long-term current use of insulin (HCC)  Will increase Amaryl to 2 mg daily and advised to restart Jardiance soon  - POCT Glucose  - POCT glycosylated hemoglobin (Hb A1C)  - glimepiride (AMARYL) 2 MG tablet; Take 1 tablet by mouth every morning  Dispense: 90 tablet; Refill: 1    5. Anxiety  Continue BuSpar and Klonopin    6. Chronic depression  Continue Zoloft    7. History of ischemic stroke  Continue Plavix and Lipitor  - clopidogrel (PLAVIX) 75 MG tablet; Take 1 tablet by mouth every morning  Dispense: 90

## 2024-07-09 ENCOUNTER — OFFICE VISIT (OUTPATIENT)
Dept: ORTHOPEDIC SURGERY | Age: 79
End: 2024-07-09

## 2024-07-09 VITALS — RESPIRATION RATE: 19 BRPM | TEMPERATURE: 97.9 F | HEART RATE: 78 BPM | OXYGEN SATURATION: 98 %

## 2024-07-09 DIAGNOSIS — M17.0 ARTHRITIS OF BOTH KNEES: Primary | ICD-10-CM

## 2024-07-09 RX ORDER — TRIAMCINOLONE ACETONIDE 40 MG/ML
40 INJECTION, SUSPENSION INTRA-ARTICULAR; INTRAMUSCULAR ONCE
Status: SHIPPED | OUTPATIENT
Start: 2024-07-09

## 2024-07-09 NOTE — PATIENT INSTRUCTIONS
Continue weight-bearing as tolerated.  Continue range of motion exercises as instructed.  Ice and elevate as needed.  Tylenol or Motrin for pain.  Injection given in right knee  Follow up in 4 weeks

## 2024-07-10 DIAGNOSIS — E78.2 MIXED HYPERLIPIDEMIA: ICD-10-CM

## 2024-07-10 RX ORDER — ATORVASTATIN CALCIUM 40 MG/1
TABLET, FILM COATED ORAL
Qty: 90 TABLET | Refills: 1 | Status: SHIPPED | OUTPATIENT
Start: 2024-07-10

## 2024-07-10 ASSESSMENT — ENCOUNTER SYMPTOMS
WHEEZING: 0
EYE PAIN: 0
VOMITING: 0
EYE REDNESS: 0
SHORTNESS OF BREATH: 0
CHEST TIGHTNESS: 0
BACK PAIN: 1
COLOR CHANGE: 0

## 2024-07-10 NOTE — PROGRESS NOTES
Patient seen in office today for: MONSE knee, R>L. No known injury. Pain is located globally in the right knee, left knee pain is located in the anterior.     Patient reports 8/10 pain.  RICE and medication are effective to alleviate pain and reduce swelling. Pain worsened by: Patient reports painful ROM & weight bearing.     Xrays performed in office today.     
and knee flexion up to 130 degrees with pain at the extremes of motion.  There is mild crepitation at the knee during active range of motion.  There is normal knee alignment.  There is 5 out of 5 strength with knee flexion and extension.  There is no instability to varus or valgus stress testing or anterior and posterior drawer testing.  Sensation is intact to light touch throughout the lower extremity.  There is a moderately positive Mynor's test with tenderness to palpation and pain along the medial joint line.  Skin is intact. Pulses intact    No pain with active range of motion of the hip.  Strength and range of motion of the hip are intact.  No tenderness to palpation at the hip.       Skin:     General: Skin is warm and dry.   Neurological:      Mental Status: She is alert and oriented to person, place, and time.   Psychiatric:         Mood and Affect: Mood normal.         Behavior: Behavior normal.            Diagnostic testing:  X-ray images were reviewed by myself and discussed with the patient:  XR KNEE LEFT (MIN 4 VIEWS)    Result Date: 7/9/2024  4 views of the Left Knee: There is evidence of severe degenerative changes present in all 3 compartments of the knee most significant along the medial compartment where there is advanced joint space collapse and bone on bone articulation with prominent osteophyte formation.  There is subchondral sclerosis present in medial compartment with cortical irregularities on both sides of the joint and mild tibial erosion.  There is a moderately severe varus alignment present.  There are prominent arthritic changes in the patellofemoral joint with joint space narrowing and osteophyte formation.  Normal bone density is present.  Mild soft tissue swelling present at the knee joint.  No fractures or loose bodies identified. Impression: Severe varus tricompartmental arthritis    XR KNEE RIGHT (MIN 4 VIEWS)    Result Date: 7/9/2024  4 views of the Right Knee: There is

## 2024-08-21 DIAGNOSIS — I10 ESSENTIAL HYPERTENSION: ICD-10-CM

## 2024-08-21 RX ORDER — METOPROLOL TARTRATE 50 MG/1
50 TABLET, FILM COATED ORAL 2 TIMES DAILY
Qty: 180 TABLET | Refills: 0 | Status: SHIPPED | OUTPATIENT
Start: 2024-08-21

## 2024-08-21 RX ORDER — CLONIDINE HYDROCHLORIDE 0.2 MG/1
0.2 TABLET ORAL 2 TIMES DAILY
Qty: 180 TABLET | Refills: 1 | Status: SHIPPED | OUTPATIENT
Start: 2024-08-21

## 2024-08-27 ENCOUNTER — OFFICE VISIT (OUTPATIENT)
Dept: INTERNAL MEDICINE CLINIC | Age: 79
End: 2024-08-27
Payer: MEDICARE

## 2024-08-27 VITALS
OXYGEN SATURATION: 97 % | SYSTOLIC BLOOD PRESSURE: 132 MMHG | WEIGHT: 193 LBS | DIASTOLIC BLOOD PRESSURE: 70 MMHG | HEART RATE: 56 BPM | BODY MASS INDEX: 32.12 KG/M2

## 2024-08-27 DIAGNOSIS — Z23 NEED FOR TDAP VACCINATION: ICD-10-CM

## 2024-08-27 DIAGNOSIS — F32.A CHRONIC DEPRESSION: ICD-10-CM

## 2024-08-27 DIAGNOSIS — Z23 NEED FOR SHINGLES VACCINE: ICD-10-CM

## 2024-08-27 DIAGNOSIS — E11.65 TYPE 2 DIABETES MELLITUS WITH HYPERGLYCEMIA, WITHOUT LONG-TERM CURRENT USE OF INSULIN (HCC): ICD-10-CM

## 2024-08-27 DIAGNOSIS — N18.4 STAGE 4 CHRONIC KIDNEY DISEASE (HCC): ICD-10-CM

## 2024-08-27 DIAGNOSIS — Z86.73 HISTORY OF ISCHEMIC STROKE: ICD-10-CM

## 2024-08-27 DIAGNOSIS — E03.4 HYPOTHYROIDISM DUE TO ACQUIRED ATROPHY OF THYROID: ICD-10-CM

## 2024-08-27 DIAGNOSIS — M17.0 ARTHRITIS OF BOTH KNEES: ICD-10-CM

## 2024-08-27 DIAGNOSIS — Z72.0 TOBACCO ABUSE: ICD-10-CM

## 2024-08-27 DIAGNOSIS — G25.81 RLS (RESTLESS LEGS SYNDROME): ICD-10-CM

## 2024-08-27 DIAGNOSIS — F41.9 ANXIETY: ICD-10-CM

## 2024-08-27 DIAGNOSIS — E78.2 MIXED HYPERLIPIDEMIA: ICD-10-CM

## 2024-08-27 DIAGNOSIS — Z23 NEED FOR PNEUMOCOCCAL 20-VALENT CONJUGATE VACCINATION: ICD-10-CM

## 2024-08-27 DIAGNOSIS — I10 ESSENTIAL HYPERTENSION: Primary | ICD-10-CM

## 2024-08-27 LAB
CHP ED QC CHECK: NORMAL
GLUCOSE BLD-MCNC: 258 MG/DL
HBA1C MFR BLD: 9.2 %

## 2024-08-27 PROCEDURE — 82962 GLUCOSE BLOOD TEST: CPT | Performed by: INTERNAL MEDICINE

## 2024-08-27 PROCEDURE — G8417 CALC BMI ABV UP PARAM F/U: HCPCS | Performed by: INTERNAL MEDICINE

## 2024-08-27 PROCEDURE — 83036 HEMOGLOBIN GLYCOSYLATED A1C: CPT | Performed by: INTERNAL MEDICINE

## 2024-08-27 PROCEDURE — G8400 PT W/DXA NO RESULTS DOC: HCPCS | Performed by: INTERNAL MEDICINE

## 2024-08-27 PROCEDURE — G8427 DOCREV CUR MEDS BY ELIG CLIN: HCPCS | Performed by: INTERNAL MEDICINE

## 2024-08-27 PROCEDURE — 99214 OFFICE O/P EST MOD 30 MIN: CPT | Performed by: INTERNAL MEDICINE

## 2024-08-27 PROCEDURE — 3046F HEMOGLOBIN A1C LEVEL >9.0%: CPT | Performed by: INTERNAL MEDICINE

## 2024-08-27 PROCEDURE — 1123F ACP DISCUSS/DSCN MKR DOCD: CPT | Performed by: INTERNAL MEDICINE

## 2024-08-27 PROCEDURE — 3078F DIAST BP <80 MM HG: CPT | Performed by: INTERNAL MEDICINE

## 2024-08-27 PROCEDURE — 3075F SYST BP GE 130 - 139MM HG: CPT | Performed by: INTERNAL MEDICINE

## 2024-08-27 PROCEDURE — 4004F PT TOBACCO SCREEN RCVD TLK: CPT | Performed by: INTERNAL MEDICINE

## 2024-08-27 PROCEDURE — 1090F PRES/ABSN URINE INCON ASSESS: CPT | Performed by: INTERNAL MEDICINE

## 2024-08-27 RX ORDER — PIOGLITAZONEHYDROCHLORIDE 30 MG/1
30 TABLET ORAL DAILY
Qty: 30 TABLET | Refills: 3 | Status: SHIPPED | OUTPATIENT
Start: 2024-08-27

## 2024-08-27 RX ORDER — ZOSTER VACCINE RECOMBINANT, ADJUVANTED 50 MCG/0.5
0.5 KIT INTRAMUSCULAR SEE ADMIN INSTRUCTIONS
Qty: 0.5 ML | Refills: 0 | Status: SHIPPED | OUTPATIENT
Start: 2024-08-27 | End: 2025-02-23

## 2024-08-27 NOTE — PROGRESS NOTES
Amaryl and Jardiance and also add Actos.  Advised low sugar diet, exercise and weight loss  - POCT Glucose  - POCT glycosylated hemoglobin (Hb A1C)  - pioglitazone (ACTOS) 30 MG tablet; Take 1 tablet by mouth daily  Dispense: 30 tablet; Refill: 3    5. Tobacco abuse  Advised extensively to quit smoking.  Patient will try on her own to cut back on smoking.    6. Hypothyroidism due to acquired atrophy of thyroid  Continue Synthroid    7. Chronic depression  Patient on Zoloft    8. Anxiety  Continue BuSpar and Klonopin    9. Stage 4 chronic kidney disease (HCC)  Avoid NSAID and keep well-hydrated and keep following with the nephrologist as per his recommendations      09. Arthritis of both knees  Continue Tylenol as needed and also continue to follow with her orthopedic doctor as per his recommendations.    10. Need for pneumococcal 20-valent conjugate vaccination  Advised to get a Prevnar 20 vaccination soon  - pneumococcal 20-valent conjugat (PREVNAR) 0.5 ML MILTON inj; Inject 0.5 mLs into the muscle once for 1 dose  Dispense: 0.5 mL; Refill: 0    12. Need for Tdap vaccination  Advised to get Tdap vaccination seen  - Tetanus-Diphth-Acell Pertussis (BOOSTRIX) 5-2.5-18.5 LF-MCG/0.5 injection; Inject 0.5 mLs into the muscle once for 1 dose  Dispense: 1 each; Refill: 0    13. Need for shingles vaccine  Advised to get Shingrix vaccine soon.  - zoster recombinant adjuvanted vaccine (SHINGRIX) 50 MCG/0.5ML SUSR injection; Inject 0.5 mLs into the muscle See Admin Instructions 1 dose now and repeat in 2-6 months  Dispense: 0.5 mL; Refill: 0            Care discussed with patient. Questions answered and patient verbalizes understanding and agrees with plan.   Medications reviewed and reconciled. Continue current medications.  Appropriate prescriptions are ordered. Risks and benefits of meds are discussed.     After visit summary provided.    Advised to call for any problems, questions, or concerns.   If symptoms worsen or don't

## 2024-12-05 ENCOUNTER — OFFICE VISIT (OUTPATIENT)
Dept: INTERNAL MEDICINE CLINIC | Age: 79
End: 2024-12-05
Payer: MEDICARE

## 2024-12-05 VITALS
BODY MASS INDEX: 32.78 KG/M2 | SYSTOLIC BLOOD PRESSURE: 128 MMHG | OXYGEN SATURATION: 97 % | HEART RATE: 58 BPM | WEIGHT: 197 LBS | DIASTOLIC BLOOD PRESSURE: 84 MMHG

## 2024-12-05 DIAGNOSIS — E78.2 MIXED HYPERLIPIDEMIA: ICD-10-CM

## 2024-12-05 DIAGNOSIS — E03.4 HYPOTHYROIDISM DUE TO ACQUIRED ATROPHY OF THYROID: ICD-10-CM

## 2024-12-05 DIAGNOSIS — M17.0 ARTHRITIS OF BOTH KNEES: ICD-10-CM

## 2024-12-05 DIAGNOSIS — I10 ESSENTIAL HYPERTENSION: ICD-10-CM

## 2024-12-05 DIAGNOSIS — Z72.0 TOBACCO ABUSE: ICD-10-CM

## 2024-12-05 DIAGNOSIS — N18.4 STAGE 4 CHRONIC KIDNEY DISEASE (HCC): ICD-10-CM

## 2024-12-05 DIAGNOSIS — Z86.73 HISTORY OF ISCHEMIC STROKE: ICD-10-CM

## 2024-12-05 DIAGNOSIS — F41.9 ANXIETY: ICD-10-CM

## 2024-12-05 DIAGNOSIS — E11.65 TYPE 2 DIABETES MELLITUS WITH HYPERGLYCEMIA, WITHOUT LONG-TERM CURRENT USE OF INSULIN (HCC): Primary | ICD-10-CM

## 2024-12-05 DIAGNOSIS — F32.A CHRONIC DEPRESSION: ICD-10-CM

## 2024-12-05 PROCEDURE — 82962 GLUCOSE BLOOD TEST: CPT | Performed by: INTERNAL MEDICINE

## 2024-12-05 PROCEDURE — 1123F ACP DISCUSS/DSCN MKR DOCD: CPT | Performed by: INTERNAL MEDICINE

## 2024-12-05 PROCEDURE — G8484 FLU IMMUNIZE NO ADMIN: HCPCS | Performed by: INTERNAL MEDICINE

## 2024-12-05 PROCEDURE — G8427 DOCREV CUR MEDS BY ELIG CLIN: HCPCS | Performed by: INTERNAL MEDICINE

## 2024-12-05 PROCEDURE — 3079F DIAST BP 80-89 MM HG: CPT | Performed by: INTERNAL MEDICINE

## 2024-12-05 PROCEDURE — 83036 HEMOGLOBIN GLYCOSYLATED A1C: CPT | Performed by: INTERNAL MEDICINE

## 2024-12-05 PROCEDURE — 1159F MED LIST DOCD IN RCRD: CPT | Performed by: INTERNAL MEDICINE

## 2024-12-05 PROCEDURE — 99214 OFFICE O/P EST MOD 30 MIN: CPT | Performed by: INTERNAL MEDICINE

## 2024-12-05 PROCEDURE — 3046F HEMOGLOBIN A1C LEVEL >9.0%: CPT | Performed by: INTERNAL MEDICINE

## 2024-12-05 PROCEDURE — G8417 CALC BMI ABV UP PARAM F/U: HCPCS | Performed by: INTERNAL MEDICINE

## 2024-12-05 PROCEDURE — G8400 PT W/DXA NO RESULTS DOC: HCPCS | Performed by: INTERNAL MEDICINE

## 2024-12-05 PROCEDURE — 1090F PRES/ABSN URINE INCON ASSESS: CPT | Performed by: INTERNAL MEDICINE

## 2024-12-05 PROCEDURE — 3074F SYST BP LT 130 MM HG: CPT | Performed by: INTERNAL MEDICINE

## 2024-12-05 PROCEDURE — 4004F PT TOBACCO SCREEN RCVD TLK: CPT | Performed by: INTERNAL MEDICINE

## 2024-12-05 NOTE — PROGRESS NOTES
Name: Mckenna Selby  [id number]  Age: 79 y.o.  YOB: 1945  Sex: female    CHIEF COMPLAINT:    Chief Complaint   Patient presents with    3 Month Follow-Up     Routine visit.    Panic Attack     Pt experiencing insomnia last night with panic attacks.        HISTORY OF PRESENT ILLNESS:     This is a pleasant  79 y.o. female  is seen today for management of chronic medical problems and medications refills.  Previous records reviewed .      She claims that she is doing better but her sugars are still running high .  She claimed that she is getting Jardiance from pharmaceutical company   Dr. Rendon is helping her to get Jardiance from the pharmaceutical company.  She cannot afford Januvia or Ozempic etc. her insurance does not cover these medications.        Denies CP or SOB.  No fever , sore throat or cough or congestion.     Denies any abdominal pain.  Appetite OK.  Bowels moving Ok.  No urinary symptoms.     Still complains of significant pain in her knees, right more than the left.  Takes Tylenol for it. and also uses Voltaren gel.  She did see Dr. Santana recently and he gave her an injection in her right knee which helped her somewhat.     Hearing is ok.  Vision Ok with glasses.  Denies  any significant skin lesions.    She is not checking her sugars regularly at home.      Dr. Rendon sees her periodically for her CKD.     She is still smoking @ 1 PPD.   Does not want any medication to quit smoking at this time but she will try to slow down on smoking.    She is seeing Dr. Ravi for her anxiety and depression and taking medication for him now.     No other new complaints.     She was  vaccinated for COVID-19 x2. But has not taken booster dose yet.  Did not get the Flu shot also.     Labs from 11/29/2023 and 2/22/2024  and 10/18/2024 were reviewed and explained to the patient  Last hemoglobin A1c on 8/27/2024 was 9.2.   She refuses to have a mammogram or bone density..     She was given a

## 2024-12-27 DIAGNOSIS — E03.4 HYPOTHYROIDISM DUE TO ACQUIRED ATROPHY OF THYROID: ICD-10-CM

## 2024-12-27 DIAGNOSIS — I10 ESSENTIAL HYPERTENSION: ICD-10-CM

## 2024-12-30 RX ORDER — LEVOTHYROXINE SODIUM 88 UG/1
88 TABLET ORAL DAILY
Qty: 90 TABLET | Refills: 1 | Status: SHIPPED | OUTPATIENT
Start: 2024-12-30

## 2024-12-30 RX ORDER — CHLORTHALIDONE 25 MG/1
25 TABLET ORAL DAILY
Qty: 90 TABLET | Refills: 1 | Status: SHIPPED | OUTPATIENT
Start: 2024-12-30

## 2025-01-04 DIAGNOSIS — E11.65 TYPE 2 DIABETES MELLITUS WITH HYPERGLYCEMIA, WITHOUT LONG-TERM CURRENT USE OF INSULIN (HCC): ICD-10-CM

## 2025-01-04 DIAGNOSIS — I10 ESSENTIAL HYPERTENSION: ICD-10-CM

## 2025-01-06 RX ORDER — PIOGLITAZONE 30 MG/1
30 TABLET ORAL DAILY
Qty: 90 TABLET | Refills: 1 | Status: SHIPPED | OUTPATIENT
Start: 2025-01-06

## 2025-01-06 RX ORDER — METOPROLOL TARTRATE 50 MG
50 TABLET ORAL 2 TIMES DAILY
Qty: 180 TABLET | Refills: 1 | Status: SHIPPED | OUTPATIENT
Start: 2025-01-06

## 2025-01-10 DIAGNOSIS — E78.2 MIXED HYPERLIPIDEMIA: ICD-10-CM

## 2025-01-10 DIAGNOSIS — I10 ESSENTIAL HYPERTENSION: ICD-10-CM

## 2025-01-10 RX ORDER — LISINOPRIL 40 MG/1
40 TABLET ORAL DAILY
Qty: 90 TABLET | Refills: 3 | Status: SHIPPED | OUTPATIENT
Start: 2025-01-10

## 2025-01-10 RX ORDER — ATORVASTATIN CALCIUM 40 MG/1
TABLET, FILM COATED ORAL
Qty: 90 TABLET | Refills: 1 | Status: SHIPPED | OUTPATIENT
Start: 2025-01-10

## 2025-02-12 DIAGNOSIS — Z86.73 HISTORY OF ISCHEMIC STROKE: ICD-10-CM

## 2025-02-12 RX ORDER — CLOPIDOGREL BISULFATE 75 MG/1
75 TABLET ORAL EVERY MORNING
Qty: 90 TABLET | Refills: 1 | Status: SHIPPED | OUTPATIENT
Start: 2025-02-12

## 2025-02-26 DIAGNOSIS — I10 ESSENTIAL HYPERTENSION: ICD-10-CM

## 2025-02-26 RX ORDER — CLONIDINE HYDROCHLORIDE 0.2 MG/1
0.2 TABLET ORAL 2 TIMES DAILY
Qty: 180 TABLET | Refills: 1 | Status: SHIPPED | OUTPATIENT
Start: 2025-02-26

## 2025-05-28 ENCOUNTER — HOSPITAL ENCOUNTER (OUTPATIENT)
Age: 80
Setting detail: OBSERVATION
Discharge: HOME | End: 2025-05-30
Attending: STUDENT IN AN ORGANIZED HEALTH CARE EDUCATION/TRAINING PROGRAM | Admitting: HOSPITALIST
Payer: MEDICARE

## 2025-05-28 DIAGNOSIS — E16.2 HYPOGLYCEMIA: Primary | ICD-10-CM

## 2025-05-28 LAB
ANION GAP SERPL CALCULATED.3IONS-SCNC: 14 MMOL/L (ref 9–17)
BASOPHILS # BLD: 0.05 K/UL
BASOPHILS NFR BLD: 1 % (ref 0–1)
BUN SERPL-MCNC: 40 MG/DL (ref 7–20)
CALCIUM SERPL-MCNC: 8.3 MG/DL (ref 8.3–10.6)
CHLORIDE SERPL-SCNC: 112 MMOL/L (ref 99–110)
CO2 SERPL-SCNC: 16 MMOL/L (ref 21–32)
CREAT SERPL-MCNC: 1.7 MG/DL (ref 0.6–1.2)
EOSINOPHIL # BLD: 0.09 K/UL
EOSINOPHILS RELATIVE PERCENT: 1 % (ref 0–3)
ERYTHROCYTE [DISTWIDTH] IN BLOOD BY AUTOMATED COUNT: 16.5 % (ref 11.7–14.9)
GFR, ESTIMATED: 29 ML/MIN/1.73M2
GLUCOSE BLD-MCNC: 103 MG/DL (ref 74–99)
GLUCOSE BLD-MCNC: 111 MG/DL (ref 74–99)
GLUCOSE BLD-MCNC: 133 MG/DL (ref 74–99)
GLUCOSE BLD-MCNC: 146 MG/DL
GLUCOSE BLD-MCNC: 146 MG/DL (ref 74–99)
GLUCOSE BLD-MCNC: 93 MG/DL (ref 74–99)
GLUCOSE SERPL-MCNC: 52 MG/DL (ref 74–99)
HCT VFR BLD AUTO: 47.5 % (ref 37–47)
HGB BLD-MCNC: 15.1 G/DL (ref 12.5–16)
IMM GRANULOCYTES # BLD AUTO: 0.03 K/UL
IMM GRANULOCYTES NFR BLD: 0 %
LYMPHOCYTES NFR BLD: 0.67 K/UL
LYMPHOCYTES RELATIVE PERCENT: 8 % (ref 24–44)
MCH RBC QN AUTO: 31.8 PG (ref 27–31)
MCHC RBC AUTO-ENTMCNC: 31.8 G/DL (ref 32–36)
MCV RBC AUTO: 100 FL (ref 78–100)
MONOCYTES NFR BLD: 0.69 K/UL
MONOCYTES NFR BLD: 8 % (ref 0–5)
NEUTROPHILS NFR BLD: 83 % (ref 36–66)
NEUTS SEG NFR BLD: 7.31 K/UL
PLATELET # BLD AUTO: 218 K/UL (ref 140–440)
PMV BLD AUTO: 10.2 FL (ref 7.5–11.1)
POTASSIUM SERPL-SCNC: 4.4 MMOL/L (ref 3.5–5.1)
RBC # BLD AUTO: 4.75 M/UL (ref 4.2–5.4)
SODIUM SERPL-SCNC: 142 MMOL/L (ref 136–145)
WBC OTHER # BLD: 8.8 K/UL (ref 4–10.5)

## 2025-05-28 PROCEDURE — 82962 GLUCOSE BLOOD TEST: CPT

## 2025-05-28 PROCEDURE — 96360 HYDRATION IV INFUSION INIT: CPT

## 2025-05-28 PROCEDURE — 80048 BASIC METABOLIC PNL TOTAL CA: CPT

## 2025-05-28 PROCEDURE — 6370000000 HC RX 637 (ALT 250 FOR IP): Performed by: HOSPITALIST

## 2025-05-28 PROCEDURE — G0378 HOSPITAL OBSERVATION PER HR: HCPCS

## 2025-05-28 PROCEDURE — 96372 THER/PROPH/DIAG INJ SC/IM: CPT

## 2025-05-28 PROCEDURE — 85025 COMPLETE CBC W/AUTO DIFF WBC: CPT

## 2025-05-28 PROCEDURE — 2500000003 HC RX 250 WO HCPCS: Performed by: HOSPITALIST

## 2025-05-28 PROCEDURE — 2580000003 HC RX 258

## 2025-05-28 PROCEDURE — 83036 HEMOGLOBIN GLYCOSYLATED A1C: CPT

## 2025-05-28 PROCEDURE — 99285 EMERGENCY DEPT VISIT HI MDM: CPT

## 2025-05-28 PROCEDURE — 6360000002 HC RX W HCPCS: Performed by: HOSPITALIST

## 2025-05-28 PROCEDURE — 97530 THERAPEUTIC ACTIVITIES: CPT

## 2025-05-28 PROCEDURE — 97162 PT EVAL MOD COMPLEX 30 MIN: CPT

## 2025-05-28 PROCEDURE — 93005 ELECTROCARDIOGRAM TRACING: CPT | Performed by: STUDENT IN AN ORGANIZED HEALTH CARE EDUCATION/TRAINING PROGRAM

## 2025-05-28 RX ORDER — CLOPIDOGREL BISULFATE 75 MG/1
75 TABLET ORAL EVERY MORNING
Status: DISCONTINUED | OUTPATIENT
Start: 2025-05-28 | End: 2025-05-30 | Stop reason: HOSPADM

## 2025-05-28 RX ORDER — LISINOPRIL 20 MG/1
40 TABLET ORAL DAILY
Status: DISCONTINUED | OUTPATIENT
Start: 2025-05-28 | End: 2025-05-30 | Stop reason: HOSPADM

## 2025-05-28 RX ORDER — DEXTROSE MONOHYDRATE 100 MG/ML
INJECTION, SOLUTION INTRAVENOUS CONTINUOUS PRN
Status: DISCONTINUED | OUTPATIENT
Start: 2025-05-28 | End: 2025-05-28 | Stop reason: SDUPTHER

## 2025-05-28 RX ORDER — GLIPIZIDE 5 MG/1
10 TABLET ORAL
Status: DISCONTINUED | OUTPATIENT
Start: 2025-05-29 | End: 2025-05-30 | Stop reason: HOSPADM

## 2025-05-28 RX ORDER — GLUCAGON 1 MG/ML
1 KIT INJECTION PRN
Status: DISCONTINUED | OUTPATIENT
Start: 2025-05-28 | End: 2025-05-30 | Stop reason: HOSPADM

## 2025-05-28 RX ORDER — DEXTROSE MONOHYDRATE 100 MG/ML
INJECTION, SOLUTION INTRAVENOUS CONTINUOUS PRN
Status: DISCONTINUED | OUTPATIENT
Start: 2025-05-28 | End: 2025-05-30 | Stop reason: HOSPADM

## 2025-05-28 RX ORDER — LEVOTHYROXINE SODIUM 88 UG/1
88 TABLET ORAL DAILY
Status: DISCONTINUED | OUTPATIENT
Start: 2025-05-28 | End: 2025-05-30 | Stop reason: HOSPADM

## 2025-05-28 RX ORDER — MAGNESIUM SULFATE 1 G/100ML
1000 INJECTION INTRAVENOUS PRN
Status: DISCONTINUED | OUTPATIENT
Start: 2025-05-28 | End: 2025-05-30 | Stop reason: HOSPADM

## 2025-05-28 RX ORDER — SODIUM CHLORIDE 0.9 % (FLUSH) 0.9 %
5-40 SYRINGE (ML) INJECTION PRN
Status: DISCONTINUED | OUTPATIENT
Start: 2025-05-28 | End: 2025-05-30 | Stop reason: HOSPADM

## 2025-05-28 RX ORDER — ONDANSETRON 2 MG/ML
4 INJECTION INTRAMUSCULAR; INTRAVENOUS EVERY 6 HOURS PRN
Status: DISCONTINUED | OUTPATIENT
Start: 2025-05-28 | End: 2025-05-30 | Stop reason: HOSPADM

## 2025-05-28 RX ORDER — ATORVASTATIN CALCIUM 40 MG/1
40 TABLET, FILM COATED ORAL DAILY
Status: DISCONTINUED | OUTPATIENT
Start: 2025-05-28 | End: 2025-05-30 | Stop reason: HOSPADM

## 2025-05-28 RX ORDER — POTASSIUM CHLORIDE 7.45 MG/ML
10 INJECTION INTRAVENOUS PRN
Status: DISCONTINUED | OUTPATIENT
Start: 2025-05-28 | End: 2025-05-30 | Stop reason: HOSPADM

## 2025-05-28 RX ORDER — CLONIDINE HYDROCHLORIDE 0.1 MG/1
0.2 TABLET ORAL 2 TIMES DAILY
Status: DISCONTINUED | OUTPATIENT
Start: 2025-05-28 | End: 2025-05-30 | Stop reason: HOSPADM

## 2025-05-28 RX ORDER — CLONAZEPAM 0.5 MG/1
2 TABLET ORAL NIGHTLY PRN
Status: DISCONTINUED | OUTPATIENT
Start: 2025-05-28 | End: 2025-05-28

## 2025-05-28 RX ORDER — PIOGLITAZONE 30 MG/1
30 TABLET ORAL DAILY
Status: DISCONTINUED | OUTPATIENT
Start: 2025-05-28 | End: 2025-05-30 | Stop reason: HOSPADM

## 2025-05-28 RX ORDER — QUETIAPINE FUMARATE 25 MG/1
75 TABLET, FILM COATED ORAL NIGHTLY
COMMUNITY

## 2025-05-28 RX ORDER — DEXTROSE MONOHYDRATE 50 MG/ML
INJECTION, SOLUTION INTRAVENOUS CONTINUOUS
Status: DISCONTINUED | OUTPATIENT
Start: 2025-05-28 | End: 2025-05-29

## 2025-05-28 RX ORDER — QUETIAPINE FUMARATE 25 MG/1
25 TABLET, FILM COATED ORAL 2 TIMES DAILY
Status: DISCONTINUED | OUTPATIENT
Start: 2025-05-28 | End: 2025-05-30 | Stop reason: HOSPADM

## 2025-05-28 RX ORDER — POTASSIUM CHLORIDE 1500 MG/1
40 TABLET, EXTENDED RELEASE ORAL PRN
Status: DISCONTINUED | OUTPATIENT
Start: 2025-05-28 | End: 2025-05-30 | Stop reason: HOSPADM

## 2025-05-28 RX ORDER — SODIUM CHLORIDE 0.9 % (FLUSH) 0.9 %
5-40 SYRINGE (ML) INJECTION EVERY 12 HOURS SCHEDULED
Status: DISCONTINUED | OUTPATIENT
Start: 2025-05-28 | End: 2025-05-30 | Stop reason: HOSPADM

## 2025-05-28 RX ORDER — ASPIRIN 81 MG/1
81 TABLET, CHEWABLE ORAL DAILY
Status: DISCONTINUED | OUTPATIENT
Start: 2025-05-28 | End: 2025-05-30 | Stop reason: HOSPADM

## 2025-05-28 RX ORDER — ACETAMINOPHEN 500 MG
500 TABLET ORAL PRN
Status: DISCONTINUED | OUTPATIENT
Start: 2025-05-28 | End: 2025-05-28 | Stop reason: SDUPTHER

## 2025-05-28 RX ORDER — SPIRONOLACTONE 50 MG/1
25 TABLET, FILM COATED ORAL DAILY
Status: DISCONTINUED | OUTPATIENT
Start: 2025-05-28 | End: 2025-05-30 | Stop reason: HOSPADM

## 2025-05-28 RX ORDER — SODIUM CHLORIDE 9 MG/ML
INJECTION, SOLUTION INTRAVENOUS PRN
Status: DISCONTINUED | OUTPATIENT
Start: 2025-05-28 | End: 2025-05-30 | Stop reason: HOSPADM

## 2025-05-28 RX ORDER — ACETAMINOPHEN 650 MG/1
650 SUPPOSITORY RECTAL EVERY 6 HOURS PRN
Status: DISCONTINUED | OUTPATIENT
Start: 2025-05-28 | End: 2025-05-30 | Stop reason: HOSPADM

## 2025-05-28 RX ORDER — BUSPIRONE HYDROCHLORIDE 5 MG/1
10 TABLET ORAL 3 TIMES DAILY
Status: DISCONTINUED | OUTPATIENT
Start: 2025-05-28 | End: 2025-05-28

## 2025-05-28 RX ORDER — AMLODIPINE BESYLATE 10 MG/1
10 TABLET ORAL DAILY
Status: DISCONTINUED | OUTPATIENT
Start: 2025-05-28 | End: 2025-05-28

## 2025-05-28 RX ORDER — ACETAMINOPHEN 325 MG/1
650 TABLET ORAL EVERY 6 HOURS PRN
Status: DISCONTINUED | OUTPATIENT
Start: 2025-05-28 | End: 2025-05-30 | Stop reason: HOSPADM

## 2025-05-28 RX ORDER — ENOXAPARIN SODIUM 100 MG/ML
30 INJECTION SUBCUTANEOUS DAILY
Status: DISCONTINUED | OUTPATIENT
Start: 2025-05-28 | End: 2025-05-30 | Stop reason: HOSPADM

## 2025-05-28 RX ORDER — METOPROLOL TARTRATE 50 MG
50 TABLET ORAL 2 TIMES DAILY
Status: DISCONTINUED | OUTPATIENT
Start: 2025-05-28 | End: 2025-05-30 | Stop reason: HOSPADM

## 2025-05-28 RX ORDER — ONDANSETRON 4 MG/1
4 TABLET, ORALLY DISINTEGRATING ORAL EVERY 8 HOURS PRN
Status: DISCONTINUED | OUTPATIENT
Start: 2025-05-28 | End: 2025-05-30 | Stop reason: HOSPADM

## 2025-05-28 RX ORDER — POLYETHYLENE GLYCOL 3350 17 G/17G
17 POWDER, FOR SOLUTION ORAL DAILY PRN
Status: DISCONTINUED | OUTPATIENT
Start: 2025-05-28 | End: 2025-05-30 | Stop reason: HOSPADM

## 2025-05-28 RX ADMIN — QUETIAPINE FUMARATE 25 MG: 25 TABLET ORAL at 14:18

## 2025-05-28 RX ADMIN — METOPROLOL TARTRATE 50 MG: 50 TABLET, FILM COATED ORAL at 14:23

## 2025-05-28 RX ADMIN — ACETAMINOPHEN 650 MG: 325 TABLET ORAL at 20:49

## 2025-05-28 RX ADMIN — CLONIDINE HYDROCHLORIDE 0.2 MG: 0.1 TABLET ORAL at 14:23

## 2025-05-28 RX ADMIN — DEXTROSE: 5 SOLUTION INTRAVENOUS at 23:09

## 2025-05-28 RX ADMIN — ATORVASTATIN CALCIUM 40 MG: 40 TABLET, FILM COATED ORAL at 14:18

## 2025-05-28 RX ADMIN — ENOXAPARIN SODIUM 30 MG: 100 INJECTION SUBCUTANEOUS at 14:19

## 2025-05-28 RX ADMIN — LISINOPRIL 40 MG: 20 TABLET ORAL at 14:18

## 2025-05-28 RX ADMIN — SODIUM CHLORIDE, PRESERVATIVE FREE 10 ML: 5 INJECTION INTRAVENOUS at 20:51

## 2025-05-28 RX ADMIN — CLOPIDOGREL BISULFATE 75 MG: 75 TABLET, FILM COATED ORAL at 14:18

## 2025-05-28 RX ADMIN — SERTRALINE HYDROCHLORIDE 100 MG: 50 TABLET ORAL at 14:18

## 2025-05-28 RX ADMIN — LEVOTHYROXINE SODIUM 88 MCG: 0.09 TABLET ORAL at 14:19

## 2025-05-28 RX ADMIN — SPIRONOLACTONE 25 MG: 50 TABLET ORAL at 14:18

## 2025-05-28 RX ADMIN — ASPIRIN 81 MG: 81 TABLET, CHEWABLE ORAL at 14:19

## 2025-05-28 RX ADMIN — QUETIAPINE FUMARATE 25 MG: 25 TABLET ORAL at 20:49

## 2025-05-28 ASSESSMENT — PAIN DESCRIPTION - ORIENTATION: ORIENTATION: RIGHT

## 2025-05-28 ASSESSMENT — ENCOUNTER SYMPTOMS
WHEEZING: 0
ABDOMINAL PAIN: 0
VOMITING: 0
SHORTNESS OF BREATH: 0
NAUSEA: 1

## 2025-05-28 ASSESSMENT — PAIN DESCRIPTION - FREQUENCY: FREQUENCY: INTERMITTENT

## 2025-05-28 ASSESSMENT — PAIN SCALES - GENERAL
PAINLEVEL_OUTOF10: 4
PAINLEVEL_OUTOF10: 0

## 2025-05-28 ASSESSMENT — PAIN - FUNCTIONAL ASSESSMENT: PAIN_FUNCTIONAL_ASSESSMENT: PREVENTS OR INTERFERES SOME ACTIVE ACTIVITIES AND ADLS

## 2025-05-28 ASSESSMENT — LIFESTYLE VARIABLES
HOW MANY STANDARD DRINKS CONTAINING ALCOHOL DO YOU HAVE ON A TYPICAL DAY: PATIENT DOES NOT DRINK
HOW OFTEN DO YOU HAVE A DRINK CONTAINING ALCOHOL: NEVER

## 2025-05-28 ASSESSMENT — PAIN DESCRIPTION - PAIN TYPE: TYPE: ACUTE PAIN

## 2025-05-28 ASSESSMENT — PAIN DESCRIPTION - LOCATION: LOCATION: KNEE

## 2025-05-28 ASSESSMENT — PAIN DESCRIPTION - ONSET: ONSET: ON-GOING

## 2025-05-28 NOTE — ED NOTES
Pt daughter at bedside, pt daughter stating a few years ago pt had a TIA and this morning her symptoms were similar to when that happened so she called the squad d/t thinking it may be happening again. Pt daughter stating she does not think pt took her diabetic medication this morning but pt is stating she did. When this RN ask what medication she took this morning, pt is unable to tell me.

## 2025-05-28 NOTE — CARE COORDINATION
CM consulted. Pt's family is requesting that the pt be admitted to a SNF. Pt's PT/OT orders are in. PT/OT evaluations pending. CM to follow for additional DC needs.     Pt has medicare. Printed list of accepting SNF's was provided to the pt and family.

## 2025-05-28 NOTE — CONSULTS
[facility] ACUTE CARE PHYSICAL THERAPY EVALUATION  Mckenna Selby, 1945, OBS [id number], 5/28/2025    History  [place]:  The encounter diagnosis was Hypoglycemia.  Patient  has a past medical history of Acute kidney failure, Chronic kidney disease, Hypertension, Hypothyroidism, and Type 2 diabetes mellitus without complication (HCC).  Patient  has no past surgical history on file.    Recommendation: Facility for moderate post-acute rehabilitation, anticipate 1-2 hours per day and 5 days per week.    Subjective:  Patient states: \"I don't think I'll be running any marathons today!\".    Pain:  denies at rest.    Communication with other providers: GREGG Goldberg approval for PT session  Restrictions: general precautions, falls    Home Setup/Prior level of function  Social/Functional History  Lives With: Son, Daughter  Type of Home: House  Home Layout: Multi-level, Bed/Bath upstairs (Tri-level, 6-7 steps up to bedroom)  Home Access: Stairs to enter without rails  Entrance Stairs - Number of Steps: 1  Bathroom Shower/Tub: Tub/Shower unit  Bathroom Equipment: None  Home Equipment: Cane  Has the patient had two or more falls in the past year or any fall with injury in the past year?: Yes (4+ in past few months, mostly getting in/out of bed)  Receives Help From: Family  Prior Level of Assist for ADLs: Independent (has been needing more help past few months)  Prior Level of Assist for Homemaking: Needs assistance  Prior Level of Assist for Ambulation: Independent household ambulator, with or without device (no AD)  Prior Level of Assist for Transfers: Independent  Active : Yes (hasn't driven much past few months)  Mode of Transportation: Car, Family    Examination of body systems (includes body structures/functions, activity/participation limitations):  Observation:  Supine in bed with daughter present upon arrival, agreeable to therapy  Vision:  WFL  Hearing:  slightly [place]  Cardiopulmonary:  stable vitals

## 2025-05-28 NOTE — ED TRIAGE NOTES
Pt arrived to the ED via EMS from home. On EMS arrival pt BG was 40. Pt received oral glucose per EMS, pt BG on arrival to hospital is 146.

## 2025-05-28 NOTE — ED PROVIDER NOTES
Father     Kidney Disease Father     Cancer Brother         unknown origin    Stroke Maternal Grandmother     Cancer Paternal Grandmother     Other Mother         hysterectomy    No Known Problems Brother        Current Facility-Administered Medications:     glucose chewable tablet 16 g, 4 tablet, Oral, PRN, Jamie Noel MD    dextrose bolus 10% 125 mL, 125 mL, IntraVENous, PRN **OR** dextrose bolus 10% 250 mL, 250 mL, IntraVENous, PRN, Jamie Noel MD    glucagon injection 1 mg, 1 mg, SubCUTAneous, PRN, Jamie Noel MD    dextrose 10 % infusion, , IntraVENous, Continuous PRN, Jamie Noel MD    Current Outpatient Medications:     spironolactone (ALDACTONE) 25 MG tablet, Take 1 tablet by mouth daily, Disp: 90 tablet, Rfl: 1    cloNIDine (CATAPRES) 0.2 MG tablet, TAKE 1 TABLET BY MOUTH TWICE A DAY, Disp: 180 tablet, Rfl: 1    clopidogrel (PLAVIX) 75 MG tablet, TAKE 1 TABLET BY MOUTH EVERY MORNING, Disp: 90 tablet, Rfl: 1    glimepiride (AMARYL) 2 MG tablet, Take 2 tablets by mouth every morning, Disp: 180 tablet, Rfl: 1    lisinopril (PRINIVIL;ZESTRIL) 40 MG tablet, TAKE 1 TABLET BY MOUTH DAILY, Disp: 90 tablet, Rfl: 3    atorvastatin (LIPITOR) 40 MG tablet, TAKE 1 TABLET BY MOUTH EVERY MORNING, Disp: 90 tablet, Rfl: 1    pioglitazone (ACTOS) 30 MG tablet, TAKE 1 TABLET BY MOUTH DAILY, Disp: 90 tablet, Rfl: 1    metoprolol tartrate (LOPRESSOR) 50 MG tablet, TAKE 1 TABLET BY MOUTH 2 TIMES A DAY, Disp: 180 tablet, Rfl: 1    levothyroxine (SYNTHROID) 88 MCG tablet, TAKE 1 TABLET BY MOUTH DAILY, Disp: 90 tablet, Rfl: 1    chlorthalidone (HYGROTON) 25 MG tablet, TAKE 1 TABLET BY MOUTH DAILY, Disp: 90 tablet, Rfl: 1    QUEtiapine (SEROQUEL) 25 MG tablet, Take 1 tablet by mouth 2 times daily, Disp: , Rfl:     empagliflozin (JARDIANCE) 25 MG tablet, Take 1 tablet by mouth daily, Disp: 90 tablet, Rfl: 1    busPIRone (BUSPAR) 10 MG tablet, Take 1 tablet by mouth 3 times daily, Disp: 270 tablet, Rfl:  tablet 0         Nursing Notes Reviewed        PHYSICAL EXAM     ED Triage Vitals   BP Systolic BP Percentile Diastolic BP Percentile Temp Temp Source Pulse Respirations SpO2   -- -- -- 05/28/25 0722 05/28/25 0722 05/28/25 0724 05/28/25 0724 05/28/25 0724      97.8 °F (36.6 °C) Oral 64 18 94 %      Height Weight         -- --                     Triage VS:    ED Triage Vitals   Encounter Vitals Group      BP --       Systolic BP Percentile --       Diastolic BP Percentile --       Pulse 05/28/25 0724 64      Respirations 05/28/25 0724 18      Temp 05/28/25 0722 97.8 °F (36.6 °C)      Temp Source 05/28/25 0722 Oral      SpO2 05/28/25 0724 94 %      Weight --       Height --       Head Circumference --       Peak Flow --       Pain Score --       Pain Loc --       Pain Education --       Exclude from Growth Chart --      Initial vital signs and nursing assessment reviewed and vitals are/show: Afebrile, Hypertensive, Normocardic, and Normal RR.   Pulsoximetry is normal per my interpretation.    Additional Vital Signs:  Vitals:    05/28/25 0731   BP: (!) 163/84   Pulse:    Resp:    Temp:    SpO2:        Physical Exam  Constitutional:       General: She is not in acute distress.     Appearance: Normal appearance. She is not ill-appearing, toxic-appearing or diaphoretic.   HENT:      Head: Normocephalic and atraumatic.      Right Ear: External ear normal.      Left Ear: External ear normal.   Eyes:      General: No scleral icterus.        Right eye: No discharge.         Left eye: No discharge.   Cardiovascular:      Rate and Rhythm: Normal rate and regular rhythm.      Comments: Bilateral radial pulses equal bilateral DP pulses equal  Pulmonary:      Effort: Pulmonary effort is normal. No respiratory distress.      Breath sounds: Normal breath sounds. No stridor. No wheezing, rhonchi or rales.   Chest:      Chest wall: No tenderness.   Abdominal:      General: Abdomen is flat. There is no distension.      Palpations:

## 2025-05-28 NOTE — ED NOTES
Medication History  [facility]    Patient Name: Mckenna Selby 1945     Medication history has been completed by: Karlene Naranjo [facility]    Source(s) of information: retail pharmacy [facility]     Primary Care Physician: Panchito Menjivar MD     Pharmacy: [facility]    Allergies as of 05/28/2025 - Fully Reviewed 05/28/2025   Allergen Reaction Noted    Environmental/seasonal  06/11/2019    Hydroxyzine hcl  04/28/2023        Prior to Admission medications    Medication Sig Start Date End Date Taking? Authorizing Provider   QUEtiapine (SEROQUEL) 25 MG tablet Take 3 tablets by mouth nightly   Yes Toña Oconnor MD   spironolactone (ALDACTONE) 25 MG tablet Take 1 tablet by mouth daily 2/27/25  Yes Andrew Rendon MD   cloNIDine (CATAPRES) 0.2 MG tablet TAKE 1 TABLET BY MOUTH TWICE A DAY 2/26/25  Yes Panchito Menjivar MD   clopidogrel (PLAVIX) 75 MG tablet TAKE 1 TABLET BY MOUTH EVERY MORNING 2/12/25  Yes Panchito Menjivar MD   glimepiride (AMARYL) 2 MG tablet Take 2 tablets by mouth every morning 1/15/25  Yes Andrew Rendon MD   lisinopril (PRINIVIL;ZESTRIL) 40 MG tablet TAKE 1 TABLET BY MOUTH DAILY 1/10/25  Yes Panchito Menjivar MD   atorvastatin (LIPITOR) 40 MG tablet TAKE 1 TABLET BY MOUTH EVERY MORNING 1/10/25  Yes Panchito Menjivar MD   pioglitazone (ACTOS) 30 MG tablet TAKE 1 TABLET BY MOUTH DAILY 1/6/25  Yes Panchito Menjivar MD   metoprolol tartrate (LOPRESSOR) 50 MG tablet TAKE 1 TABLET BY MOUTH 2 TIMES A DAY 1/6/25  Yes Panchito Menjivar MD   levothyroxine (SYNTHROID) 88 MCG tablet TAKE 1 TABLET BY MOUTH DAILY 12/30/24  Yes Panchito Menjivar MD   chlorthalidone (HYGROTON) 25 MG tablet TAKE 1 TABLET BY MOUTH DAILY 12/30/24  Yes Panchito Menjivar MD   QUEtiapine (SEROQUEL) 25 MG tablet Take 1 tablet by mouth every morning   Yes Toña Oconnor MD   empagliflozin (JARDIANCE) 25 MG tablet Take 1 tablet by mouth daily 4/15/24  Yes Andrew Rendon MD   sertraline (ZOLOFT) 100 MG tablet Take 1

## 2025-05-28 NOTE — PROGRESS NOTES
4 Eyes Skin Assessment     NAME:  Mckenna Selby  YOB: 1945  MEDICAL RECORD NUMBER:  [id number]    The patient is being assessed for  Admission    I agree that at least one RN has performed a thorough Head to Toe Skin Assessment on the patient. ALL assessment sites listed below have been assessed.      Areas assessed by both nurses:    Head, Face, Ears, Shoulders, Back, Chest, Arms, Elbows, Hands, Sacrum. Buttock, Coccyx, Ischium, Legs. Feet and Heels, and Under Medical Devices         Does the Patient have a Wound? No noted wound(s)       Savage Prevention initiated by RN: No  Wound Care Orders initiated by RN: No    Pressure Injury (Stage 3,4, Unstageable, DTI, NWPT, and Complex wounds) if present, place Wound referral order by RN under : No    New Ostomies, if present place, Ostomy referral order under : No     Nurse 1 eSignature: Electronically signed by Ashlyn Pascal RN on 5/28/25 at 3:05 PM EDT    **SHARE this note so that the co-signing nurse can place an eSignature**    Nurse 2 eSignature: {Esignature:[id number]}

## 2025-05-28 NOTE — PROGRESS NOTES
LOVENOX PROPHYLAXIS EVALUATION  (Populations not addressed in this protocol: trauma, obstetrics, or COVID-19)    Wt Readings from Last 3 Encounters:   05/28/25 82.8 kg (182 lb 8.7 oz)   01/20/25 90.7 kg (200 lb)   12/05/24 89.4 kg (197 lb)       Estimated Creatinine Clearance: 29 mL/min (A) (based on SCr of 1.7 mg/dL (H)).  Recent Labs     05/28/25  0732   BUN 40*   CREATININE 1.7*      HGB 15.1   HCT 47.5*       Weight Range: .9 kg    CRCL = 15-29.99    50.9 kg   and below     .9  kg   101-150.9 kg   151-174.9  kg   175 kg  or greater     Heparin 5,000 units  subq BID     30mg subq daily       30mg subq  daily   40mg subq  daily   60mg subq daily       Per P/T protocol for appropriate subq anticoagulation by weight and CRCL change to:    Enoxparin 30mg subq daily    CKD IV, will monitor renal function and adjust accordingly    Lashaun Haddad RPH  12:31 PM  05/28/25

## 2025-05-28 NOTE — H&P
V2.0  History and Physical      Name:  Mckenna Selby /Age/Sex: 1945  (79 y.o. female)   MRN & CSN:  [id number] & [id number] Encounter Date/Time: 2025 4:42 PM EDT   Location:  [facility]  PCP: Panchito Menjivar MD       Hospital Day: 1    Assessment and Plan:   Mckenna Selby is a 79 y.o. female who presents with Hypoglycemia    Hypoglycemia-due to taking diabetic medications, and poor oral intake and CKD.  On hypoglycemia protocol.  Encouraged diet.    Hypertensive -resume home clonidine, lisinopril, metoprolol, spironolactone.  CKD 3-baseline appears to be around 1.7.  On IV fluids.  DM2-holding glipizide, Actos, Jardiance due to hypoglycemia.  Hypothyroidism-on levothyroxine.  History of stroke-on aspirin and Plavix.    Patient is admitted as observation.    Comment: Please note this report has been produced using speech recognition software and may contain errors related to that system including errors in grammar, punctuation, and spelling, as well as words and phrases that may be inappropriate. If there are any questions or concerns please feel free to contact the dictating provider for clarification.      Total Encounter Time: 40 minutes  Home Meds Documented: [x] Yes [] Needs Reconciled    Disposition:   Current Living situation: Home  Expected Disposition: TBD  Estimated D/C:     Diet ADULT DIET; Regular; 4 carb choices (60 gm/meal)   DVT Prophylaxis [x] Lovenox,  [] Heparin,  [] SCDs,  [] Eliquis,  [] Xarelto   Code Status  Had discussion with patient about CPR, shocking, and intubation. Patient is a Full Code.    Surrogate Decision Maker/ POA Daughter - Carrie Elizabeth     Personally reviewed Lab Studies and Imaging       History from:     patient, electronic medical record    History of Present Illness:     Chief Complaint: Hypoglycemia  Mckenna Selby is a 79 y.o. female with pmh of DM2, HTN, CKD 3, hypothyroidism who presents with hypoglycemia     Patient was found by her son this morning  MD       Physical Exam:    Physical Exam  Constitutional:       General: She is not in acute distress.     Appearance: She is not ill-appearing, toxic-appearing or diaphoretic.   HENT:      Head: Normocephalic and atraumatic.      Nose: No rhinorrhea.      Mouth/Throat:      Mouth: Mucous membranes are dry.   Eyes:      General:         Right eye: No discharge.         Left eye: No discharge.   Cardiovascular:      Rate and Rhythm: Normal rate and regular rhythm.      Pulses: Normal pulses.      Heart sounds: Normal heart sounds. No murmur heard.     No gallop.   Pulmonary:      Effort: Pulmonary effort is normal. No respiratory distress.      Breath sounds: Normal breath sounds. No wheezing or rales.   Abdominal:      General: Abdomen is flat. There is no distension.      Palpations: Abdomen is soft.      Tenderness: There is no abdominal tenderness. There is no guarding or rebound.   Musculoskeletal:      Cervical back: No rigidity or tenderness.      Right lower leg: No edema.      Left lower leg: No edema.   Lymphadenopathy:      Cervical: No cervical adenopathy.   Skin:     General: Skin is dry.   Neurological:      General: No focal deficit present.      Cranial Nerves: No cranial nerve deficit.   Psychiatric:         Mood and Affect: Mood normal.         Behavior: Behavior normal.         Thought Content: Thought content normal.         Judgment: Judgment normal.            Past Medical History:   PMHx   Past Medical History:   Diagnosis Date   • Acute kidney failure 2/16/2018   • Chronic kidney disease    • Hypertension    • Hypothyroidism    • Type 2 diabetes mellitus without complication (HCC)      PSHX:  has no past surgical history on file.  Allergies:   Allergies   Allergen Reactions   • Environmental/Seasonal    • Hydroxyzine Hcl      Fam HX:  family history includes Cancer in her brother and paternal grandmother; Diabetes in her father; High Blood Pressure in her father; Kidney Disease in her father;

## 2025-05-28 NOTE — ED NOTES
ED TO INPATIENT SBAR HANDOFF    Patient Name: Mckenna Selby   :  1945  79 y.o.   Preferred Name  Mckenna  Family/Caregiver Present yes   Restraints no   C-SSRS: Risk of Suicide: No Risk  Sitter no   Sepsis Risk Score Sepsis V2 Risk Score: 13.3      Situation  Chief Complaint   Patient presents with    Blood Sugar Problem     Brief Description of Patient's Condition: Pt arrived to the ED via EMS from home. On EMS arrival pt BG was 40, pt received oral glucose per EMS.  Mental Status: oriented  Arrived from: home    Imaging:   No orders to display     Abnormal labs:   Abnormal Labs Reviewed   CBC WITH AUTO DIFFERENTIAL - Abnormal; Notable for the following components:       Result Value    Hematocrit 47.5 (*)     MCH 31.8 (*)     MCHC 31.8 (*)     RDW 16.5 (*)     Neutrophils % 83 (*)     Lymphocytes % 8 (*)     Monocytes % 8 (*)     All other components within normal limits   BASIC METABOLIC PANEL - Abnormal; Notable for the following components:    Chloride 112 (*)     CO2 16 (*)     Glucose 52 (*)     BUN 40 (*)     Creatinine 1.7 (*)     Est, Glom Filt Rate 29 (*)     All other components within normal limits   POCT GLUCOSE - Abnormal; Notable for the following components:    POC Glucose 146 (*)     All other components within normal limits   POCT GLUCOSE - Abnormal; Notable for the following components:    POC Glucose 111 (*)     All other components within normal limits        Background  History:   Past Medical History:   Diagnosis Date    Acute kidney failure 2018    Chronic kidney disease     Hypertension     Hypothyroidism     Type 2 diabetes mellitus without complication (HCC)        Assessment    Vitals:    Level of Consciousness: Alert (0)   Vitals:    25 0728 25 0731 25 0808 25 0903   BP: (!) 169/119 (!) 163/84 128/86 102/82   Pulse:   69 61   Resp:   21 19   Temp:       TempSrc:       SpO2:   97% 95%       PO Status: Nothing by Mouth    O2 Flow Rate: O2 Device: None

## 2025-05-29 ENCOUNTER — APPOINTMENT (OUTPATIENT)
Dept: GENERAL RADIOLOGY | Age: 80
End: 2025-05-29
Payer: MEDICARE

## 2025-05-29 LAB
ANION GAP SERPL CALCULATED.3IONS-SCNC: 10 MMOL/L (ref 9–17)
BUN SERPL-MCNC: 40 MG/DL (ref 7–20)
CALCIUM SERPL-MCNC: 8.9 MG/DL (ref 8.3–10.6)
CHLORIDE SERPL-SCNC: 104 MMOL/L (ref 99–110)
CO2 SERPL-SCNC: 20 MMOL/L (ref 21–32)
CREAT SERPL-MCNC: 1.9 MG/DL (ref 0.6–1.2)
EST. AVERAGE GLUCOSE BLD GHB EST-MCNC: 116 MG/DL
GFR, ESTIMATED: 25 ML/MIN/1.73M2
GLUCOSE BLD-MCNC: 114 MG/DL (ref 74–99)
GLUCOSE BLD-MCNC: 160 MG/DL (ref 74–99)
GLUCOSE BLD-MCNC: 163 MG/DL (ref 74–99)
GLUCOSE BLD-MCNC: 195 MG/DL (ref 74–99)
GLUCOSE BLD-MCNC: 63 MG/DL (ref 74–99)
GLUCOSE BLD-MCNC: 98 MG/DL (ref 74–99)
GLUCOSE SERPL-MCNC: 102 MG/DL (ref 74–99)
HBA1C MFR BLD: 5.7 % (ref 4.2–6.3)
POTASSIUM SERPL-SCNC: 4.1 MMOL/L (ref 3.5–5.1)
SODIUM SERPL-SCNC: 134 MMOL/L (ref 136–145)

## 2025-05-29 PROCEDURE — 82962 GLUCOSE BLOOD TEST: CPT

## 2025-05-29 PROCEDURE — 73562 X-RAY EXAM OF KNEE 3: CPT

## 2025-05-29 PROCEDURE — 2580000003 HC RX 258: Performed by: NURSE PRACTITIONER

## 2025-05-29 PROCEDURE — 96361 HYDRATE IV INFUSION ADD-ON: CPT

## 2025-05-29 PROCEDURE — 80048 BASIC METABOLIC PNL TOTAL CA: CPT

## 2025-05-29 PROCEDURE — 97116 GAIT TRAINING THERAPY: CPT

## 2025-05-29 PROCEDURE — 97166 OT EVAL MOD COMPLEX 45 MIN: CPT

## 2025-05-29 PROCEDURE — 96372 THER/PROPH/DIAG INJ SC/IM: CPT

## 2025-05-29 PROCEDURE — 6370000000 HC RX 637 (ALT 250 FOR IP): Performed by: NURSE PRACTITIONER

## 2025-05-29 PROCEDURE — 6370000000 HC RX 637 (ALT 250 FOR IP): Performed by: HOSPITALIST

## 2025-05-29 PROCEDURE — 2500000003 HC RX 250 WO HCPCS: Performed by: HOSPITALIST

## 2025-05-29 PROCEDURE — G0378 HOSPITAL OBSERVATION PER HR: HCPCS

## 2025-05-29 PROCEDURE — 36415 COLL VENOUS BLD VENIPUNCTURE: CPT

## 2025-05-29 PROCEDURE — 6360000002 HC RX W HCPCS: Performed by: HOSPITALIST

## 2025-05-29 PROCEDURE — 94761 N-INVAS EAR/PLS OXIMETRY MLT: CPT

## 2025-05-29 RX ORDER — SODIUM CHLORIDE, SODIUM LACTATE, POTASSIUM CHLORIDE, AND CALCIUM CHLORIDE .6; .31; .03; .02 G/100ML; G/100ML; G/100ML; G/100ML
500 INJECTION, SOLUTION INTRAVENOUS ONCE
Status: COMPLETED | OUTPATIENT
Start: 2025-05-29 | End: 2025-05-29

## 2025-05-29 RX ADMIN — CLONIDINE HYDROCHLORIDE 0.2 MG: 0.1 TABLET ORAL at 20:21

## 2025-05-29 RX ADMIN — ASPIRIN 81 MG: 81 TABLET, CHEWABLE ORAL at 09:40

## 2025-05-29 RX ADMIN — QUETIAPINE FUMARATE 25 MG: 25 TABLET ORAL at 20:21

## 2025-05-29 RX ADMIN — ENOXAPARIN SODIUM 30 MG: 100 INJECTION SUBCUTANEOUS at 09:40

## 2025-05-29 RX ADMIN — METOPROLOL TARTRATE 50 MG: 50 TABLET, FILM COATED ORAL at 20:22

## 2025-05-29 RX ADMIN — SODIUM CHLORIDE, SODIUM LACTATE, POTASSIUM CHLORIDE, AND CALCIUM CHLORIDE 500 ML: .6; .31; .03; .02 INJECTION, SOLUTION INTRAVENOUS at 13:27

## 2025-05-29 RX ADMIN — METOPROLOL TARTRATE 50 MG: 50 TABLET, FILM COATED ORAL at 09:40

## 2025-05-29 RX ADMIN — SPIRONOLACTONE 25 MG: 50 TABLET ORAL at 09:39

## 2025-05-29 RX ADMIN — ATORVASTATIN CALCIUM 40 MG: 40 TABLET, FILM COATED ORAL at 09:39

## 2025-05-29 RX ADMIN — QUETIAPINE FUMARATE 25 MG: 25 TABLET ORAL at 09:39

## 2025-05-29 RX ADMIN — ACETAMINOPHEN 650 MG: 325 TABLET ORAL at 20:21

## 2025-05-29 RX ADMIN — LEVOTHYROXINE SODIUM 88 MCG: 0.09 TABLET ORAL at 09:39

## 2025-05-29 RX ADMIN — ACETAMINOPHEN 650 MG: 325 TABLET ORAL at 13:13

## 2025-05-29 RX ADMIN — CLONIDINE HYDROCHLORIDE 0.2 MG: 0.1 TABLET ORAL at 09:40

## 2025-05-29 RX ADMIN — SODIUM CHLORIDE, PRESERVATIVE FREE 10 ML: 5 INJECTION INTRAVENOUS at 20:24

## 2025-05-29 RX ADMIN — SERTRALINE HYDROCHLORIDE 100 MG: 50 TABLET ORAL at 09:39

## 2025-05-29 RX ADMIN — CLOPIDOGREL BISULFATE 75 MG: 75 TABLET, FILM COATED ORAL at 09:39

## 2025-05-29 ASSESSMENT — PAIN DESCRIPTION - LOCATION
LOCATION: KNEE
LOCATION: KNEE

## 2025-05-29 ASSESSMENT — PAIN DESCRIPTION - ORIENTATION
ORIENTATION: RIGHT;LEFT
ORIENTATION: RIGHT

## 2025-05-29 ASSESSMENT — PAIN DESCRIPTION - DESCRIPTORS
DESCRIPTORS: ACHING;DISCOMFORT
DESCRIPTORS: ACHING;DISCOMFORT

## 2025-05-29 ASSESSMENT — PAIN SCALES - GENERAL
PAINLEVEL_OUTOF10: 2
PAINLEVEL_OUTOF10: 3
PAINLEVEL_OUTOF10: 0

## 2025-05-29 ASSESSMENT — PAIN - FUNCTIONAL ASSESSMENT
PAIN_FUNCTIONAL_ASSESSMENT: PREVENTS OR INTERFERES SOME ACTIVE ACTIVITIES AND ADLS
PAIN_FUNCTIONAL_ASSESSMENT: ACTIVITIES ARE NOT PREVENTED

## 2025-05-29 ASSESSMENT — PAIN SCALES - WONG BAKER: WONGBAKER_NUMERICALRESPONSE: NO HURT

## 2025-05-29 NOTE — PROGRESS NOTES
Physical Therapy    Physical Therapy Treatment Note  Name: Mckenna Selby MRN: [id number] :   1945   Date:  2025   Admission Date: 2025 [id number]   Restrictions/Precautions:  general precautions, falls  Communication with other providers: RN approval for therapy session, OT Kendall, OT student Lita  Subjective:  Patient states: \"You say all the right things\"  Pain:   Location, Type, Intensity (0/10 to 10/10):  reports some pain in bilateral knees (R>L) at rest, does not provide numerical rating  Objective:    Observation:  Supine in bed with daughter present upon arrival, agreeable to therapy  Objective Measures:  stable vitals on room air    Treatment, including education/measures:  Supine to sit: close SBA with HOB elevated and use of bedrail. Verbal cues for sequencing  Sit to supine: Myrtle for RLE advancement back onto bed. Verbal cues for sequencing    Sit to stand: from EOB to standing at RW, Myrtle for lift assist. Verbal cues for UE placement  Stand to sit: from standing at RW to seated EOB, CGA for safety. Verbal cues for UE placement    Seated balance: Pt completed static sitting at EOB SBA to supervision with no LOB throughout  Standing balance: Pt completed brief period of static and light dynamic standing with BUE support on RW, CGA for safety. Pt able to complete light dynamic marching with no LOB or buckling throughout    Gait: completed ~30ft of gait training with RW, CGA for safety. Pt demonstrated decreased pace with decreased step length and height bilaterally. Pt kept R knee slightly flexed with decreased stance time and reports of R knee pain with ambulation. With turning pt getting too far outside RW, improved with verbal cues. Pt reporting fatigue and requesting to return to room    Educated pt on PT POC, role of PT, progress towards goals    Safety: Pt left in bed with alarm, daughter present, call light within reach, all needs met, gait belt used    Assessment /

## 2025-05-29 NOTE — PROGRESS NOTES
4 Eyes Skin Assessment     NAME:  Mckenna Selby  YOB: 1945  MEDICAL RECORD NUMBER:  [id number]    The patient is being assessed for  Admission    I agree that at least one RN has performed a thorough Head to Toe Skin Assessment on the patient. ALL assessment sites listed below have been assessed.      Areas assessed by both nurses:    Head, Face, Ears, Shoulders, Back, Chest, Arms, Elbows, Hands, Sacrum. Buttock, Coccyx, Ischium, Legs. Feet and Heels, and Under Medical Devices         Does the Patient have a Wound? Yes wound(s) were present on assessment. LDA wound assessment was Initiated and completed by RN Wound on 2 right great toe(nail off) skin tears to left ankle, Right shin, Right Forearm and scattered bruising, Small tear to left inner buttock       Savage Prevention initiated by RN: Yes  Wound Care Orders initiated by RN: Yes    Pressure Injury (Stage 3,4, Unstageable, DTI, NWPT, and Complex wounds) if present, place Wound referral order by RN under : No    New Ostomies, if present place, Ostomy referral order under : Yes     Nurse 1 eSignature: Electronically signed by Lexi Cardenas RN on 5/28/25 at 11:16 PM EDT    **SHARE this note so that the co-signing nurse can place an eSignature**    Nurse 2 eSignature: Electronically signed by Clair Craig RN on 5/29/25 at 12:47 AM EDT

## 2025-05-29 NOTE — PLAN OF CARE
Problem: Chronic Conditions and Co-morbidities  Goal: Patient's chronic conditions and co-morbidity symptoms are monitored and maintained or improved  5/29/2025 1151 by Cheryl Schmid RN  Outcome: Progressing  5/28/2025 2232 by Lexi Cardenas RN  Outcome: Progressing     Problem: Discharge Planning  Goal: Discharge to home or other facility with appropriate resources  5/29/2025 1151 by Cheryl Shcmid RN  Outcome: Progressing  5/28/2025 2232 by Lexi Cardenas RN  Outcome: Progressing     Problem: Safety - Adult  Goal: Free from fall injury  5/29/2025 1151 by Cheryl Schmid RN  Outcome: Progressing  5/28/2025 2232 by Lexi Cardenas RN  Outcome: Progressing     Problem: Pain  Goal: Verbalizes/displays adequate comfort level or baseline comfort level  5/29/2025 1151 by Cheryl Schmid RN  Outcome: Progressing  5/28/2025 2232 by Lexi Cardenas RN  Outcome: Progressing     Problem: Skin/Tissue Integrity  Goal: Skin integrity remains intact  Description: 1.  Monitor for areas of redness and/or skin breakdown2.  Assess vascular access sites hourly3.  Every 4-6 hours minimum:  Change oxygen saturation probe site4.  Every 4-6 hours:  If on nasal continuous positive airway pressure, respiratory therapy assess nares and determine need for appliance change or resting period  Outcome: Progressing

## 2025-05-29 NOTE — PROGRESS NOTES
Patient's BS 64, a recheck is 63. 4 oz of apple juice given,  NP notified. Blood sugar recheck is now 133. Will continue to monitor.

## 2025-05-29 NOTE — CONSULTS
[facility] Nurse  Consult Note       Mckenna Selby  AGE: 79 y.o.   GENDER: female  : 1945  TODAY'S DATE:  2025    Subjective:     Reason for  Evaluation and Assessment: wound care eval.      Mckenna Selby is a 79 y.o. female referred by:   [x] Physician  [] Nursing  [] Other:     Wound Identification:  Wound Type: traumatic and skin tear  Contributing Factors: edema, chronic pressure, decreased mobility, and malnutrition        PAST MEDICAL HISTORY        Diagnosis Date    Acute kidney failure 2018    Chronic kidney disease     Hypertension     Hypothyroidism     Type 2 diabetes mellitus without complication (HCC)        PAST SURGICAL HISTORY    No past surgical history on file.    FAMILY HISTORY    Family History   Problem Relation Age of Onset    Diabetes Father     High Blood Pressure Father     Kidney Disease Father     Cancer Brother         unknown origin    Stroke Maternal Grandmother     Cancer Paternal Grandmother     Other Mother         hysterectomy    No Known Problems Brother        SOCIAL HISTORY    Social History     Tobacco Use    Smoking status: Every Day     Current packs/day: 1.00     Average packs/day: 1 pack/day for 63.4 years (63.4 ttl pk-yrs)     Types: Cigarettes     Start date:     Smokeless tobacco: Never   Vaping Use    Vaping status: Never Used   Substance Use Topics    Alcohol use: No    Drug use: Never       ALLERGIES    Allergies   Allergen Reactions    Environmental/Seasonal     Hydroxyzine Hcl        MEDICATIONS    No current facility-administered medications on file prior to encounter.     Current Outpatient Medications on File Prior to Encounter   Medication Sig Dispense Refill    QUEtiapine (SEROQUEL) 25 MG tablet Take 3 tablets by mouth nightly      spironolactone (ALDACTONE) 25 MG tablet Take 1 tablet by mouth daily 90 tablet 1    cloNIDine (CATAPRES) 0.2 MG tablet TAKE 1 TABLET BY MOUTH TWICE A  tablet 1    clopidogrel (PLAVIX)  O'Clock 0 05/29/25 0817   Undermining Starts ___ O'Clock 0 05/29/25 0817   Undermining Ends___ O'Clock 0 05/29/25 0817   Undermining Maxium Distance (cm) 0 05/29/25 0817   Wound Assessment Pink/red 05/29/25 0817   Drainage Amount Small (< 25%) 05/29/25 0817   Drainage Description Serosanguinous 05/29/25 0817   Odor None 05/29/25 0817   Aminah-wound Assessment Intact 05/29/25 0817   Margins Defined edges 05/29/25 0817   Wound Thickness Description not for Pressure Injury Partial thickness 05/29/25 0817   Number of days: 1       Wound 05/29/25 Toe (Comment  which one) Left great toe (Active)   Wound Image   05/29/25 0817   Wound Etiology Traumatic 05/29/25 0817   Wound Cleansed Cleansed with saline 05/29/25 0817   Dressing/Treatment Open to air 05/29/25 0817   Wound Length (cm) 0.5 cm 05/29/25 0817   Wound Width (cm) 0.5 cm 05/29/25 0817   Wound Depth (cm) 0.1 cm 05/29/25 0817   Wound Surface Area (cm^2) 0.25 cm^2 05/29/25 0817   Wound Volume (cm^3) 0.025 cm^3 05/29/25 0817   Distance Tunneling (cm) 0 cm 05/29/25 0817   Tunneling Position ___ O'Clock 0 05/29/25 0817   Undermining Starts ___ O'Clock 0 05/29/25 0817   Undermining Ends___ O'Clock 0 05/29/25 0817   Undermining Maxium Distance (cm) 0 05/29/25 0817   Wound Assessment Eschar dry 05/29/25 0817   Drainage Amount None (dry) 05/29/25 0817   Aminah-wound Assessment Intact 05/29/25 0817   Margins Attached edges 05/29/25 0817   Number of days: 0       Response to treatment:  Well tolerated by patient.     Pain Assessment:  Severity:  none  Quality of pain:   Wound Pain Timing/Severity:   Premedicated: no    Plan:     Plan of Care: Wound 05/28/25 Knee Right-Dressing/Treatment: Open to air  Wound Ankle Left;Lateral-Dressing/Treatment: Silicone border  Wound 05/28/25 Radial Proximal;Right;Anterior-Dressing/Treatment: Silicone border  Wound 05/28/25 Toe (Comment  which one) Right;Anterior-Dressing/Treatment: Dry dressing  Wound 05/28/25 Buttocks

## 2025-05-29 NOTE — CONSULTS
Comprehensive Nutrition Assessment    Type and Reason for Visit:  Initial, Consult, Wound (Poor Intake/Appetite 5 or More Days)    Nutrition Recommendations/Plan:   Continue carb controlled 5 diet   Will order Diabetic Supplement (Glucerna) TID to provide 220 kcal and 10 grams of protein each   Encourage proper hydration/fluids intake  Please document all PO intakes in I/O   Encourage small snacks between meals as able      Malnutrition Assessment:  Malnutrition Status:  At risk for malnutrition (05/29/25 1263)    Context:  Acute Illness       Nutrition Assessment:    Admitted with hypoglycemia BG of 52 mg/dL (improving). H/o DMII, CKD4, HTN, HLD, Hypothyroidism, Tobacco abuse. Currently on carb controlled diet with poor appetite/po intakes. Po intakes of <50% consistently. Pt has multiple wounds, requiring increased needs. Pt has lost 6.3% BW in 4 months, possibly related to advised dietary changes and reduced oral intake. Will offer supplements until appetite improves and closely monitor as high nutrition risk.    Nutrition Related Findings:    Home meds: Amaryl, Jardiance and Actos for DM mgt which are held; Aldactone; A1c 5.7%, POCT  Wound Type: Multiple, Skin Tears (traumatic)       Current Nutrition Intake & Therapies:    Average Meal Intake: 26-50%, 1-25% (x2 meals)  Average Supplements Intake: None Ordered  ADULT DIET; Regular; 5 carb choices (75 gm/meal)    Anthropometric Measures:  Height: 165.1 cm (5' 5\")  Ideal Body Weight (IBW): 125 lbs (57 kg)    Admission Body Weight: 85 kg (187 lb 6.3 oz)  Current Body Weight: 85 kg (187 lb 6.3 oz), 149.9 % IBW. Weight Source: Bed scale  Current BMI (kg/m2): 31.2  Usual Body Weight: 90.7 kg (200 lb) (1/20/25)     % Weight Change (Calculated): -6.3  Weight Adjustment For: No Adjustment                 BMI Categories: Obese Class 1 (BMI 30.0-34.9)    Estimated Daily Nutrient Needs:  Energy Requirements Based On: Formula  Weight Used for Energy Requirements:

## 2025-05-29 NOTE — PROGRESS NOTES
I did NOT see the patient. The patient was discussed with the SKIP. I was available for questions and consultation as needed.       Stopping dextrose IV and monitor glucose levels.

## 2025-05-29 NOTE — PROGRESS NOTES
V2.0  [facility] Hospitalist Progress Note      Name:  Mckenna Selby /Age/Sex: 1945  (79 y.o. female)   MRN & CSN:  [id number] & [id number] Encounter Date/Time: 2025 8:08 AM EDT    Location:  [facility]  PCP: Panchito Menjivar MD       Hospital Day: 2    Assessment and Plan:   Mckenna Selby is a 79 y.o. female with pmh of  DM2, HTN, CKD 3, hypothyroidism  who presents with Hypoglycemia      Plan:    Hypoglycemia-due to taking diabetic medications, and poor oral intake and CKD.  On hypoglycemia protocol.  Encouraged diet.    HOLD jardiance, glipizide and pioglitazone   Giving IV dextrose 5% at 75 ml/hr - DC'd on  mid day   A1C 5.7%, so likely being over medicated, may discharge her on Jardiance only and DC glipizide and Actos with PCP follow up   POCT Glucose Qac&hs and prn   PT OT Evals : pending   Hypertensive -resume home clonidine, lisinopril, metoprolol, spironolactone  Hypotension on - held lisinpril, gave 500 cc IV fluid bolus and added hold parameters on other meds   CKD 3-baseline appears to be around 1.7.  DM2-holding glipizide, Actos, Jardiance due to hypoglycemia.  Hypothyroidism-on levothyroxine.  History of stroke-on aspirin and Plavix.  Bilateral Knee Pain   Has long hx of OA of bilateral knees, but worsening pain since she fell on them the other day   Will get Xrays of both knees  Skin tear of right shin, ankle and great toe wound  Wound RN consulted        Diet ADULT DIET; Regular; 5 carb choices (75 gm/meal)   DVT Prophylaxis [x] Lovenox, []  Heparin, [] SCDs, [] Ambulation,  [] Eliquis, [] Xarelto  [] Coumadin   Code Status Full Code   Disposition From: Home  Expected Disposition: Home  Estimated Date of Discharge: 1-2 days   Patient requires continued admission due to Low Blood Sugars   Surrogate Decision Maker/ POA Child- Carrie Elizabeth     Personally reviewed Lab Studies and Imaging     Discussed management of the case with Dr. Brie Menjivar.       Drugs that require monitoring

## 2025-05-29 NOTE — PROGRESS NOTES
Occupational Therapy    [facility] ACUTE CARE OCCUPATIONAL THERAPY EVALUATION    Mckenna Selby, 1945, OBS 08/OAL48-07, 5/29/2025    Discharge Recommendation: Encourage minimal to moderate OT services at discharge, typically 1-2 hours/day, 5 days/week    History:  Tribe:  The encounter diagnosis was Hypoglycemia.    Subjective:  Patient states: \"I tend to change my mind a lot.\"  Pain: Pt reported pain in Rt knee and BL LEs but did not quantify  Communication with other providers: PT GREGG Michelle  Restrictions: General Precautions, Fall Risk, Telemetry, IV, Bed/chair alarm    Home Setup/Prior level of function:  Social/Functional History  Lives With: Son, Daughter  Type of Home: House  Home Layout: Multi-level, Bed/Bath upstairs (Tri-level, 6-7 steps up to bedroom)  Home Access: Stairs to enter without rails  Entrance Stairs - Number of Steps: 1  Bathroom Shower/Tub: Tub/Shower unit  Bathroom Equipment: None  Home Equipment: Cane  Has the patient had two or more falls in the past year or any fall with injury in the past year?: Yes (4+ in past few months, mostly getting in/out of bed)  Receives Help From: Family  Prior Level of Assist for ADLs: Independent (but has been needing more help past few months)  Prior Level of Assist for Homemaking: Needs assistance (son or daughter manage)  Prior Level of Assist for Ambulation: Independent household ambulator (uses no AD)  Prior Level of Assist for Transfers: Independent  Active : Yes (hasn't driven much past few months)  Mode of Transportation: Car, Family  Occupation: Retired    Examination:  Observation: Supine in bed upon arrival. Agreeable to evaluation. Daughter present throughout.  Vision: WFL  Hearing: Slightly [place]  Vitals: Stable vitals throughout session on room air    Body Systems and functions:  ROM: Active shoulder flexion grossly 0-110' in BL UEs, WFL distally in BL UEs  Strength: 4-/5 MMT all major muscle groups BL UEs (generally

## 2025-05-30 VITALS
TEMPERATURE: 98.3 F | SYSTOLIC BLOOD PRESSURE: 102 MMHG | OXYGEN SATURATION: 95 % | BODY MASS INDEX: 32.84 KG/M2 | HEART RATE: 60 BPM | HEIGHT: 65 IN | WEIGHT: 197.09 LBS | RESPIRATION RATE: 20 BRPM | DIASTOLIC BLOOD PRESSURE: 45 MMHG

## 2025-05-30 LAB
ANION GAP SERPL CALCULATED.3IONS-SCNC: 9 MMOL/L (ref 9–17)
BUN SERPL-MCNC: 40 MG/DL (ref 7–20)
CALCIUM SERPL-MCNC: 8.9 MG/DL (ref 8.3–10.6)
CHLORIDE SERPL-SCNC: 105 MMOL/L (ref 99–110)
CO2 SERPL-SCNC: 20 MMOL/L (ref 21–32)
CREAT SERPL-MCNC: 1.9 MG/DL (ref 0.6–1.2)
EKG ATRIAL RATE: 65 BPM
EKG DIAGNOSIS: NORMAL
EKG P AXIS: 118 DEGREES
EKG P-R INTERVAL: 208 MS
EKG Q-T INTERVAL: 490 MS
EKG QRS DURATION: 116 MS
EKG QTC CALCULATION (BAZETT): 509 MS
EKG R AXIS: -50 DEGREES
EKG T AXIS: 8 DEGREES
EKG VENTRICULAR RATE: 65 BPM
GFR, ESTIMATED: 25 ML/MIN/1.73M2
GLUCOSE BLD-MCNC: 123 MG/DL (ref 74–99)
GLUCOSE BLD-MCNC: 130 MG/DL (ref 74–99)
GLUCOSE SERPL-MCNC: 129 MG/DL (ref 74–99)
MAGNESIUM SERPL-MCNC: 1.9 MG/DL (ref 1.8–2.4)
POTASSIUM SERPL-SCNC: 4.3 MMOL/L (ref 3.5–5.1)
SODIUM SERPL-SCNC: 134 MMOL/L (ref 136–145)

## 2025-05-30 PROCEDURE — 94761 N-INVAS EAR/PLS OXIMETRY MLT: CPT

## 2025-05-30 PROCEDURE — 36415 COLL VENOUS BLD VENIPUNCTURE: CPT

## 2025-05-30 PROCEDURE — 2500000003 HC RX 250 WO HCPCS: Performed by: HOSPITALIST

## 2025-05-30 PROCEDURE — 6360000002 HC RX W HCPCS: Performed by: HOSPITALIST

## 2025-05-30 PROCEDURE — 6370000000 HC RX 637 (ALT 250 FOR IP): Performed by: HOSPITALIST

## 2025-05-30 PROCEDURE — 82962 GLUCOSE BLOOD TEST: CPT

## 2025-05-30 PROCEDURE — 96372 THER/PROPH/DIAG INJ SC/IM: CPT

## 2025-05-30 PROCEDURE — 93010 ELECTROCARDIOGRAM REPORT: CPT | Performed by: INTERNAL MEDICINE

## 2025-05-30 PROCEDURE — 6370000000 HC RX 637 (ALT 250 FOR IP): Performed by: NURSE PRACTITIONER

## 2025-05-30 PROCEDURE — 83735 ASSAY OF MAGNESIUM: CPT

## 2025-05-30 PROCEDURE — G0378 HOSPITAL OBSERVATION PER HR: HCPCS

## 2025-05-30 PROCEDURE — 80048 BASIC METABOLIC PNL TOTAL CA: CPT

## 2025-05-30 RX ADMIN — LEVOTHYROXINE SODIUM 88 MCG: 0.09 TABLET ORAL at 09:21

## 2025-05-30 RX ADMIN — CLOPIDOGREL BISULFATE 75 MG: 75 TABLET, FILM COATED ORAL at 09:19

## 2025-05-30 RX ADMIN — ASPIRIN 81 MG: 81 TABLET, CHEWABLE ORAL at 09:19

## 2025-05-30 RX ADMIN — SODIUM CHLORIDE, PRESERVATIVE FREE 10 ML: 5 INJECTION INTRAVENOUS at 09:19

## 2025-05-30 RX ADMIN — ATORVASTATIN CALCIUM 40 MG: 40 TABLET, FILM COATED ORAL at 09:19

## 2025-05-30 RX ADMIN — SERTRALINE HYDROCHLORIDE 100 MG: 50 TABLET ORAL at 09:19

## 2025-05-30 RX ADMIN — CLONIDINE HYDROCHLORIDE 0.2 MG: 0.1 TABLET ORAL at 09:19

## 2025-05-30 RX ADMIN — QUETIAPINE FUMARATE 25 MG: 25 TABLET ORAL at 09:19

## 2025-05-30 RX ADMIN — ENOXAPARIN SODIUM 30 MG: 100 INJECTION SUBCUTANEOUS at 09:19

## 2025-05-30 RX ADMIN — METOPROLOL TARTRATE 50 MG: 50 TABLET, FILM COATED ORAL at 09:19

## 2025-05-30 NOTE — DISCHARGE SUMMARY
V2.0  Discharge Summary    Name:  Mckenna Selby /Age/Sex: 1945 (79 y.o. female)   Admit Date: 2025  Discharge Date: 25    MRN & CSN:  [id number] & [id number] Encounter Date and Time 25 2:02 PM EDT    Attending:  Brie Menjivar MD Discharging Provider: SILVIA MILLER [facility] Course:     Brief HPI: Mckenna Selby is a 79 y.o. female who presented with a Fall and Hypoglycemia. Was held for Observation and given IV dextrose 5% and all of her PO diabetes meds were held. Her blood sugars have normalized with diet alone, so only her Jardiance will be continued. Her A1C was 5.7%. her blood pressure was also running low, so we are stopping all four of her BP meds and will have her evaluated by her PCP and Nephrologist to see if they want to restart anything else for her DM-2, CKD or BP.     Brief Problem Based Course:     Hypoglycemia improved- due to taking diabetic medications, and poor oral intake and CKD.  On hypoglycemia protocol.  Encouraged diet.    HOLD jardiance, glipizide and pioglitazone on admission,  restarting Jardiance   Giving IV dextrose 5% at 75 ml/hr - DC'd on  mid day   A1C 5.7%, likely overmedicated as her appetite has been poor, will discharge her on Jardiance only, will need close PCP follow up   POCT Glucose Qac&hs and prn   PT OT Evals : recs for SNF for Rehab- doesn't qualify for either or, will DC home with [facility] and rehab   Hypertensive - with hypotension   Hypotension on - held lisinpril, gave 500 cc IV fluid bolus and added hold parameters on other meds    BP improved - HOLD all BP meds at discharge as her BP is 102/45 with MAP 65  CKD 3-baseline appears to be around 1.7   restarting Jardiance  DM2-holding glipizide, Actos, Jardiance due to hypoglycemia on admission   restarting only Jardiance as sugars and BP have improved   Hypothyroidism-on levothyroxine.  History of stroke-on aspirin and Plavix.  Bilateral Knee Pain -  on 5/30/2025 at 2:12 PM

## 2025-05-30 NOTE — PROGRESS NOTES
Comprehensive Nutrition Assessment    Type and Reason for Visit:  Reassess    Nutrition Recommendations/Plan:   Continue carb controlled 5 diet   Offer diabetic vanilla supplement BID   Encourage proper hydration/fluids intake     Malnutrition Assessment:  Malnutrition Status:  No malnutrition (05/30/25 1428)    Context:  Acute Illness     Findings of the 6 clinical characteristics of malnutrition:  Energy Intake:  Mild decrease in energy intake (poor intakes x 3days)  Weight Loss:   (No weight loss)     Body Fat Loss:  No body fat loss     Muscle Mass Loss:  No muscle mass loss    Fluid Accumulation:  No fluid accumulation     Strength:  Not Performed    Nutrition Assessment:    Pt on carb controlled 5 diet. Pt tried glucerna chocolate, would like to drink vanilla. Reviewed po intakes, appetite is better dos. No weight loss. UBW of 197-198#. Reviewed nutrition for CKD/DM. Pt lives with son, who help with meals as well as dtr. Encourage good nutrition and hydration for healing. Moderate nutrition risk.    Nutrition Related Findings:    Jardiance restarted. POCT 129 Wound Type: Multiple, Skin Tears (traumatic)       Current Nutrition Intake & Therapies:    Average Meal Intake: 51-75%  Average Supplements Intake: 51-75%  ADULT DIET; Regular; 5 carb choices (75 gm/meal)  ADULT ORAL NUTRITION SUPPLEMENT; Breakfast, Lunch, Dinner; Diabetic Oral Supplement    Anthropometric Measures:  Height: 165.1 cm (5' 5\")  Ideal Body Weight (IBW): 125 lbs (57 kg)    Admission Body Weight: 85 kg (187 lb 6.3 oz)  Current Body Weight: 89.4 kg (197 lb 1.5 oz), 157.7 % IBW. Weight Source: Bed scale  Current BMI (kg/m2): 32.8  Usual Body Weight: 90.7 kg (200 lb) (1/20/25)     % Weight Change (Calculated): -6.3  Weight Adjustment For: No Adjustment                 BMI Categories: Obese Class 1 (BMI 30.0-34.9)    Estimated Daily Nutrient Needs:  Energy Requirements Based On: Formula  Weight Used for Energy Requirements: Current  Energy

## 2025-05-30 NOTE — CARE COORDINATION
Pt facesheet and [facility] faxed to [phone] as requested by Alia Humphries of [facility]. KENRN/CM

## 2025-05-30 NOTE — PROGRESS NOTES
V2.0  [facility] Hospitalist Progress Note      Name:  Mckenna Selby /Age/Sex: 1945  (79 y.o. female)   MRN & CSN:  [id number] & [id number] Encounter Date/Time: 2025 8:08 AM EDT    Location:  OBS  PCP: Panchito Menjivar MD       Hospital Day: 3    Assessment and Plan:   Mckenna Selby is a 79 y.o. female with pmh of  DM2, HTN, CKD 3, hypothyroidism  who presents with Hypoglycemia      Plan:    Hypoglycemia-due to taking diabetic medications, and poor oral intake and CKD.  On hypoglycemia protocol.  Encouraged diet.    HOLD jardiance, glipizide and pioglitazone on admission,  restarting Jardiance   Giving IV dextrose 5% at 75 ml/hr - DC'd on  mid day   A1C 5.7%, so likely being over medicated, may discharge her on Jardiance only, will need close PCP follow up   POCT Glucose Qac&hs and prn   PT OT Evals : recs for SNF for Rehab  Hypertensive - with hypotension   Hypotension on - held lisinpril, gave 500 cc IV fluid bolus and added hold parameters on other meds    BP improved- cont metoprolol, clonidine for now, HOLD aldactone due to CrCl 26, cont to hold lisinopril until BP rises more  CKD 3-baseline appears to be around 1.7   restarting Jardiance  DM2-holding glipizide, Actos, Jardiance due to hypoglycemia on admission   restarting Jardiance as sugars and BP have improved   Hypothyroidism-on levothyroxine.  History of stroke-on aspirin and Plavix.  Bilateral Knee Pain - improved   Has long hx of OA of bilateral knees, but worsening pain since she fell on them the other day   Xrays of both knees: show OA changes, no acute fractures   Skin tear of right shin, ankle and great toe wound  Wound RN consulted        Diet ADULT DIET; Regular; 5 carb choices (75 gm/meal)  ADULT ORAL NUTRITION SUPPLEMENT; Breakfast, Lunch, Dinner; Diabetic Oral Supplement   DVT Prophylaxis [x] Lovenox, []  Heparin, [] SCDs, [] Ambulation,  [] Eliquis, [] Xarelto  [] Coumadin   Code Status Full Code

## 2025-05-30 NOTE — PLAN OF CARE
Problem: Chronic Conditions and Co-morbidities  Goal: Patient's chronic conditions and co-morbidity symptoms are monitored and maintained or improved  Outcome: Progressing     Problem: Discharge Planning  Goal: Discharge to home or other facility with appropriate resources  Outcome: Progressing  Flowsheets (Taken 5/29/2025 2029 by Marilee De Guzman RN)  Discharge to home or other facility with appropriate resources: Identify barriers to discharge with patient and caregiver     Problem: Safety - Adult  Goal: Free from fall injury  Outcome: Progressing     Problem: Pain  Goal: Verbalizes/displays adequate comfort level or baseline comfort level  Outcome: Progressing     Problem: Skin/Tissue Integrity  Goal: Skin integrity remains intact  Description: 1.  Monitor for areas of redness and/or skin breakdown2.  Assess vascular access sites hourly3.  Every 4-6 hours minimum:  Change oxygen saturation probe site4.  Every 4-6 hours:  If on nasal continuous positive airway pressure, respiratory therapy assess nares and determine need for appliance change or resting period  Outcome: Progressing  Flowsheets  Taken 5/29/2025 2034 by Marilee De Guzman RN  Skin Integrity Remains Intact: Monitor for areas of redness and/or skin breakdown  Taken 5/29/2025 2029 by Marilee De Guzman RN  Skin Integrity Remains Intact: Monitor for areas of redness and/or skin breakdown     Problem: Nutrition Deficit:  Goal: Optimize nutritional status  Outcome: Progressing

## 2025-05-30 NOTE — DISCHARGE INSTRUCTIONS
We are stopping these meds for low blood sugar: pioglitazone and glipizide     We are stopping these meds for low blood pressure: metoprolol, clonidine, lisinpril and aldactone     Please follow up with your PCP and Nephrologist to see if they want you to restart any of these.     We are only continuing Jardiance 25 mg daily at discharge for both diabetes and chronic kidney disease.

## 2025-05-30 NOTE — CARE COORDINATION
CM received consult in Observation. Patient has been declined from ARU. CM went to room and introduced self and explained job role. Patient and daughter Carrie Elizabeth @ [phone] was in room with patient. Patient and daughter decided on the following SNFs' in this order:    [facility]      Patient has a current pre-cert. CM utilize [facility] and sent over referrals.     Waiting back to hear from response on referrals.     CM spoke to Ritika with [facility]. Ritika received referral, but reported that patient will require a 3 day inpatient stay but at this time; patient is in OBS unit.     ROHAN sent a perfect serve to Dr. Brie Menjivar stating the following: Patient was denied for ARU placement. OT/PT reports that patient does meet criterea for nursing home rehab, but patient needs a 3 day inpatient stay. Does patient meet criterea for inpatient stay? If not... patient will have to pay for Skilled Nursing out of pocket or she will need to go home. Thanks very much! Gela Segura Returned perfect serve and reported that patient does not have enough to be admitted, so patient will need to be discharged today with [facility]. CM discussed with patient and daughter. Patient decided to go with [facility]. CM faxed information as well as utilized [facility] and called [facility].     Patient to be discharged today with [facility] following at home.     Radha Collinss from [facility] sent back a perfect serve stating that she did receive referral but patient has 2 different MRN numbers.  explained that CM unsure about 2 different MRN numbers. Radha reported that they need physician order and will not be able to see patient until Monday or Tuesday. [facility] faxed entire referral [phone] to [facility] including physicians order. Confirmation from fax that [facility] sent came through--was successful.     had already shared with patient and family that [facility] would be reaching out to patient either Monday or Tuesday due to

## 2025-05-30 NOTE — DISCHARGE INSTR - LAB
[facility] will be following up within 48 hours.     Please see: [facility]  [address] · [phone]

## 2025-05-30 NOTE — CARE COORDINATION
Reviewed updated clinicals.  Patient is not ARU appropriate 2/2 not enough medical complexity to be seen by PM&R MD at least 3 times a week per Medicare guidelines.

## 2025-05-30 NOTE — PROGRESS NOTES
I did NOT see the patient. The patient was discussed with the SKIP. I was available for questions and consultation as needed.       Doesn't qualify for SNF. Plan for dc home.

## 2025-06-02 ENCOUNTER — TELEPHONE (OUTPATIENT)
Dept: INTERNAL MEDICINE CLINIC | Age: 80
End: 2025-06-02

## 2025-06-02 NOTE — CARE COORDINATION
Spoke with pcp office pt last seen in Dec 2024 therefore will need to come in the office prior to hhc being initiated. Spoke w/daughter (Carrie) & updated. Carrie verbalized understanding.

## 2025-06-03 ENCOUNTER — TELEPHONE (OUTPATIENT)
Dept: INTERNAL MEDICINE CLINIC | Age: 80
End: 2025-06-03

## 2025-06-03 NOTE — TELEPHONE ENCOUNTER
Daughter Carrie Elizabeth called stating that patient was taken off of some medications in the hospital and want to get clarification on whether she should go back on them or not? Carrie would like a call back.

## 2025-07-03 ENCOUNTER — OFFICE VISIT (OUTPATIENT)
Dept: INTERNAL MEDICINE CLINIC | Age: 80
End: 2025-07-03
Payer: MEDICARE

## 2025-07-03 VITALS
BODY MASS INDEX: 29.79 KG/M2 | HEART RATE: 71 BPM | DIASTOLIC BLOOD PRESSURE: 88 MMHG | WEIGHT: 179 LBS | OXYGEN SATURATION: 95 % | SYSTOLIC BLOOD PRESSURE: 136 MMHG

## 2025-07-03 DIAGNOSIS — N18.4 CKD STAGE G4/A1, GFR 15-29 AND ALBUMIN CREATININE RATIO <30 MG/G (HCC): ICD-10-CM

## 2025-07-03 DIAGNOSIS — Z86.73 HISTORY OF ISCHEMIC STROKE: ICD-10-CM

## 2025-07-03 DIAGNOSIS — I10 ESSENTIAL HYPERTENSION: ICD-10-CM

## 2025-07-03 DIAGNOSIS — N18.4 STAGE 4 CHRONIC KIDNEY DISEASE (HCC): ICD-10-CM

## 2025-07-03 DIAGNOSIS — F32.A CHRONIC DEPRESSION: ICD-10-CM

## 2025-07-03 DIAGNOSIS — Z72.0 TOBACCO ABUSE: ICD-10-CM

## 2025-07-03 DIAGNOSIS — M17.0 ARTHRITIS OF BOTH KNEES: ICD-10-CM

## 2025-07-03 DIAGNOSIS — E03.4 HYPOTHYROIDISM DUE TO ACQUIRED ATROPHY OF THYROID: ICD-10-CM

## 2025-07-03 DIAGNOSIS — F41.9 ANXIETY: ICD-10-CM

## 2025-07-03 DIAGNOSIS — E11.65 TYPE 2 DIABETES MELLITUS WITH HYPERGLYCEMIA, WITHOUT LONG-TERM CURRENT USE OF INSULIN (HCC): Primary | ICD-10-CM

## 2025-07-03 DIAGNOSIS — E78.2 MIXED HYPERLIPIDEMIA: ICD-10-CM

## 2025-07-03 PROCEDURE — 99214 OFFICE O/P EST MOD 30 MIN: CPT | Performed by: INTERNAL MEDICINE

## 2025-07-03 PROCEDURE — 1090F PRES/ABSN URINE INCON ASSESS: CPT | Performed by: INTERNAL MEDICINE

## 2025-07-03 PROCEDURE — 3075F SYST BP GE 130 - 139MM HG: CPT | Performed by: INTERNAL MEDICINE

## 2025-07-03 PROCEDURE — 1159F MED LIST DOCD IN RCRD: CPT | Performed by: INTERNAL MEDICINE

## 2025-07-03 PROCEDURE — G8400 PT W/DXA NO RESULTS DOC: HCPCS | Performed by: INTERNAL MEDICINE

## 2025-07-03 PROCEDURE — G8427 DOCREV CUR MEDS BY ELIG CLIN: HCPCS | Performed by: INTERNAL MEDICINE

## 2025-07-03 PROCEDURE — 4004F PT TOBACCO SCREEN RCVD TLK: CPT | Performed by: INTERNAL MEDICINE

## 2025-07-03 PROCEDURE — 1123F ACP DISCUSS/DSCN MKR DOCD: CPT | Performed by: INTERNAL MEDICINE

## 2025-07-03 PROCEDURE — 3044F HG A1C LEVEL LT 7.0%: CPT | Performed by: INTERNAL MEDICINE

## 2025-07-03 PROCEDURE — G8417 CALC BMI ABV UP PARAM F/U: HCPCS | Performed by: INTERNAL MEDICINE

## 2025-07-03 PROCEDURE — 3079F DIAST BP 80-89 MM HG: CPT | Performed by: INTERNAL MEDICINE

## 2025-07-03 RX ORDER — ACETAMINOPHEN 500 MG
500 TABLET ORAL EVERY 6 HOURS PRN
Qty: 120 TABLET | Refills: 2 | Status: SHIPPED | OUTPATIENT
Start: 2025-07-03

## 2025-07-03 RX ORDER — CLOPIDOGREL BISULFATE 75 MG/1
75 TABLET ORAL EVERY MORNING
Qty: 90 TABLET | Refills: 1 | Status: SHIPPED | OUTPATIENT
Start: 2025-07-03

## 2025-07-03 RX ORDER — CHLORTHALIDONE 25 MG/1
25 TABLET ORAL DAILY
Qty: 90 TABLET | Refills: 1 | Status: SHIPPED | OUTPATIENT
Start: 2025-07-03

## 2025-07-03 RX ORDER — ASPIRIN 81 MG/1
81 TABLET, CHEWABLE ORAL DAILY
Qty: 90 TABLET | Refills: 1 | Status: SHIPPED | OUTPATIENT
Start: 2025-07-03

## 2025-07-03 RX ORDER — AVOBENZONE, HOMOSALATE, OCTISALATE, OCTOCRYLENE 30; 40; 45; 26 MG/ML; MG/ML; MG/ML; MG/ML
1 CREAM TOPICAL DAILY
Qty: 100 EACH | Refills: 5 | Status: SHIPPED | OUTPATIENT
Start: 2025-07-03

## 2025-07-03 RX ORDER — GLUCOSAMINE HCL/CHONDROITIN SU 500-400 MG
CAPSULE ORAL
Qty: 100 STRIP | Refills: 0 | Status: SHIPPED | OUTPATIENT
Start: 2025-07-03

## 2025-07-03 RX ORDER — SYRING-NEEDL,DISP,INSUL,0.3 ML 30 GX5/16"
1 SYRINGE, EMPTY DISPOSABLE MISCELLANEOUS ONCE
Qty: 100 EACH | Refills: 0 | Status: SHIPPED | OUTPATIENT
Start: 2025-07-03 | End: 2025-07-03

## 2025-07-03 RX ORDER — LEVOTHYROXINE SODIUM 88 UG/1
88 TABLET ORAL DAILY
Qty: 90 TABLET | Refills: 1 | Status: SHIPPED | OUTPATIENT
Start: 2025-07-03

## 2025-07-03 RX ORDER — ATORVASTATIN CALCIUM 40 MG/1
TABLET, FILM COATED ORAL
Qty: 90 TABLET | Refills: 1 | Status: SHIPPED | OUTPATIENT
Start: 2025-07-03

## 2025-07-03 ASSESSMENT — PATIENT HEALTH QUESTIONNAIRE - PHQ9
10. IF YOU CHECKED OFF ANY PROBLEMS, HOW DIFFICULT HAVE THESE PROBLEMS MADE IT FOR YOU TO DO YOUR WORK, TAKE CARE OF THINGS AT HOME, OR GET ALONG WITH OTHER PEOPLE: VERY DIFFICULT
9. THOUGHTS THAT YOU WOULD BE BETTER OFF DEAD, OR OF HURTING YOURSELF: NOT AT ALL
8. MOVING OR SPEAKING SO SLOWLY THAT OTHER PEOPLE COULD HAVE NOTICED. OR THE OPPOSITE, BEING SO FIGETY OR RESTLESS THAT YOU HAVE BEEN MOVING AROUND A LOT MORE THAN USUAL: NOT AT ALL
SUM OF ALL RESPONSES TO PHQ QUESTIONS 1-9: 13
SUM OF ALL RESPONSES TO PHQ QUESTIONS 1-9: 13
6. FEELING BAD ABOUT YOURSELF - OR THAT YOU ARE A FAILURE OR HAVE LET YOURSELF OR YOUR FAMILY DOWN: NEARLY EVERY DAY
2. FEELING DOWN, DEPRESSED OR HOPELESS: NEARLY EVERY DAY
3. TROUBLE FALLING OR STAYING ASLEEP: MORE THAN HALF THE DAYS
1. LITTLE INTEREST OR PLEASURE IN DOING THINGS: SEVERAL DAYS
7. TROUBLE CONCENTRATING ON THINGS, SUCH AS READING THE NEWSPAPER OR WATCHING TELEVISION: NOT AT ALL
4. FEELING TIRED OR HAVING LITTLE ENERGY: MORE THAN HALF THE DAYS
5. POOR APPETITE OR OVEREATING: MORE THAN HALF THE DAYS
SUM OF ALL RESPONSES TO PHQ QUESTIONS 1-9: 13
SUM OF ALL RESPONSES TO PHQ QUESTIONS 1-9: 13

## 2025-07-03 NOTE — PROGRESS NOTES
Name: Mckenna Selby  [id number]  Age: 79 y.o.  YOB: 1945  Sex: female    CHIEF COMPLAINT:    Chief Complaint   Patient presents with    3 Month Follow-Up     Patient was in the hospital a month ago.    Discuss Medications     Wants to discuss meds, hospital took pt off 6 meds she wants to know if she should be back on. Took her off of clonidine, metoprolol, glimepiride, lisinopril, spironolactone, and farxiga.    Blood Pressure Check     Patient states she has had blood pressure problems.        HISTORY OF PRESENT ILLNESS:     This is a pleasant  79 y.o. female  is seen today for management of chronic medical problems and medications refills.  Previous records reviewed .    Patient claims that she was admitted to the hospital more than a month ago from 5/28/2025 to 5/30/2025.  She was admitted because of hypoglycemia and hypotension.  Several of her medications were discontinued.  Patient claims that she is doing better but she thinks that at times her blood pressure may be high.  Does not check her blood pressure.    She does not check her sugars at home.  Amaryl was discontinued during the hospitalization.  She is now on Jardiance instead of Farxiga    She cannot afford Januvia or Ozempic etc. her insurance does not cover these medications.        Denies CP or SOB.  No fever , sore throat or cough or congestion.     Denies any abdominal pain.  Appetite OK.  Bowels moving Ok.  No urinary symptoms.     Still complains of  pain in her knees, right more than the left.  Takes Tylenol for it. and also uses Voltaren gel.  She did see Dr. Santana recently and he gave her an injection in her right knee which helped her somewhat.     Hearing is ok.  Vision Ok with glasses.  Denies  any significant skin lesions.     She is not checking her sugars regularly at home.      Dr. Rendon sees her periodically for her CKD.     She is still smoking @ 1 PPD.   Does not want any medication to quit smoking at this time

## 2025-07-04 DIAGNOSIS — I10 ESSENTIAL HYPERTENSION: ICD-10-CM

## 2025-07-04 DIAGNOSIS — E03.4 HYPOTHYROIDISM DUE TO ACQUIRED ATROPHY OF THYROID: ICD-10-CM

## 2025-07-07 RX ORDER — CHLORTHALIDONE 25 MG/1
25 TABLET ORAL DAILY
Qty: 90 TABLET | Refills: 1 | Status: SHIPPED | OUTPATIENT
Start: 2025-07-07

## 2025-07-07 RX ORDER — LEVOTHYROXINE SODIUM 88 UG/1
88 TABLET ORAL DAILY
Qty: 90 TABLET | Refills: 1 | Status: SHIPPED | OUTPATIENT
Start: 2025-07-07

## 2025-07-23 ENCOUNTER — TELEPHONE (OUTPATIENT)
Dept: INTERNAL MEDICINE CLINIC | Age: 80
End: 2025-07-23

## 2025-07-23 DIAGNOSIS — I10 ESSENTIAL HYPERTENSION: Primary | ICD-10-CM

## 2025-07-23 DIAGNOSIS — G47.00 INSOMNIA, UNSPECIFIED TYPE: ICD-10-CM

## 2025-07-23 RX ORDER — AMLODIPINE BESYLATE 5 MG/1
5 TABLET ORAL DAILY
Qty: 30 TABLET | Refills: 3 | Status: SHIPPED | OUTPATIENT
Start: 2025-07-23

## 2025-07-23 RX ORDER — TRAZODONE HYDROCHLORIDE 50 MG/1
50 TABLET ORAL NIGHTLY PRN
Qty: 30 TABLET | Refills: 5 | Status: SHIPPED | OUTPATIENT
Start: 2025-07-23

## 2025-07-23 NOTE — TELEPHONE ENCOUNTER
[facility]-her BP's are fluctuating from 185/116 to 160/94-patient hasn't been sleeping and having issues with anxiety-she is only sleeping 3-4 hours a night-please advise

## 2025-07-24 NOTE — TELEPHONE ENCOUNTER
Attempted to call Jessica back to inform her of PCP med changes. Reached vm,ROSALBAM instructing to call us back